# Patient Record
Sex: FEMALE | Race: WHITE | Employment: FULL TIME | URBAN - METROPOLITAN AREA
[De-identification: names, ages, dates, MRNs, and addresses within clinical notes are randomized per-mention and may not be internally consistent; named-entity substitution may affect disease eponyms.]

---

## 2017-02-02 ENCOUNTER — TELEPHONE (OUTPATIENT)
Dept: ENDOCRINOLOGY CLINIC | Facility: CLINIC | Age: 52
End: 2017-02-02

## 2017-02-02 NOTE — TELEPHONE ENCOUNTER
Future Appointments  Date Time Provider Minerva Danika   2/15/2017 3:20 PM Charlie White MD [facility]   2/22/2017 4:15 PM Yani German NP [facility]

## 2017-02-04 ENCOUNTER — APPOINTMENT (OUTPATIENT)
Dept: LAB | Facility: HOSPITAL | Age: 52
End: 2017-02-04
Attending: INTERNAL MEDICINE
Payer: COMMERCIAL

## 2017-02-04 DIAGNOSIS — E78.5 HYPERLIPIDEMIA LDL GOAL <100: ICD-10-CM

## 2017-02-04 DIAGNOSIS — E11.40 TYPE 2 DIABETES, UNCONTROLLED, WITH NEUROPATHY (HCC): ICD-10-CM

## 2017-02-04 DIAGNOSIS — E03.9 HYPOTHYROIDISM (ACQUIRED): ICD-10-CM

## 2017-02-04 DIAGNOSIS — I10 HYPERTENSION, ESSENTIAL, BENIGN: ICD-10-CM

## 2017-02-04 DIAGNOSIS — E66.01 MORBID OBESITY WITH BMI OF 40.0-44.9, ADULT (HCC): ICD-10-CM

## 2017-02-04 DIAGNOSIS — E11.65 TYPE 2 DIABETES, UNCONTROLLED, WITH NEUROPATHY (HCC): ICD-10-CM

## 2017-02-04 LAB
ALBUMIN SERPL-MCNC: 3.4 G/DL (ref 3.5–4.8)
ALP LIVER SERPL-CCNC: 98 U/L (ref 39–100)
ALT SERPL-CCNC: 27 U/L (ref 14–54)
AST SERPL-CCNC: 12 U/L (ref 15–41)
BILIRUB SERPL-MCNC: 0.8 MG/DL (ref 0.1–2)
BUN BLD-MCNC: 16 MG/DL (ref 8–20)
CALCIUM BLD-MCNC: 8.4 MG/DL (ref 8.3–10.3)
CHLORIDE: 103 MMOL/L (ref 101–111)
CHOLEST SMN-MCNC: 159 MG/DL (ref ?–200)
CO2: 29 MMOL/L (ref 22–32)
CREAT BLD-MCNC: 0.75 MG/DL (ref 0.55–1.02)
CREAT UR-SCNC: 279 MG/DL
GLUCOSE BLD-MCNC: 181 MG/DL (ref 70–99)
HDLC SERPL-MCNC: 43 MG/DL (ref 45–?)
HDLC SERPL: 3.7 {RATIO} (ref ?–4.44)
LDLC SERPL CALC-MCNC: 85 MG/DL (ref ?–130)
M PROTEIN MFR SERPL ELPH: 7.2 G/DL (ref 6.1–8.3)
MICROALBUMIN UR-MCNC: 3.33 MG/DL
MICROALBUMIN/CREAT 24H UR-RTO: 11.9 UG/MG (ref ?–30)
NONHDLC SERPL-MCNC: 116 MG/DL (ref ?–130)
POTASSIUM SERPL-SCNC: 3.7 MMOL/L (ref 3.6–5.1)
SODIUM SERPL-SCNC: 138 MMOL/L (ref 136–144)
TRIGLYCERIDES: 154 MG/DL (ref ?–150)
TSI SER-ACNC: 4.96 MIU/ML (ref 0.35–5.5)
VLDL: 31 MG/DL (ref 5–40)

## 2017-02-04 PROCEDURE — 82043 UR ALBUMIN QUANTITATIVE: CPT

## 2017-02-04 PROCEDURE — 80061 LIPID PANEL: CPT

## 2017-02-04 PROCEDURE — 84443 ASSAY THYROID STIM HORMONE: CPT

## 2017-02-04 PROCEDURE — 36415 COLL VENOUS BLD VENIPUNCTURE: CPT

## 2017-02-04 PROCEDURE — 80053 COMPREHEN METABOLIC PANEL: CPT

## 2017-02-04 PROCEDURE — 82570 ASSAY OF URINE CREATININE: CPT

## 2017-02-13 NOTE — PROGRESS NOTES
Quick Note:    Please see the 2/8/2017 MyChart patient email note for details regarding these results.         ______

## 2017-02-22 ENCOUNTER — OFFICE VISIT (OUTPATIENT)
Dept: ENDOCRINOLOGY CLINIC | Facility: CLINIC | Age: 52
End: 2017-02-22

## 2017-02-22 VITALS
WEIGHT: 245 LBS | DIASTOLIC BLOOD PRESSURE: 68 MMHG | RESPIRATION RATE: 18 BRPM | BODY MASS INDEX: 39.37 KG/M2 | SYSTOLIC BLOOD PRESSURE: 106 MMHG | TEMPERATURE: 98 F | HEIGHT: 66 IN | HEART RATE: 68 BPM

## 2017-02-22 DIAGNOSIS — E11.65 TYPE 2 DIABETES, UNCONTROLLED, WITH NEUROPATHY (HCC): Primary | ICD-10-CM

## 2017-02-22 DIAGNOSIS — E11.40 TYPE 2 DIABETES, UNCONTROLLED, WITH NEUROPATHY (HCC): Primary | ICD-10-CM

## 2017-02-22 DIAGNOSIS — Z23 NEED FOR STREPTOCOCCUS PNEUMONIAE VACCINATION: ICD-10-CM

## 2017-02-22 PROCEDURE — 90471 IMMUNIZATION ADMIN: CPT | Performed by: NURSE PRACTITIONER

## 2017-02-22 PROCEDURE — 99213 OFFICE O/P EST LOW 20 MIN: CPT | Performed by: NURSE PRACTITIONER

## 2017-02-22 PROCEDURE — 90732 PPSV23 VACC 2 YRS+ SUBQ/IM: CPT | Performed by: NURSE PRACTITIONER

## 2017-02-22 RX ORDER — LANCETS 33 GAUGE
1 EACH MISCELLANEOUS 4 TIMES DAILY
Qty: 1 BOX | Refills: 3 | Status: SHIPPED | OUTPATIENT
Start: 2017-02-22 | End: 2018-02-22

## 2017-02-22 NOTE — PATIENT INSTRUCTIONS
Continue jardiance 25 mg daily drink a lot of water   Have lab in next week (to 3 weeks)  Continue testing sugar before meals and at bedtime   Try protein after awakening to see if helps 2 hour post readings   Continue januvia and amaryl before breakfast a

## 2017-02-23 NOTE — PROGRESS NOTES
CC: Patient presents with:  Diabetes: new pt referred by PCP. pt does have meter.       HISTORY:  Past Medical History   Diagnosis Date   • Essential hypertension, benign 2/3/2014   • Anxiety state, unspecified    • Type II or unspecified type diabetes mackenzie denies     Hypertension:  Blood Pressure: At goal    Medication: lisinopril/hctz  SE none     Hyperlipidemia:  LDL:  85      Medication: none     ROS:   Constitutional: Negative for fever, chills and fatigue. No distress .   Eyes: Negative for visual distu tablet (0.5 mg total) by mouth nightly as needed for Sleep., Disp: 30 tablet, Rfl: 0  •  SITagliptin Phosphate (JANUVIA) 100 MG Oral Tab, TAKE 1 TABLET BY MOUTH DAILY WITH BREAKFAST, Disp: 90 tablet, Rfl: 1  •  Glucose Blood (ONETOUCH VERIO) In Vitro Strip OT Verio Flex is now streaming. Encouraged diabetes ed refresher. Need for streptococcus pneumoniae vaccination today   Educated on: meter use, meds action, se and timing.  Foot care       Orders Placed This Encounter  Pneumococcal 23, Adult (Pneumovax) (

## 2017-03-18 ENCOUNTER — APPOINTMENT (OUTPATIENT)
Dept: LAB | Facility: HOSPITAL | Age: 52
End: 2017-03-18
Attending: INTERNAL MEDICINE
Payer: COMMERCIAL

## 2017-03-18 DIAGNOSIS — E11.40 TYPE 2 DIABETES, UNCONTROLLED, WITH NEUROPATHY (HCC): ICD-10-CM

## 2017-03-18 DIAGNOSIS — E11.65 TYPE 2 DIABETES, UNCONTROLLED, WITH NEUROPATHY (HCC): ICD-10-CM

## 2017-03-18 DIAGNOSIS — I10 HYPERTENSION, ESSENTIAL, BENIGN: ICD-10-CM

## 2017-03-18 LAB
BUN BLD-MCNC: 19 MG/DL (ref 8–20)
CALCIUM BLD-MCNC: 9.1 MG/DL (ref 8.3–10.3)
CHLORIDE: 107 MMOL/L (ref 101–111)
CO2: 26 MMOL/L (ref 22–32)
CREAT BLD-MCNC: 0.72 MG/DL (ref 0.55–1.02)
GLUCOSE BLD-MCNC: 142 MG/DL (ref 70–99)
POTASSIUM SERPL-SCNC: 3.6 MMOL/L (ref 3.6–5.1)
SODIUM SERPL-SCNC: 141 MMOL/L (ref 136–144)

## 2017-03-18 PROCEDURE — 80048 BASIC METABOLIC PNL TOTAL CA: CPT

## 2017-03-18 PROCEDURE — 36415 COLL VENOUS BLD VENIPUNCTURE: CPT

## 2017-03-20 ENCOUNTER — PATIENT MESSAGE (OUTPATIENT)
Dept: ENDOCRINOLOGY CLINIC | Facility: CLINIC | Age: 52
End: 2017-03-20

## 2017-03-20 NOTE — TELEPHONE ENCOUNTER
Misael Lucero RN 3/20/2017 11:11 AM CDT        ----- Message -----   From: Veronica Allison   Sent: 3/20/2017 10:24 AM   To: [facility] Clinical Staff  Subject: Test Results Question     Irving Samuels, I went and did my labs for starting the Jardiance.  I know

## 2017-03-20 NOTE — PROGRESS NOTES
Quick Note:    Please see the 3/19/2017 MyChart patient email note for details regarding these results.       ______

## 2017-03-21 DIAGNOSIS — E11.65 TYPE 2 DIABETES, UNCONTROLLED, WITH NEUROPATHY (HCC): Primary | ICD-10-CM

## 2017-03-21 DIAGNOSIS — E11.40 TYPE 2 DIABETES, UNCONTROLLED, WITH NEUROPATHY (HCC): Primary | ICD-10-CM

## 2017-03-21 RX ORDER — EMPAGLIFLOZIN 25 MG/1
TABLET, FILM COATED ORAL
Qty: 30 TABLET | Refills: 2 | Status: SHIPPED | OUTPATIENT
Start: 2017-03-21 | End: 2017-04-11

## 2017-04-10 DIAGNOSIS — E11.65 TYPE 2 DIABETES, UNCONTROLLED, WITH NEUROPATHY (HCC): ICD-10-CM

## 2017-04-10 DIAGNOSIS — E11.40 TYPE 2 DIABETES, UNCONTROLLED, WITH NEUROPATHY (HCC): ICD-10-CM

## 2017-04-11 RX ORDER — ALPRAZOLAM 0.5 MG/1
0.5 TABLET ORAL NIGHTLY PRN
Qty: 30 TABLET | Refills: 0 | Status: SHIPPED | OUTPATIENT
Start: 2017-04-11 | End: 2017-04-11 | Stop reason: CLARIF

## 2017-04-11 RX ORDER — ALPRAZOLAM 0.5 MG/1
0.5 TABLET ORAL NIGHTLY PRN
Qty: 90 TABLET | Refills: 0 | Status: SHIPPED | OUTPATIENT
Start: 2017-04-11 | End: 2017-07-07

## 2017-04-11 NOTE — TELEPHONE ENCOUNTER
From: Leti Cowart  To:  Brenda Rahman NP  Sent: 4/11/2017 8:33 AM CDT  Subject: Medication Renewal Request    Original authorizing provider: ADAM Albrecht Ma would like a refill of the following medications:  alprazolam Foster Xiomara) 0

## 2017-04-11 NOTE — TELEPHONE ENCOUNTER
From: Thomas Pugh  To:  Mahendra Sommer NP  Sent: 4/10/2017 5:53 PM CDT  Subject: Medication Renewal Request    Original authorizing provider: ADAM Burrell would like a refill of the following medications:  JARDIANCE 25 MG Or

## 2017-04-11 NOTE — TELEPHONE ENCOUNTER
Faxed Rx request for 90-day supply (Alprazolam 0.5mg) to [facility] (Fax #[phone]).

## 2017-05-02 ENCOUNTER — NURSE ONLY (OUTPATIENT)
Dept: ENDOCRINOLOGY CLINIC | Facility: CLINIC | Age: 52
End: 2017-05-02

## 2017-05-02 DIAGNOSIS — E11.65 TYPE 2 DIABETES, UNCONTROLLED, WITH NEUROPATHY (HCC): Primary | ICD-10-CM

## 2017-05-02 DIAGNOSIS — E11.40 TYPE 2 DIABETES, UNCONTROLLED, WITH NEUROPATHY (HCC): Primary | ICD-10-CM

## 2017-05-02 PROCEDURE — G0109 DIAB MANAGE TRN IND/GROUP: HCPCS | Performed by: DIETITIAN, REGISTERED

## 2017-05-02 NOTE — PROGRESS NOTES
Attended Advanced Diabetes Class Part 1: eating out and exercise. Slides reviewed, written material provided. She missed Step 3 class, states she will call tomorrow to make appointment when she has her calendar available.     Debbie Tavares MS, RD, CDE, L

## 2017-06-02 ENCOUNTER — OFFICE VISIT (OUTPATIENT)
Dept: INTERNAL MEDICINE CLINIC | Facility: CLINIC | Age: 52
End: 2017-06-02

## 2017-06-02 VITALS
DIASTOLIC BLOOD PRESSURE: 60 MMHG | RESPIRATION RATE: 16 BRPM | SYSTOLIC BLOOD PRESSURE: 132 MMHG | OXYGEN SATURATION: 99 % | HEART RATE: 80 BPM | TEMPERATURE: 98 F | WEIGHT: 244.75 LBS | BODY MASS INDEX: 40.29 KG/M2 | HEIGHT: 65.5 IN

## 2017-06-02 DIAGNOSIS — L08.9 PUSTULE: ICD-10-CM

## 2017-06-02 DIAGNOSIS — G44.209 TENSION HEADACHE: Primary | ICD-10-CM

## 2017-06-02 PROCEDURE — 99213 OFFICE O/P EST LOW 20 MIN: CPT | Performed by: FAMILY MEDICINE

## 2017-06-02 RX ORDER — CEFADROXIL 500 MG/1
500 CAPSULE ORAL 2 TIMES DAILY
Qty: 14 CAPSULE | Refills: 0 | Status: SHIPPED | OUTPATIENT
Start: 2017-06-02 | End: 2017-06-15 | Stop reason: ALTCHOICE

## 2017-06-02 NOTE — PROGRESS NOTES
HPI:    Patient ID: Charlie Lehman is a 54year old female.     HPI  Seen in [address] [place] [address] for sinusitis 2 weeks ago     Treated w biaxin    Is better   No fevers  No otalgia but was sore    No fevers     No cough  No PND  Does not have a cold    On tylenol prn normal. Pupils are equal, round, and reactive to light. Lymphadenopathy:     She has no cervical adenopathy. Neurological: She has normal reflexes. No cranial nerve deficit.    Skin:   Pustule in R pubic area w 2x3 area fullness  Underneath   Is tender

## 2017-06-12 ENCOUNTER — PATIENT MESSAGE (OUTPATIENT)
Dept: ENDOCRINOLOGY CLINIC | Facility: CLINIC | Age: 52
End: 2017-06-12

## 2017-06-12 DIAGNOSIS — E11.65 TYPE 2 DIABETES, UNCONTROLLED, WITH NEUROPATHY (HCC): Primary | ICD-10-CM

## 2017-06-12 DIAGNOSIS — E11.40 TYPE 2 DIABETES, UNCONTROLLED, WITH NEUROPATHY (HCC): Primary | ICD-10-CM

## 2017-06-12 NOTE — TELEPHONE ENCOUNTER
From: Xavier High  To: Armaan De Los Santos NP  Sent: 6/12/2017 9:42 AM CDT  Subject: Non-Urgent Medical Question    Good Morning Abril. I can not remember when you said to come and see you again.  Also, I just got out of the hospital on Friday and when I

## 2017-06-12 NOTE — TELEPHONE ENCOUNTER
OT Yajaira at  please ask to do labs and schedule f/u visit while she is here, doesn't have to be fasting   Thanks  Abril BRVAO,CDE

## 2017-06-13 ENCOUNTER — APPOINTMENT (OUTPATIENT)
Dept: LAB | Facility: HOSPITAL | Age: 52
End: 2017-06-13
Attending: NURSE PRACTITIONER
Payer: COMMERCIAL

## 2017-06-13 DIAGNOSIS — E11.65 TYPE 2 DIABETES, UNCONTROLLED, WITH NEUROPATHY (HCC): ICD-10-CM

## 2017-06-13 DIAGNOSIS — E11.40 TYPE 2 DIABETES, UNCONTROLLED, WITH NEUROPATHY (HCC): ICD-10-CM

## 2017-06-13 PROCEDURE — 80048 BASIC METABOLIC PNL TOTAL CA: CPT

## 2017-06-13 PROCEDURE — 36415 COLL VENOUS BLD VENIPUNCTURE: CPT

## 2017-06-13 PROCEDURE — 83036 HEMOGLOBIN GLYCOSYLATED A1C: CPT

## 2017-06-14 ENCOUNTER — PATIENT MESSAGE (OUTPATIENT)
Dept: ENDOCRINOLOGY CLINIC | Facility: CLINIC | Age: 52
End: 2017-06-14

## 2017-06-14 NOTE — TELEPHONE ENCOUNTER
From: Remington Lam  To:  Shannan Dunbar NP  Sent: 6/14/2017 8:54 AM CDT  Subject: Non-Urgent Medical Question    Good Morning, so I see my A1C went from 9.9 to 8.6, in the right direction but not good enough, I know what I have to do, I struggle with

## 2017-06-15 ENCOUNTER — OFFICE VISIT (OUTPATIENT)
Dept: INTERNAL MEDICINE CLINIC | Facility: CLINIC | Age: 52
End: 2017-06-15

## 2017-06-15 VITALS
DIASTOLIC BLOOD PRESSURE: 78 MMHG | TEMPERATURE: 99 F | BODY MASS INDEX: 40.98 KG/M2 | SYSTOLIC BLOOD PRESSURE: 112 MMHG | HEART RATE: 85 BPM | OXYGEN SATURATION: 99 % | HEIGHT: 65 IN | WEIGHT: 246 LBS

## 2017-06-15 DIAGNOSIS — I47.1 SVT (SUPRAVENTRICULAR TACHYCARDIA) (HCC): Primary | ICD-10-CM

## 2017-06-15 PROCEDURE — 99213 OFFICE O/P EST LOW 20 MIN: CPT | Performed by: FAMILY MEDICINE

## 2017-06-15 NOTE — PROGRESS NOTES
HPI:    Patient ID: Remington Lam is a 54year old female.     HPI  8d ago was having CP and bilateral arm pain  While leavingwork      Breathing seemed ok but tightness in the neck area   Ambulance called and sent t [facility]    HR was 222   Got IV pt her s/s and DC papaerwork    Sounds like she had SVT   Will refer to Dr Eustaquio Goldberg for now    Noted for work until the 20th     Get old records  No orders of the defined types were placed in this encounter.        Meds This Visit:  No prescription

## 2017-06-26 ENCOUNTER — MYAURORA ACCOUNT LINK (OUTPATIENT)
Dept: OTHER | Age: 52
End: 2017-06-26

## 2017-06-26 ENCOUNTER — PRIOR ORIGINAL RECORDS (OUTPATIENT)
Dept: OTHER | Age: 52
End: 2017-06-26

## 2017-06-26 ENCOUNTER — OFFICE VISIT (OUTPATIENT)
Dept: ENDOCRINOLOGY CLINIC | Facility: CLINIC | Age: 52
End: 2017-06-26

## 2017-06-26 VITALS
RESPIRATION RATE: 18 BRPM | WEIGHT: 244 LBS | DIASTOLIC BLOOD PRESSURE: 66 MMHG | TEMPERATURE: 99 F | HEART RATE: 80 BPM | HEIGHT: 65 IN | BODY MASS INDEX: 40.65 KG/M2 | SYSTOLIC BLOOD PRESSURE: 98 MMHG

## 2017-06-26 DIAGNOSIS — F41.9 ANXIETY: ICD-10-CM

## 2017-06-26 DIAGNOSIS — E11.40 TYPE 2 DIABETES, UNCONTROLLED, WITH NEUROPATHY (HCC): Primary | ICD-10-CM

## 2017-06-26 DIAGNOSIS — E11.65 TYPE 2 DIABETES, UNCONTROLLED, WITH NEUROPATHY (HCC): Primary | ICD-10-CM

## 2017-06-26 PROCEDURE — 99214 OFFICE O/P EST MOD 30 MIN: CPT | Performed by: NURSE PRACTITIONER

## 2017-06-26 RX ORDER — GLIPIZIDE 5 MG/1
5 TABLET, FILM COATED, EXTENDED RELEASE ORAL DAILY
Qty: 90 TABLET | Refills: 1 | Status: SHIPPED | OUTPATIENT
Start: 2017-06-26 | End: 2017-08-04

## 2017-06-26 NOTE — PROGRESS NOTES
CC: Patient presents with:  Diabetes: follow up. pt did bring meter.       HISTORY:  Past Medical History:   Diagnosis Date   • Anxiety state, unspecified    • Essential hypertension, benign 2/3/2014   • Hypothyroidism     Dx at age 38   • Type II or unspec denies     Hypertension:  Blood Pressure: At goal    Medication: lisinopril/hctz  SE none     Hyperlipidemia:  LDL:  85      Medication: none     ROS:   Constitutional: Negative for fever, chills and fatigue. No distress .   Eyes: Negative for visual distu mouth nightly as needed for Sleep., Disp: 90 tablet, Rfl: 0  •  ONETOUCH DELICA LANCETS 59Z Does not apply Misc, 1 lancet by Finger stick route 4 (four) times daily. , Disp: 1 Box, Rfl: 3  •  Levothyroxine Sodium 137 MCG Oral Tab, Take 137 mcg by mouth ever Prescriptions Disp Refills    GlipiZIDE ER 5 MG Oral Tablet 24 Hr 90 tablet 1      Sig: Take 1 tablet (5 mg total) by mouth daily. Sertraline HCl 50 MG Oral Tab 90 tablet 1      Sig: Take 1 tablet (50 mg total) by mouth daily.            Imaging & Cons

## 2017-06-26 NOTE — PATIENT INSTRUCTIONS
Plan stop glimepiride  Continue jardiance and januvia   Start glipizide 5 mg 1 tab every morning before breakfast   continue testing glucose and return in 4-6 weeks with readings     Anxiety start zoloft 50 mg daily call if symptoms worsen

## 2017-07-07 ENCOUNTER — TELEPHONE (OUTPATIENT)
Dept: INTERNAL MEDICINE CLINIC | Facility: CLINIC | Age: 52
End: 2017-07-07

## 2017-07-07 RX ORDER — ALPRAZOLAM 0.5 MG/1
0.5 TABLET ORAL NIGHTLY PRN
Qty: 90 TABLET | Refills: 0 | COMMUNITY
Start: 2017-07-07 | End: 2018-05-13

## 2017-07-13 ENCOUNTER — PATIENT MESSAGE (OUTPATIENT)
Dept: ENDOCRINOLOGY CLINIC | Facility: CLINIC | Age: 52
End: 2017-07-13

## 2017-07-13 DIAGNOSIS — E11.40 TYPE 2 DIABETES, UNCONTROLLED, WITH NEUROPATHY (HCC): Primary | ICD-10-CM

## 2017-07-13 DIAGNOSIS — I10 HYPERTENSION, ESSENTIAL, BENIGN: ICD-10-CM

## 2017-07-13 DIAGNOSIS — E11.65 TYPE 2 DIABETES, UNCONTROLLED, WITH NEUROPATHY (HCC): Primary | ICD-10-CM

## 2017-07-13 DIAGNOSIS — E03.9 HYPOTHYROIDISM (ACQUIRED): ICD-10-CM

## 2017-07-14 RX ORDER — LISINOPRIL AND HYDROCHLOROTHIAZIDE 20; 12.5 MG/1; MG/1
1 TABLET ORAL DAILY
Qty: 90 TABLET | Refills: 0 | Status: SHIPPED | OUTPATIENT
Start: 2017-07-14 | End: 2017-12-05

## 2017-07-14 RX ORDER — LEVOTHYROXINE SODIUM 137 UG/1
137 TABLET ORAL
Qty: 90 TABLET | Refills: 0 | Status: SHIPPED | OUTPATIENT
Start: 2017-07-14 | End: 2017-10-25

## 2017-07-14 NOTE — TELEPHONE ENCOUNTER
Kale Harris, RN 7/13/2017 11:22 AM CDT        ----- Message -----  From: Benito Marking  Sent: 7/13/2017 6:30 AM  To: [facility] Clinical Staff  Subject: Prescription Question     Good Morning Abril, Could you please renew thee prescriptions.  Dr. Vibha Cage wi

## 2017-07-15 ENCOUNTER — HOSPITAL ENCOUNTER (OUTPATIENT)
Dept: MAMMOGRAPHY | Age: 52
Discharge: HOME | End: 2017-07-15
Attending: FAMILY MEDICINE
Payer: COMMERCIAL

## 2017-07-15 DIAGNOSIS — Z12.31 SCREENING MAMMOGRAM, ENCOUNTER FOR: ICD-10-CM

## 2017-07-15 PROCEDURE — 77067 SCR MAMMO BI INCL CAD: CPT | Performed by: FAMILY MEDICINE

## 2017-08-04 DIAGNOSIS — E11.40 TYPE 2 DIABETES, UNCONTROLLED, WITH NEUROPATHY (HCC): ICD-10-CM

## 2017-08-04 DIAGNOSIS — E11.65 TYPE 2 DIABETES, UNCONTROLLED, WITH NEUROPATHY (HCC): ICD-10-CM

## 2017-08-04 RX ORDER — GLIPIZIDE 5 MG/1
5 TABLET, FILM COATED, EXTENDED RELEASE ORAL
Qty: 180 TABLET | Refills: 1 | Status: SHIPPED | OUTPATIENT
Start: 2017-08-04 | End: 2017-10-22

## 2017-08-04 NOTE — TELEPHONE ENCOUNTER
Called pt left a VM to call back to let us know what  Pharmacy she would like us to send the glipizide.

## 2017-10-22 ENCOUNTER — TELEPHONE (OUTPATIENT)
Dept: INTERNAL MEDICINE CLINIC | Facility: CLINIC | Age: 52
End: 2017-10-22

## 2017-10-22 DIAGNOSIS — E11.40 TYPE 2 DIABETES, UNCONTROLLED, WITH NEUROPATHY (HCC): ICD-10-CM

## 2017-10-22 DIAGNOSIS — E11.65 TYPE 2 DIABETES, UNCONTROLLED, WITH NEUROPATHY (HCC): ICD-10-CM

## 2017-10-23 RX ORDER — GLIPIZIDE 5 MG/1
5 TABLET, FILM COATED, EXTENDED RELEASE ORAL
Qty: 60 TABLET | Refills: 1 | Status: SHIPPED | OUTPATIENT
Start: 2017-10-23 | End: 2017-12-18

## 2017-10-23 NOTE — TELEPHONE ENCOUNTER
From: Kong Lewis  Sent: 10/22/2017 6:35 PM CDT  Subject: Medication Renewal Request    Mela Watt would like a refill of the following medications:     GlipiZIDE ER 5 MG Oral Tablet 24 Hr Cheko Schneider NP]    Preferred pharmacy: [place] [id number]

## 2017-10-25 DIAGNOSIS — E03.9 HYPOTHYROIDISM (ACQUIRED): ICD-10-CM

## 2017-10-25 RX ORDER — LEVOTHYROXINE SODIUM 137 UG/1
TABLET ORAL
Qty: 90 TABLET | Refills: 0 | Status: SHIPPED | OUTPATIENT
Start: 2017-10-25 | End: 2017-11-22

## 2017-11-22 DIAGNOSIS — E03.9 HYPOTHYROIDISM (ACQUIRED): ICD-10-CM

## 2017-11-22 DIAGNOSIS — F41.9 ANXIETY: ICD-10-CM

## 2017-11-22 NOTE — TELEPHONE ENCOUNTER
Medication(s) to Refill:   Pending Prescriptions Disp Refills    SERTRALINE HCL 50 MG Oral Tab [Pharmacy Med Name: SERTRALINE HCL 50 MG TABLET] 90 tablet 1     Sig: TAKE 1 TABLET (50 MG TOTAL) BY MOUTH DAILY.            Last Time Medication was Filled:  06/

## 2017-11-26 ENCOUNTER — PATIENT MESSAGE (OUTPATIENT)
Dept: ENDOCRINOLOGY CLINIC | Facility: CLINIC | Age: 52
End: 2017-11-26

## 2017-11-27 ENCOUNTER — TELEPHONE (OUTPATIENT)
Dept: ENDOCRINOLOGY CLINIC | Facility: CLINIC | Age: 52
End: 2017-11-27

## 2017-11-27 ENCOUNTER — PATIENT MESSAGE (OUTPATIENT)
Dept: ENDOCRINOLOGY CLINIC | Facility: CLINIC | Age: 52
End: 2017-11-27

## 2017-11-27 DIAGNOSIS — E11.40 TYPE 2 DIABETES, UNCONTROLLED, WITH NEUROPATHY (HCC): Primary | ICD-10-CM

## 2017-11-27 DIAGNOSIS — E11.65 TYPE 2 DIABETES, UNCONTROLLED, WITH NEUROPATHY (HCC): Primary | ICD-10-CM

## 2017-11-27 DIAGNOSIS — F41.9 ANXIETY: ICD-10-CM

## 2017-11-27 RX ORDER — LEVOTHYROXINE SODIUM 137 UG/1
TABLET ORAL
Qty: 30 TABLET | Refills: 1 | Status: SHIPPED | OUTPATIENT
Start: 2017-11-27 | End: 2018-03-12

## 2017-11-27 NOTE — TELEPHONE ENCOUNTER
From: Derick Tineo  To:  Roxana Branch NP  Sent: 11/27/2017 1:17 PM CST  Subject: Other    Not sure if my other message went through I need to know how much to see you and would you take payments as I don't have insurance yet

## 2017-11-27 NOTE — TELEPHONE ENCOUNTER
From: Lianet Castillo  Sent: 11/27/2017 10:11 AM CST  Subject: Medication Renewal Request    Mandeep Razo.  Alysa would like a refill of the following medications:     Sertraline HCl 50 MG Oral Tab Corey Mcarthur NP]    Preferred pharmacy: [place] [address] [place]

## 2017-11-27 NOTE — TELEPHONE ENCOUNTER
From: Derick Tineo  To: Roxana Branch NP  Sent: 11/26/2017 2:16 PM CST  Subject: Prescription Question    Hi Abril, hope you had a nice thanksgiving.  I just sent in the refill for my Jaradiance, and oh boy, it's almost $500 for 30 days, just wanted

## 2017-11-27 NOTE — TELEPHONE ENCOUNTER
Pt hasn't been in since June please call to schedule asap thanks and if she can labs prior to visit   Jocelyne Harper

## 2017-11-29 NOTE — TELEPHONE ENCOUNTER
Pt does not have insurance now-lost her job-she said she sent SC a adelat msg that she might have to go on a payment plan if she comes in to be seen-not sure what she can do

## 2017-12-05 DIAGNOSIS — I10 HYPERTENSION, ESSENTIAL, BENIGN: ICD-10-CM

## 2017-12-05 RX ORDER — LISINOPRIL AND HYDROCHLOROTHIAZIDE 20; 12.5 MG/1; MG/1
1 TABLET ORAL DAILY
Qty: 90 TABLET | Refills: 0 | Status: SHIPPED | OUTPATIENT
Start: 2017-12-05 | End: 2018-03-17

## 2017-12-18 DIAGNOSIS — E11.65 TYPE 2 DIABETES, UNCONTROLLED, WITH NEUROPATHY (HCC): ICD-10-CM

## 2017-12-18 DIAGNOSIS — E11.40 TYPE 2 DIABETES, UNCONTROLLED, WITH NEUROPATHY (HCC): ICD-10-CM

## 2017-12-18 DIAGNOSIS — F41.9 ANXIETY: ICD-10-CM

## 2017-12-19 RX ORDER — GLIPIZIDE 5 MG/1
5 TABLET, FILM COATED, EXTENDED RELEASE ORAL
Qty: 60 TABLET | Refills: 1 | Status: SHIPPED | OUTPATIENT
Start: 2017-12-19 | End: 2018-01-13

## 2018-01-02 NOTE — TELEPHONE ENCOUNTER
Pt still doesn't have insurance did agree to an appt as long as SC will work something out with her.  Future Appointments  Date Time Provider Minerva Danika   1/13/2018 11:30 AM Marcelle Hernandez NP [facility]     FYI to SC no call back to akilah

## 2018-01-10 ENCOUNTER — TELEPHONE (OUTPATIENT)
Dept: INTERNAL MEDICINE CLINIC | Facility: CLINIC | Age: 53
End: 2018-01-10

## 2018-01-13 ENCOUNTER — OFFICE VISIT (OUTPATIENT)
Dept: ENDOCRINOLOGY CLINIC | Facility: CLINIC | Age: 53
End: 2018-01-13

## 2018-01-13 VITALS
HEART RATE: 72 BPM | RESPIRATION RATE: 18 BRPM | WEIGHT: 245 LBS | HEIGHT: 66 IN | DIASTOLIC BLOOD PRESSURE: 64 MMHG | BODY MASS INDEX: 39.37 KG/M2 | TEMPERATURE: 98 F | SYSTOLIC BLOOD PRESSURE: 100 MMHG

## 2018-01-13 DIAGNOSIS — E11.40 TYPE 2 DIABETES, UNCONTROLLED, WITH NEUROPATHY (HCC): ICD-10-CM

## 2018-01-13 DIAGNOSIS — E11.65 TYPE 2 DIABETES, UNCONTROLLED, WITH NEUROPATHY (HCC): ICD-10-CM

## 2018-01-13 DIAGNOSIS — J06.9 UPPER RESPIRATORY INFECTION WITH COUGH AND CONGESTION: Primary | ICD-10-CM

## 2018-01-13 LAB
CARTRIDGE LOT#: ABNORMAL NUMERIC
HEMOGLOBIN A1C: 9.2 % (ref 4.3–5.6)

## 2018-01-13 PROCEDURE — 83036 HEMOGLOBIN GLYCOSYLATED A1C: CPT | Performed by: NURSE PRACTITIONER

## 2018-01-13 RX ORDER — GLIPIZIDE 5 MG/1
20 TABLET, FILM COATED, EXTENDED RELEASE ORAL DAILY
Qty: 120 TABLET | Refills: 2 | Status: SHIPPED | OUTPATIENT
Start: 2018-01-13 | End: 2018-12-31

## 2018-01-13 RX ORDER — BENZONATATE 100 MG/1
100 CAPSULE ORAL 3 TIMES DAILY PRN
Qty: 30 CAPSULE | Refills: 0 | Status: SHIPPED | OUTPATIENT
Start: 2018-01-13 | End: 2018-06-13

## 2018-01-13 NOTE — PROGRESS NOTES
CC: Patient presents with:  Diabetes: follow up. pt did bring meter.       HISTORY:  Past Medical History:   Diagnosis Date   • Anxiety state, unspecified    • Essential hypertension, benign 2/3/2014   • Hypothyroidism     Dx at age 38   • Type II or unspec 9.9% Uses OT Verio meter   Average Fasting glucose 220-264  mg/dl   Average post meal glucose 175-276mg/dl    Hypoglycemia frequency denies   Hypertension:  Blood Pressure:   At goal    Medication: lisinopril/hctz  SE none     Hyperlipidemia:  LDL:  85 BREAKFAST, Disp: 30 tablet, Rfl: 1  •  Glucose Blood (ONETOUCH VERIO) In Vitro Strip, Test 2 times daily, Disp: 200 strip, Rfl: 1  •  metoprolol Tartrate 25 MG Oral Tab, Take 25 mg by mouth 2 (two) times daily. , Disp: , Rfl:   •  Empagliflozin (Francis Mcfarland) jardiance 25 mg daily, januvia 100 mg daily. increase glipizide 20 mg XR daily. Info on cheaper meters given. Can  stop [facility] if needed. Send readings in 1 week may need insulin or glp1 but not covered with no insurance. F/U 3 months needs labs.    URI/co

## 2018-01-17 DIAGNOSIS — F41.9 ANXIETY: ICD-10-CM

## 2018-01-19 ENCOUNTER — PATIENT MESSAGE (OUTPATIENT)
Dept: ENDOCRINOLOGY CLINIC | Facility: CLINIC | Age: 53
End: 2018-01-19

## 2018-01-19 DIAGNOSIS — J41.1 MUCOPURULENT CHRONIC BRONCHITIS (HCC): Primary | ICD-10-CM

## 2018-01-22 RX ORDER — AZITHROMYCIN 250 MG/1
TABLET, FILM COATED ORAL
Qty: 6 TABLET | Refills: 0 | Status: SHIPPED | OUTPATIENT
Start: 2018-01-22 | End: 2018-06-13

## 2018-01-22 NOTE — TELEPHONE ENCOUNTER
From: Konrad Cummings  To:  Karin Mullen, ADAM  Sent: 1/19/2018 8:06 AM CST  Subject: Visit Follow-up Question    Good Morning Abril,     I wanted to let you know that the medicine you gave me for my cough is working, but I still have the cough, this morn

## 2018-03-12 DIAGNOSIS — E03.9 HYPOTHYROIDISM (ACQUIRED): ICD-10-CM

## 2018-03-12 RX ORDER — LEVOTHYROXINE SODIUM 137 UG/1
TABLET ORAL
Qty: 20 TABLET | Refills: 0 | Status: SHIPPED | OUTPATIENT
Start: 2018-03-12 | End: 2018-04-08

## 2018-03-17 DIAGNOSIS — I10 HYPERTENSION, ESSENTIAL, BENIGN: ICD-10-CM

## 2018-03-19 RX ORDER — LISINOPRIL AND HYDROCHLOROTHIAZIDE 20; 12.5 MG/1; MG/1
1 TABLET ORAL DAILY
Qty: 90 TABLET | Refills: 0 | Status: SHIPPED | OUTPATIENT
Start: 2018-03-19 | End: 2018-06-18

## 2018-04-04 DIAGNOSIS — E11.65 TYPE 2 DIABETES, UNCONTROLLED, WITH NEUROPATHY (HCC): ICD-10-CM

## 2018-04-04 DIAGNOSIS — E11.40 TYPE 2 DIABETES, UNCONTROLLED, WITH NEUROPATHY (HCC): ICD-10-CM

## 2018-04-04 RX ORDER — GLIPIZIDE 10 MG/1
20 TABLET, FILM COATED, EXTENDED RELEASE ORAL DAILY
Qty: 60 TABLET | Refills: 1 | Status: SHIPPED | OUTPATIENT
Start: 2018-04-04 | End: 2018-04-06

## 2018-04-04 RX ORDER — GLIPIZIDE 5 MG/1
20 TABLET, FILM COATED, EXTENDED RELEASE ORAL DAILY
Qty: 120 TABLET | Refills: 2 | OUTPATIENT
Start: 2018-04-04

## 2018-04-04 NOTE — TELEPHONE ENCOUNTER
Medication(s) to Refill:   Pending Prescriptions Disp Refills    GlipiZIDE ER 5 MG Oral Tablet 24 Hr 120 tablet 2     Sig: Take 4 tablets (20 mg total) by mouth daily.              Reason for Medication Refill being sent to Provider / Reason Protocol Failed

## 2018-04-06 RX ORDER — GLIPIZIDE 10 MG/1
20 TABLET, FILM COATED, EXTENDED RELEASE ORAL DAILY
Qty: 60 TABLET | Refills: 1 | Status: SHIPPED | OUTPATIENT
Start: 2018-04-06 | End: 2018-07-16

## 2018-04-08 ENCOUNTER — TELEPHONE (OUTPATIENT)
Dept: INTERNAL MEDICINE CLINIC | Facility: CLINIC | Age: 53
End: 2018-04-08

## 2018-04-08 DIAGNOSIS — E03.9 HYPOTHYROIDISM (ACQUIRED): ICD-10-CM

## 2018-04-08 DIAGNOSIS — E11.65 TYPE 2 DIABETES, UNCONTROLLED, WITH NEUROPATHY (HCC): ICD-10-CM

## 2018-04-08 DIAGNOSIS — E78.5 HYPERLIPIDEMIA LDL GOAL <100: Primary | ICD-10-CM

## 2018-04-08 DIAGNOSIS — E11.40 TYPE 2 DIABETES, UNCONTROLLED, WITH NEUROPATHY (HCC): ICD-10-CM

## 2018-04-09 RX ORDER — LEVOTHYROXINE SODIUM 137 UG/1
TABLET ORAL
Qty: 30 TABLET | Refills: 0 | Status: SHIPPED | OUTPATIENT
Start: 2018-04-09 | End: 2018-05-29

## 2018-04-10 NOTE — TELEPHONE ENCOUNTER
Scheduled pt for Future Appointments  Date Time Provider Minerva Garnica   5/2/2018 4:15 PM Yani German NP [facility]     Pt states that her new ins starts May 1, 2018.  She would like to [facility] on that day to have her urine a

## 2018-04-11 NOTE — TELEPHONE ENCOUNTER
Future Appointments  Date Time Provider Minerva Danika   5/2/2018 4:15 PM Fredis Schmitz NP [facility]

## 2018-04-18 DIAGNOSIS — E11.65 TYPE 2 DIABETES, UNCONTROLLED, WITH NEUROPATHY (HCC): ICD-10-CM

## 2018-04-18 DIAGNOSIS — E11.40 TYPE 2 DIABETES, UNCONTROLLED, WITH NEUROPATHY (HCC): ICD-10-CM

## 2018-04-18 RX ORDER — GLIPIZIDE 5 MG/1
20 TABLET, FILM COATED, EXTENDED RELEASE ORAL DAILY
Qty: 120 TABLET | Refills: 2 | OUTPATIENT
Start: 2018-04-18

## 2018-04-18 RX ORDER — GLIPIZIDE 10 MG/1
20 TABLET, FILM COATED, EXTENDED RELEASE ORAL DAILY
Qty: 60 TABLET | Refills: 1 | Status: SHIPPED | OUTPATIENT
Start: 2018-04-18 | End: 2018-06-13

## 2018-04-18 NOTE — TELEPHONE ENCOUNTER
Medication(s) to Refill:   Pending Prescriptions Disp Refills    GLIPIZIDE ER 5 MG Oral Tablet 24 Hr [Pharmacy Med Name: GLIPIZIDE ER 5 MG TABLET] 120 tablet 2     Sig: TAKE 4 TABLETS (20 MG TOTAL) BY MOUTH DAILY.              Reason for Medication Refill b

## 2018-05-05 ENCOUNTER — LAB ENCOUNTER (OUTPATIENT)
Dept: LAB | Facility: HOSPITAL | Age: 53
End: 2018-05-05
Attending: NURSE PRACTITIONER
Payer: COMMERCIAL

## 2018-05-05 DIAGNOSIS — E03.9 HYPOTHYROIDISM (ACQUIRED): ICD-10-CM

## 2018-05-05 DIAGNOSIS — E78.5 HYPERLIPIDEMIA LDL GOAL <100: ICD-10-CM

## 2018-05-05 DIAGNOSIS — F41.9 ANXIETY: ICD-10-CM

## 2018-05-05 DIAGNOSIS — E11.65 TYPE 2 DIABETES, UNCONTROLLED, WITH NEUROPATHY (HCC): ICD-10-CM

## 2018-05-05 DIAGNOSIS — E11.40 TYPE 2 DIABETES, UNCONTROLLED, WITH NEUROPATHY (HCC): ICD-10-CM

## 2018-05-05 PROCEDURE — 84443 ASSAY THYROID STIM HORMONE: CPT

## 2018-05-05 PROCEDURE — 80053 COMPREHEN METABOLIC PANEL: CPT

## 2018-05-05 PROCEDURE — 82043 UR ALBUMIN QUANTITATIVE: CPT

## 2018-05-05 PROCEDURE — 84439 ASSAY OF FREE THYROXINE: CPT

## 2018-05-05 PROCEDURE — 82570 ASSAY OF URINE CREATININE: CPT

## 2018-05-05 PROCEDURE — 36415 COLL VENOUS BLD VENIPUNCTURE: CPT

## 2018-05-05 PROCEDURE — 80061 LIPID PANEL: CPT

## 2018-05-05 PROCEDURE — 83036 HEMOGLOBIN GLYCOSYLATED A1C: CPT

## 2018-05-11 ENCOUNTER — PATIENT MESSAGE (OUTPATIENT)
Dept: ENDOCRINOLOGY CLINIC | Facility: CLINIC | Age: 53
End: 2018-05-11

## 2018-05-11 DIAGNOSIS — E11.65 TYPE 2 DIABETES, UNCONTROLLED, WITH NEUROPATHY (HCC): ICD-10-CM

## 2018-05-11 DIAGNOSIS — E11.40 TYPE 2 DIABETES, UNCONTROLLED, WITH NEUROPATHY (HCC): ICD-10-CM

## 2018-05-11 NOTE — TELEPHONE ENCOUNTER
From: Ruddy Frank  To: Patrice Razo NP  Sent: 5/11/2018 1:47 PM CDT  Subject: Prescription Question    Irving Butt, Could you p;ease call in or send in prescriptions for Jardiance and Januvia?  I called today to try and make an appointment with your

## 2018-05-13 DIAGNOSIS — E11.65 TYPE 2 DIABETES, UNCONTROLLED, WITH NEUROPATHY (HCC): ICD-10-CM

## 2018-05-13 DIAGNOSIS — E11.40 TYPE 2 DIABETES, UNCONTROLLED, WITH NEUROPATHY (HCC): ICD-10-CM

## 2018-05-14 RX ORDER — ALPRAZOLAM 0.5 MG/1
0.5 TABLET ORAL NIGHTLY PRN
Qty: 90 TABLET | Refills: 0
Start: 2018-05-14 | End: 2018-09-22

## 2018-05-14 NOTE — TELEPHONE ENCOUNTER
From: Renate Clark  Sent: 5/13/2018 11:15 AM CDT  Subject: Medication Renewal Request    Rose Caicedo.  Alysa would like a refill of the following medications:     Empagliflozin (JARDIANCE) 25 MG Oral Tab [Abril Guy NP]     SITagliptin Phosphate (J

## 2018-05-14 NOTE — TELEPHONE ENCOUNTER
Patient returned call and scheduled.     Future Appointments  Date Time Provider Minerva Garnica   6/13/2018 4:30 PM Patrice Razo NP [facility]

## 2018-05-14 NOTE — TELEPHONE ENCOUNTER
From: Mercy Gallagher  Sent: 5/13/2018 11:15 AM CDT  Subject: Medication Renewal Request    Dominga Stein.  Alysa would like a refill of the following medications:     ALPRAZolam Kj Bryant) 0.5 MG Oral Tab    Preferred pharmacy: [facility] [id number] [place] [facility] [place]

## 2018-05-17 ENCOUNTER — PATIENT MESSAGE (OUTPATIENT)
Dept: ENDOCRINOLOGY CLINIC | Facility: CLINIC | Age: 53
End: 2018-05-17

## 2018-05-17 DIAGNOSIS — F41.9 ANXIETY: ICD-10-CM

## 2018-05-17 NOTE — TELEPHONE ENCOUNTER
From: Divina Woodard  Sent: 5/17/2018 7:46 AM CDT  Subject: Medication Renewal Request    Matthew Arceo.  Maribelrd would like a refill of the following medications:     SERTRALINE HCL 50 MG Oral Tab Gracie Marcelino MD]    [facility] [address]

## 2018-05-17 NOTE — TELEPHONE ENCOUNTER
Contacted [facility] @[phone] for pt. ( OT#[id number]) . Coverage was approved for Jardiance 25 mg daily starting 4/17/18 to 5/17/19. Case # [id number]. Contacted [facility] pharmacy to inform them ;it was covered but cost is $200/90 day supply.  Darvin

## 2018-05-22 NOTE — TELEPHONE ENCOUNTER
Please call pharmacy and ask to run through again per below covered, if so let pt know thanks   Thanks   Jocelyne Harper

## 2018-05-25 DIAGNOSIS — F41.9 ANXIETY: ICD-10-CM

## 2018-05-29 DIAGNOSIS — E03.9 HYPOTHYROIDISM (ACQUIRED): ICD-10-CM

## 2018-05-29 RX ORDER — LEVOTHYROXINE SODIUM 137 UG/1
TABLET ORAL
Qty: 30 TABLET | Refills: 5 | Status: SHIPPED | OUTPATIENT
Start: 2018-05-29 | End: 2018-12-13

## 2018-05-29 NOTE — TELEPHONE ENCOUNTER
From: Lisa Aguilar  Sent: 5/25/2018 3:45 PM CDT  Subject: Medication Renewal Request    Chelsy Watt would like a refill of the following medications:     Sertraline HCl 50 MG Oral Tab Ottoniel Mcdowell MD]    Preferred pharmacy: [address] [facility] [address] [place] [address]. [phone], [phone]

## 2018-06-13 ENCOUNTER — OFFICE VISIT (OUTPATIENT)
Dept: ENDOCRINOLOGY CLINIC | Facility: CLINIC | Age: 53
End: 2018-06-13

## 2018-06-13 VITALS
DIASTOLIC BLOOD PRESSURE: 62 MMHG | BODY MASS INDEX: 38.09 KG/M2 | TEMPERATURE: 98 F | WEIGHT: 237 LBS | HEIGHT: 66 IN | SYSTOLIC BLOOD PRESSURE: 98 MMHG | HEART RATE: 72 BPM | RESPIRATION RATE: 18 BRPM

## 2018-06-13 DIAGNOSIS — E11.65 TYPE 2 DIABETES, UNCONTROLLED, WITH NEUROPATHY (HCC): Primary | ICD-10-CM

## 2018-06-13 DIAGNOSIS — E11.40 TYPE 2 DIABETES, UNCONTROLLED, WITH NEUROPATHY (HCC): Primary | ICD-10-CM

## 2018-06-13 PROCEDURE — 99213 OFFICE O/P EST LOW 20 MIN: CPT | Performed by: NURSE PRACTITIONER

## 2018-06-13 RX ORDER — METOPROLOL SUCCINATE 25 MG/1
25 TABLET, EXTENDED RELEASE ORAL
Refills: 3 | COMMUNITY
Start: 2018-03-16 | End: 2019-06-08

## 2018-06-13 NOTE — PROGRESS NOTES
CC: Patient presents with:  Diabetes: follow up. pt did bring meter.       HISTORY:  Past Medical History:   Diagnosis Date   • Anxiety state, unspecified    • Essential hypertension, benign 2/3/2014   • Hypothyroidism     Dx at age 38   • Type II or unspec No distress . Eyes: Negative for visual disturbance. Last eye exam 12/30/17  with [address] No retinopathy   Respiratory: Negative for cough, chest tightness, shortness of breath and wheezing.    Cardiovascular: Negative for chest pain, palpi Tartrate 25 MG Oral Tab, Take 25 mg by mouth 2 (two) times daily. , Disp: , Rfl:   •  Albuterol Sulfate HFA (PROAIR HFA) 108 (90 BASE) MCG/ACT Inhalation Aero Soln, Inhale 2 puffs into the lungs every 4 (four) hours as needed for Wheezing., Disp: 1 Inhaler, needs recheck  Tobacco: n/a   Flu vaccine: up to date   Pneumonia vaccine: up to date   Depression screen: up to date on SSRI    Check glucoses 2-3  times daily - fasting, premeals and/or bedtime.   Call/fax/email glucose logs in 8 weeks    Return in about

## 2018-06-14 DIAGNOSIS — F41.9 ANXIETY: ICD-10-CM

## 2018-06-14 NOTE — TELEPHONE ENCOUNTER
Medication(s) to Refill:   Pending Prescriptions Disp Refills    SERTRALINE HCL 50 MG Oral Tab [Pharmacy Med Name: SERTRALINE HCL 50 MG TABLET] 30 tablet 0     Sig: TAKE 1 TABLET (50 MG TOTAL) BY MOUTH DAILY. DUE FOR OFFICE VISIT.          Non protocol medication   Last Time Medication was Filled: 5/17/18 30 0R   Last Office Visit with PCP: 6/15/2017    When Patient was Due Back to the Office: n/a  (from when PCP last addressed condition)    Last Blood Pressures:  BP Readings from Last 2 Encounters:  06/13/18 : 98/62  01/13/18 : 100/64    Recent Labs:    Lab Results  Component Value Date    (H) 05/05/2018   BUN 20 05/05/2018   CREATSERUM 0.75 05/05/2018    06/13/2017   GFRNAA 93 05/05/2018   GFRAA 107 05/05/2018   CA 9.1 05/05/2018   ALKPHO 118 (H) 05/05/2018   AST 15 05/05/2018   ALT 29 05/05/2018   BILT 0.7 05/05/2018   TP 7.7 05/05/2018   ALB 3.7 05/05/2018   GLOBULT 3.0 02/04/2012   ALBGLOBRAT 1.3 02/04/2012    05/05/2018   K 3.9 05/05/2018    05/05/2018   CO2 25.0 05/05/2018

## 2018-06-18 DIAGNOSIS — I10 HYPERTENSION, ESSENTIAL, BENIGN: ICD-10-CM

## 2018-06-18 RX ORDER — LISINOPRIL AND HYDROCHLOROTHIAZIDE 20; 12.5 MG/1; MG/1
1 TABLET ORAL DAILY
Qty: 90 TABLET | Refills: 0 | Status: SHIPPED | OUTPATIENT
Start: 2018-06-18 | End: 2018-10-11

## 2018-07-16 RX ORDER — GLIPIZIDE 10 MG/1
20 TABLET, FILM COATED, EXTENDED RELEASE ORAL DAILY
Qty: 60 TABLET | Refills: 1 | Status: SHIPPED | OUTPATIENT
Start: 2018-07-16 | End: 2018-10-07

## 2018-07-18 ENCOUNTER — TELEPHONE (OUTPATIENT)
Dept: ENDOCRINOLOGY CLINIC | Facility: CLINIC | Age: 53
End: 2018-07-18

## 2018-07-18 DIAGNOSIS — E11.40 TYPE 2 DIABETES, UNCONTROLLED, WITH NEUROPATHY (HCC): ICD-10-CM

## 2018-07-18 DIAGNOSIS — E11.65 TYPE 2 DIABETES, UNCONTROLLED, WITH NEUROPATHY (HCC): ICD-10-CM

## 2018-07-18 NOTE — TELEPHONE ENCOUNTER
Last Office Visit: 6-13-18 with [facility] for diabetes  Last Rx Filled: 6-13-18 2 pen samples were given  Last Labs: 5-5-18 urine micro albumin/lipid/tsh/t4/cmp/hga1c  Future Appointment: none    Per protocol to provider

## 2018-07-23 RX ORDER — DULAGLUTIDE 0.75 MG/.5ML
0.5 INJECTION, SOLUTION SUBCUTANEOUS WEEKLY
Qty: 4 PEN | Refills: 0 | Status: SHIPPED | OUTPATIENT
Start: 2018-07-23 | End: 2018-08-30

## 2018-07-27 NOTE — TELEPHONE ENCOUNTER
Future Appointments  Date Time Provider Minerva Danika   9/26/2018 4:00 PM Janie Mace MD [facility]

## 2018-08-13 DIAGNOSIS — IMO0002 TYPE 2 DIABETES, UNCONTROLLED, WITH NEUROPATHY: ICD-10-CM

## 2018-08-13 RX ORDER — EMPAGLIFLOZIN 25 MG/1
TABLET, FILM COATED ORAL
Qty: 90 TABLET | Refills: 0 | OUTPATIENT
Start: 2018-08-13

## 2018-08-13 NOTE — TELEPHONE ENCOUNTER
LOV-6/26/17 SC  FOV-9/26/18 AS  LAST RX-5/14/18 90 tabs 0 refills  LAST LABS-5/5/18  PER PROTOCOL-to provider

## 2018-08-18 DIAGNOSIS — IMO0002 TYPE 2 DIABETES, UNCONTROLLED, WITH NEUROPATHY: ICD-10-CM

## 2018-08-20 NOTE — TELEPHONE ENCOUNTER
Last Office Visit: 6-13-18 with [facility] for diabetes  Last Rx Filled: 5-14-18 90 tabs with no refills  Last Labs: 5-5-18 urine micro albumin/lipid/tsh/t4/cmp/hga1c  Future Appointment: 9-26-18    Per protocol to provider

## 2018-08-21 RX ORDER — EMPAGLIFLOZIN 25 MG/1
TABLET, FILM COATED ORAL
Qty: 90 TABLET | Refills: 0 | OUTPATIENT
Start: 2018-08-21

## 2018-08-26 DIAGNOSIS — E11.65 TYPE 2 DIABETES, UNCONTROLLED, WITH NEUROPATHY (HCC): ICD-10-CM

## 2018-08-26 DIAGNOSIS — E11.40 TYPE 2 DIABETES, UNCONTROLLED, WITH NEUROPATHY (HCC): ICD-10-CM

## 2018-08-27 RX ORDER — EMPAGLIFLOZIN 25 MG/1
TABLET, FILM COATED ORAL
Qty: 90 TABLET | Refills: 0 | OUTPATIENT
Start: 2018-08-27

## 2018-08-27 NOTE — TELEPHONE ENCOUNTER
Protocol failed due to last HgBA1C<7.5   Please advise     LOV:6/13/18 SC  FOV:9/26/18  LAST RX: 5/14/18 25 mg take 1 tablet daily 90 tablets 0 refills   LAST LABS:5/5/18microalb,lipid,tsh,cmp,A1C  PER PROTOCOL: to provider

## 2018-08-29 NOTE — TELEPHONE ENCOUNTER
Future Appointments  Date Time Provider [facility] Danika   9/22/2018 9:00 AM Elizabeth Jasso NP [facility] [place]   9/26/2018 4:00 PM Jannis Boast, MD [facility]

## 2018-08-30 DIAGNOSIS — E11.65 TYPE 2 DIABETES, UNCONTROLLED, WITH NEUROPATHY (HCC): ICD-10-CM

## 2018-08-30 DIAGNOSIS — E11.40 TYPE 2 DIABETES, UNCONTROLLED, WITH NEUROPATHY (HCC): ICD-10-CM

## 2018-08-31 RX ORDER — DULAGLUTIDE 0.75 MG/.5ML
0.5 INJECTION, SOLUTION SUBCUTANEOUS WEEKLY
Qty: 4 PEN | Refills: 0 | Status: SHIPPED | OUTPATIENT
Start: 2018-08-31 | End: 2018-09-26

## 2018-08-31 RX ORDER — EMPAGLIFLOZIN 25 MG/1
TABLET, FILM COATED ORAL
Qty: 90 TABLET | Refills: 0 | Status: SHIPPED | OUTPATIENT
Start: 2018-08-31 | End: 2018-11-20

## 2018-08-31 NOTE — TELEPHONE ENCOUNTER
LOV: 06/13/2018 SC  Future Visit: 09/26/2018 AS  Last Labs: 05/05/2018 Micro, Lipid panel, TSH, COMP, Hemoglobin A1C  Last Rx: 07/23/2018    Per protocol sent to provider

## 2018-09-22 ENCOUNTER — OFFICE VISIT (OUTPATIENT)
Dept: INTERNAL MEDICINE CLINIC | Facility: CLINIC | Age: 53
End: 2018-09-22
Payer: COMMERCIAL

## 2018-09-22 VITALS
WEIGHT: 235.75 LBS | HEIGHT: 65.25 IN | HEART RATE: 65 BPM | OXYGEN SATURATION: 98 % | SYSTOLIC BLOOD PRESSURE: 130 MMHG | DIASTOLIC BLOOD PRESSURE: 70 MMHG | BODY MASS INDEX: 38.8 KG/M2 | TEMPERATURE: 99 F | RESPIRATION RATE: 18 BRPM

## 2018-09-22 DIAGNOSIS — Z00.00 ROUTINE GENERAL MEDICAL EXAMINATION AT A HEALTH CARE FACILITY: Primary | ICD-10-CM

## 2018-09-22 DIAGNOSIS — Z00.00 LABORATORY EXAMINATION ORDERED AS PART OF A ROUTINE GENERAL MEDICAL EXAMINATION: ICD-10-CM

## 2018-09-22 DIAGNOSIS — Z12.11 SCREEN FOR COLON CANCER: ICD-10-CM

## 2018-09-22 DIAGNOSIS — Z12.31 SCREENING MAMMOGRAM, ENCOUNTER FOR: ICD-10-CM

## 2018-09-22 DIAGNOSIS — Z12.11 ENCOUNTER FOR SCREENING FECAL OCCULT BLOOD TESTING: ICD-10-CM

## 2018-09-22 LAB
ALBUMIN SERPL-MCNC: 3.9 G/DL (ref 3.5–4.8)
ALBUMIN/GLOB SERPL: 1 {RATIO} (ref 1–2)
ALP LIVER SERPL-CCNC: 108 U/L (ref 39–100)
ALT SERPL-CCNC: 37 U/L (ref 14–54)
ANION GAP SERPL CALC-SCNC: 8 MMOL/L (ref 0–18)
AST SERPL-CCNC: 18 U/L (ref 15–41)
BASOPHILS # BLD AUTO: 0.06 X10(3) UL (ref 0–0.1)
BASOPHILS NFR BLD AUTO: 0.6 %
BILIRUB SERPL-MCNC: 0.7 MG/DL (ref 0.1–2)
BUN BLD-MCNC: 18 MG/DL (ref 8–20)
BUN/CREAT SERPL: 22.5 (ref 10–20)
CALCIUM BLD-MCNC: 8.9 MG/DL (ref 8.3–10.3)
CHLORIDE SERPL-SCNC: 106 MMOL/L (ref 101–111)
CHOLEST SMN-MCNC: 156 MG/DL (ref ?–200)
CO2 SERPL-SCNC: 26 MMOL/L (ref 22–32)
CREAT BLD-MCNC: 0.8 MG/DL (ref 0.55–1.02)
EOSINOPHIL # BLD AUTO: 0.18 X10(3) UL (ref 0–0.3)
EOSINOPHIL NFR BLD AUTO: 1.8 %
ERYTHROCYTE [DISTWIDTH] IN BLOOD BY AUTOMATED COUNT: 12 % (ref 11.5–16)
EST. AVERAGE GLUCOSE BLD GHB EST-MCNC: 192 MG/DL (ref 68–126)
GLOBULIN PLAS-MCNC: 4.1 G/DL (ref 2.5–4)
GLUCOSE BLD-MCNC: 134 MG/DL (ref 70–99)
HBA1C MFR BLD HPLC: 8.3 % (ref ?–5.7)
HCT VFR BLD AUTO: 49.2 % (ref 34–50)
HDLC SERPL-MCNC: 38 MG/DL (ref 40–59)
HGB BLD-MCNC: 16.1 G/DL (ref 12–16)
IMMATURE GRANULOCYTE COUNT: 0.02 X10(3) UL (ref 0–1)
IMMATURE GRANULOCYTE RATIO %: 0.2 %
LDLC SERPL CALC-MCNC: 89 MG/DL (ref ?–100)
LYMPHOCYTES # BLD AUTO: 2.44 X10(3) UL (ref 0.9–4)
LYMPHOCYTES NFR BLD AUTO: 24.9 %
M PROTEIN MFR SERPL ELPH: 8 G/DL (ref 6.1–8.3)
MCH RBC QN AUTO: 29.4 PG (ref 27–33.2)
MCHC RBC AUTO-ENTMCNC: 32.7 G/DL (ref 31–37)
MCV RBC AUTO: 89.9 FL (ref 81–100)
MONOCYTES # BLD AUTO: 0.34 X10(3) UL (ref 0.1–1)
MONOCYTES NFR BLD AUTO: 3.5 %
NEUTROPHIL ABS PRELIM: 6.76 X10 (3) UL (ref 1.3–6.7)
NEUTROPHILS # BLD AUTO: 6.76 X10(3) UL (ref 1.3–6.7)
NEUTROPHILS NFR BLD AUTO: 69 %
NONHDLC SERPL-MCNC: 118 MG/DL (ref ?–130)
OSMOLALITY SERPL CALC.SUM OF ELEC: 294 MOSM/KG (ref 275–295)
PLATELET # BLD AUTO: 274 10(3)UL (ref 150–450)
POTASSIUM SERPL-SCNC: 3.9 MMOL/L (ref 3.6–5.1)
RBC # BLD AUTO: 5.47 X10(6)UL (ref 3.8–5.1)
RED CELL DISTRIBUTION WIDTH-SD: 39.4 FL (ref 35.1–46.3)
SODIUM SERPL-SCNC: 140 MMOL/L (ref 136–144)
TRIGL SERPL-MCNC: 144 MG/DL (ref 30–149)
VIT D+METAB SERPL-MCNC: 18.4 NG/ML (ref 30–100)
VLDLC SERPL CALC-MCNC: 29 MG/DL (ref 0–30)
WBC # BLD AUTO: 9.8 X10(3) UL (ref 4–13)

## 2018-09-22 PROCEDURE — 85025 COMPLETE CBC W/AUTO DIFF WBC: CPT | Performed by: FAMILY MEDICINE

## 2018-09-22 PROCEDURE — 83036 HEMOGLOBIN GLYCOSYLATED A1C: CPT | Performed by: FAMILY MEDICINE

## 2018-09-22 PROCEDURE — 82306 VITAMIN D 25 HYDROXY: CPT | Performed by: FAMILY MEDICINE

## 2018-09-22 PROCEDURE — 80061 LIPID PANEL: CPT | Performed by: FAMILY MEDICINE

## 2018-09-22 PROCEDURE — 99396 PREV VISIT EST AGE 40-64: CPT | Performed by: FAMILY MEDICINE

## 2018-09-22 PROCEDURE — 80053 COMPREHEN METABOLIC PANEL: CPT | Performed by: FAMILY MEDICINE

## 2018-09-22 RX ORDER — ALPRAZOLAM 0.5 MG/1
0.5 TABLET ORAL NIGHTLY PRN
Qty: 90 TABLET | Refills: 0 | Status: SHIPPED | OUTPATIENT
Start: 2018-09-22 | End: 2019-05-21

## 2018-09-22 NOTE — PROGRESS NOTES
HPI:   Charlie Lehman is a 50year old female who presents for a complete physical exam. Symptoms: denies discharge, itching, burning or dysuria, flow is 7-8 days, menses irregular last one was around June. Patient complains: none.  Pt will be seeing the Date     05/05/2018    LDL 85 02/04/2017     12/12/2015     Lab Results   Component Value Date    AST 15 05/05/2018    AST 12 (L) 02/04/2017    AST 16 12/12/2015     Lab Results   Component Value Date    ALT 29 05/05/2018    ALT 27 02/04/2017 date: OTHER SURGICAL HISTORY      Comment:  [place] teeth extraction   Family History   Problem Relation Age of Onset   • Diabetes Mother         Type 2 DM   • Heart Disorder Mother         CHF and atrial fib   • Diabetes Father    • Diabetes Maternal Driver BS's,no masses, HSM or tenderness  :deferred  MUSCULOSKELETAL: back is not tender,FROM of the back  EXTREMITIES: no cyanosis, clubbing or edema  NEURO: Oriented times three,cranial nerves are intact,motor and sensory are grossly intact    ASSESSMENT AND

## 2018-09-24 ENCOUNTER — TELEPHONE (OUTPATIENT)
Dept: INTERNAL MEDICINE CLINIC | Facility: CLINIC | Age: 53
End: 2018-09-24

## 2018-09-24 DIAGNOSIS — R74.8 ELEVATED ALKALINE PHOSPHATASE LEVEL: Primary | ICD-10-CM

## 2018-09-24 DIAGNOSIS — E78.5 HYPERLIPIDEMIA LDL GOAL <100: ICD-10-CM

## 2018-09-24 DIAGNOSIS — E55.9 VITAMIN D DEFICIENCY: ICD-10-CM

## 2018-09-24 DIAGNOSIS — E11.40 TYPE 2 DIABETES, UNCONTROLLED, WITH NEUROPATHY (HCC): ICD-10-CM

## 2018-09-24 DIAGNOSIS — E11.65 TYPE 2 DIABETES, UNCONTROLLED, WITH NEUROPATHY (HCC): ICD-10-CM

## 2018-09-24 RX ORDER — ERGOCALCIFEROL 1.25 MG/1
50000 CAPSULE ORAL WEEKLY
Qty: 12 CAPSULE | Refills: 1 | Status: SHIPPED | OUTPATIENT
Start: 2018-09-24 | End: 2018-10-24

## 2018-09-24 RX ORDER — DULAGLUTIDE 0.75 MG/.5ML
0.5 INJECTION, SOLUTION SUBCUTANEOUS WEEKLY
Qty: 2 ML | Refills: 1 | OUTPATIENT
Start: 2018-09-24

## 2018-09-24 NOTE — TELEPHONE ENCOUNTER
Pt needs vit D 97748 IU once a week for 6 monthsw , after 2000 IU daily!!!. Recheck vit D in 6 months. Hdl up a little but still low. Pt to work on exercising more. Alk phos improving, will continue to monitor the level. Cbc ok.  Recheck CMP,lipids in 6 mon

## 2018-09-24 NOTE — TELEPHONE ENCOUNTER
Last Office Visit: 6-13-18 with [facility] for diabetes  Last Rx Filled: 8-31-18 4 pen with no refills  Last Labs: 9-22-18 cbc/vitamin D/hga1c/cmp/lipid  Future Appointment: 9-26-18    Per protocol to provider

## 2018-09-25 NOTE — TELEPHONE ENCOUNTER
Future Appointments   Date Time Provider Minerva Danika   9/26/2018  4:00 PM Maria D Palomares MD [facility]

## 2018-09-26 ENCOUNTER — OFFICE VISIT (OUTPATIENT)
Dept: ENDOCRINOLOGY CLINIC | Facility: CLINIC | Age: 53
End: 2018-09-26
Payer: COMMERCIAL

## 2018-09-26 VITALS
BODY MASS INDEX: 39.82 KG/M2 | SYSTOLIC BLOOD PRESSURE: 98 MMHG | DIASTOLIC BLOOD PRESSURE: 56 MMHG | HEIGHT: 65 IN | HEART RATE: 72 BPM | WEIGHT: 239 LBS | TEMPERATURE: 98 F

## 2018-09-26 DIAGNOSIS — E78.5 HYPERLIPIDEMIA LDL GOAL <100: Primary | ICD-10-CM

## 2018-09-26 DIAGNOSIS — E11.40 TYPE 2 DIABETES, UNCONTROLLED, WITH NEUROPATHY (HCC): ICD-10-CM

## 2018-09-26 DIAGNOSIS — E11.65 TYPE 2 DIABETES, UNCONTROLLED, WITH NEUROPATHY (HCC): ICD-10-CM

## 2018-09-26 DIAGNOSIS — E03.9 HYPOTHYROIDISM (ACQUIRED): ICD-10-CM

## 2018-09-26 DIAGNOSIS — I10 HYPERTENSION, ESSENTIAL, BENIGN: ICD-10-CM

## 2018-09-26 PROCEDURE — 99214 OFFICE O/P EST MOD 30 MIN: CPT | Performed by: INTERNAL MEDICINE

## 2018-09-26 NOTE — PROGRESS NOTES
Patient presents with:  Diabetes: LG. Room 8. Has her meter with her for readings      HPI:  Here for f/u of dm2, htn, high chol. Pt has had improvement in  A1, not yet to goal, but much mproved with addition of truliciity, no tolerability issues or se's. mouth once daily.  Disp:  Rfl: 3   LEVOTHYROXINE SODIUM 137 MCG Oral Tab TAKE 1 TABLET BY MOUTH EVERY MORNING BEFORE BREAKFAST Disp: 30 tablet Rfl: 5   Glucose Blood (ONETOUCH VERIO) In Vitro Strip Test 2 times daily Disp: 200 strip Rfl: 1   Albuterol Sulfa were answered and the patient understands the plan.

## 2018-10-08 RX ORDER — GLIPIZIDE 10 MG/1
TABLET, FILM COATED, EXTENDED RELEASE ORAL
Qty: 60 TABLET | Refills: 1 | Status: SHIPPED | OUTPATIENT
Start: 2018-10-08 | End: 2018-11-20

## 2018-10-08 NOTE — TELEPHONE ENCOUNTER
LOV-9/26/18 AS  FOV-1/9/19 AS  LAST RX-7/16/18 60 tabs 1 refill  LAST LABS-9/22/18 a1c- 8.3  PER PROTOCOL-to provider

## 2018-10-11 ENCOUNTER — TELEPHONE (OUTPATIENT)
Dept: ENDOCRINOLOGY CLINIC | Facility: CLINIC | Age: 53
End: 2018-10-11

## 2018-10-11 DIAGNOSIS — I10 HYPERTENSION, ESSENTIAL, BENIGN: ICD-10-CM

## 2018-10-11 NOTE — TELEPHONE ENCOUNTER
FBS's still not to-goal. A1C improved to 8.3% in Sept. Her next appt with you is Ginger Please advise.     10/11 6:01am 155   10/10 6:07am 137   10/9 5:50am 189   10/8 6:01am 182   10/7 8:12 139   10/6 7:16 am 167   10/5 5:52am 150

## 2018-10-12 RX ORDER — LISINOPRIL AND HYDROCHLOROTHIAZIDE 20; 12.5 MG/1; MG/1
1 TABLET ORAL DAILY
Qty: 90 TABLET | Refills: 0 | Status: SHIPPED | OUTPATIENT
Start: 2018-10-12 | End: 2019-01-06

## 2018-11-05 ENCOUNTER — TELEPHONE (OUTPATIENT)
Dept: INTERNAL MEDICINE CLINIC | Facility: CLINIC | Age: 53
End: 2018-11-05

## 2018-11-05 DIAGNOSIS — F41.9 ANXIETY: ICD-10-CM

## 2018-11-17 ENCOUNTER — LAB ENCOUNTER (OUTPATIENT)
Dept: LAB | Facility: HOSPITAL | Age: 53
End: 2018-11-17
Attending: FAMILY MEDICINE
Payer: COMMERCIAL

## 2018-11-17 DIAGNOSIS — E11.40 TYPE 2 DIABETES, UNCONTROLLED, WITH NEUROPATHY (HCC): ICD-10-CM

## 2018-11-17 DIAGNOSIS — E11.65 TYPE 2 DIABETES, UNCONTROLLED, WITH NEUROPATHY (HCC): ICD-10-CM

## 2018-11-17 PROCEDURE — 82570 ASSAY OF URINE CREATININE: CPT

## 2018-11-17 PROCEDURE — 82043 UR ALBUMIN QUANTITATIVE: CPT

## 2018-11-17 PROCEDURE — 80061 LIPID PANEL: CPT

## 2018-11-17 PROCEDURE — 36415 COLL VENOUS BLD VENIPUNCTURE: CPT

## 2018-11-17 PROCEDURE — 83036 HEMOGLOBIN GLYCOSYLATED A1C: CPT

## 2018-11-17 PROCEDURE — 80053 COMPREHEN METABOLIC PANEL: CPT

## 2018-11-20 ENCOUNTER — OFFICE VISIT (OUTPATIENT)
Dept: ENDOCRINOLOGY CLINIC | Facility: CLINIC | Age: 53
End: 2018-11-20
Payer: COMMERCIAL

## 2018-11-20 VITALS
BODY MASS INDEX: 39.67 KG/M2 | WEIGHT: 241 LBS | TEMPERATURE: 98 F | RESPIRATION RATE: 20 BRPM | HEIGHT: 65.5 IN | DIASTOLIC BLOOD PRESSURE: 60 MMHG | HEART RATE: 80 BPM | SYSTOLIC BLOOD PRESSURE: 110 MMHG

## 2018-11-20 DIAGNOSIS — E78.5 HYPERLIPIDEMIA LDL GOAL <100: Primary | ICD-10-CM

## 2018-11-20 DIAGNOSIS — E11.40 TYPE 2 DIABETES, UNCONTROLLED, WITH NEUROPATHY (HCC): ICD-10-CM

## 2018-11-20 DIAGNOSIS — I10 HYPERTENSION, ESSENTIAL, BENIGN: ICD-10-CM

## 2018-11-20 DIAGNOSIS — E11.65 TYPE 2 DIABETES, UNCONTROLLED, WITH NEUROPATHY (HCC): ICD-10-CM

## 2018-11-20 DIAGNOSIS — E66.01 MORBID OBESITY WITH BMI OF 40.0-44.9, ADULT (HCC): ICD-10-CM

## 2018-11-20 PROCEDURE — 99214 OFFICE O/P EST MOD 30 MIN: CPT | Performed by: NURSE PRACTITIONER

## 2018-11-20 RX ORDER — GLIPIZIDE 10 MG/1
20 TABLET, FILM COATED, EXTENDED RELEASE ORAL
Qty: 180 TABLET | Refills: 1 | Status: SHIPPED | OUTPATIENT
Start: 2018-11-20 | End: 2019-02-06

## 2018-11-20 RX ORDER — LANCETS 33 GAUGE
EACH MISCELLANEOUS
Qty: 1 BOX | Refills: 11 | Status: SHIPPED | OUTPATIENT
Start: 2018-11-20 | End: 2021-08-25

## 2018-11-20 RX ORDER — ATORVASTATIN CALCIUM 20 MG/1
20 TABLET, FILM COATED ORAL DAILY
Qty: 30 TABLET | Refills: 3 | Status: SHIPPED | OUTPATIENT
Start: 2018-11-20 | End: 2019-02-26

## 2018-11-20 RX ORDER — BLOOD SUGAR DIAGNOSTIC
STRIP MISCELLANEOUS
Qty: 100 STRIP | Refills: 11 | Status: SHIPPED | OUTPATIENT
Start: 2018-11-20 | End: 2019-03-12

## 2018-11-20 NOTE — PATIENT INSTRUCTIONS
We are her to support you with Diabetes but please remember that you still need your primary care doctor for your routine health maintenance. Your A1C: 7.7%  Good job this is down from 8.3%  Your goal next visit is to be down in lower or below 7 % range. sugars and see how the medication works at different times of day.    Recommended blood sugar targets:  Before breakfast:   (preferably < 110)  2 hours After meals: less than 180 (preferably less than 150)   Call for persistent blood sugars < 75 or >

## 2018-11-20 NOTE — PROGRESS NOTES
Patient presents with:  Diabetes: follow up. pt did bring readings. HPI:   Anna Bonilla is a 48year old female presenting with type 2  DM medication management.  In the past 3m, DM control has improved slightly: 7.7%  (last A1C: 8.3% )   At  visit in DM ce (mg/dL)   Date Value   11/17/2018 100 (H)     AST (U/L)   Date Value   11/17/2018 13 (L)   02/08/2014 20   02/04/2012 12     ALT (U/L)   Date Value   02/08/2014 42   02/04/2012 18     Alt (U/L)   Date Value   11/17/2018 27      Malb/Cre Calc   Date Value R brother with Type 2 DM   • Thyroid Disorder Neg    • Thyroid disease Neg        Current Outpatient Medications:  Empagliflozin (JARDIANCE) 25 MG Oral Tab Take 1 tablet by mouth once daily.  Disp: 90 tablet Rfl: 0   Dulaglutide (TRULICITY) 1.5 YF/5.8ZN Subcu for polydipsia, polyphagia and polyuria. Genitourinary: Negative for dysuria. Musculoskeletal: Negative for joint pain. Skin: Negative for rash. Neurological: Positive for numbness.    Hematological:        No hypoglycemia   Psychiatric/Behavioral: how eliminating this would have + impact on BG levels, weight and DM control   Discussed progressive nature of type 2 DM and how common it is to need more meds as time goes on       Educated patient on pathophysiology of type 2 DM and insulin resistance  P 3      DM Quality Indicators:  A1C as reported above  Retinopathy Screen: Due 12/2018 no hx DR   Nephropathy Screen: 11/2018 NEG   Continue ACEi  Depression Screen:  PHQ-2 1/13/2018   Feet exam: Reviewed feet precautions 1/13/2018   BP: as above  Lipids: L

## 2018-11-20 NOTE — ASSESSMENT & PLAN NOTE
A1C :   Lab Results   Component Value Date     (H) 11/17/2018    A1C 7.7 (H) 11/17/2018   down from 8.3%     Weight: 241 lb   Diabetes control is improved but NOT at goal.  Had lengthy discussion regarding poor glycemic control.  Patient was reminded

## 2018-11-24 ENCOUNTER — HOSPITAL ENCOUNTER (OUTPATIENT)
Dept: MAMMOGRAPHY | Age: 53
Discharge: HOME | End: 2018-11-24
Attending: FAMILY MEDICINE
Payer: COMMERCIAL

## 2018-11-24 DIAGNOSIS — Z12.31 SCREENING MAMMOGRAM, ENCOUNTER FOR: ICD-10-CM

## 2018-11-24 PROCEDURE — 77063 BREAST TOMOSYNTHESIS BI: CPT | Performed by: FAMILY MEDICINE

## 2018-11-24 PROCEDURE — 77067 SCR MAMMO BI INCL CAD: CPT | Performed by: FAMILY MEDICINE

## 2018-12-11 RX ORDER — ERGOCALCIFEROL 1.25 MG/1
50000 CAPSULE ORAL WEEKLY
Qty: 12 CAPSULE | Refills: 1 | OUTPATIENT
Start: 2018-12-11 | End: 2019-01-10

## 2018-12-11 NOTE — TELEPHONE ENCOUNTER
Ergocalciferol  Last OV relevant to medication: 9/24/18 (telephone)  Last refill date: 9/24/18 #/refills: 1   End date: 10/24/18  When pt was asked to return for OV: N/a  Upcoming appt/reason: none  Recent labs: 11/17/18

## 2018-12-13 DIAGNOSIS — E03.9 HYPOTHYROIDISM (ACQUIRED): ICD-10-CM

## 2018-12-13 RX ORDER — LEVOTHYROXINE SODIUM 137 UG/1
TABLET ORAL
Qty: 30 TABLET | Refills: 5 | Status: SHIPPED | OUTPATIENT
Start: 2018-12-13 | End: 2019-06-08

## 2018-12-13 NOTE — TELEPHONE ENCOUNTER
Approved per protocol  Levothyroxine   Last OV relevant to medication: 9/22/18  Last refill date: 5/29/18  #/refills: 5  When pt was asked to return for OV:  n/a  Upcoming appt/reason: none

## 2018-12-29 DIAGNOSIS — E11.40 TYPE 2 DIABETES, UNCONTROLLED, WITH NEUROPATHY (HCC): ICD-10-CM

## 2018-12-29 DIAGNOSIS — E11.65 TYPE 2 DIABETES, UNCONTROLLED, WITH NEUROPATHY (HCC): ICD-10-CM

## 2018-12-31 RX ORDER — DULAGLUTIDE 1.5 MG/.5ML
INJECTION, SOLUTION SUBCUTANEOUS
Qty: 2 ML | Refills: 2 | Status: SHIPPED | OUTPATIENT
Start: 2018-12-31 | End: 2019-03-05

## 2018-12-31 RX ORDER — GLIPIZIDE 5 MG/1
20 TABLET, FILM COATED, EXTENDED RELEASE ORAL DAILY
Qty: 120 TABLET | Refills: 0 | Status: SHIPPED | OUTPATIENT
Start: 2018-12-31 | End: 2019-02-01

## 2018-12-31 NOTE — TELEPHONE ENCOUNTER
LOV-11/20/18 cab  FOV-none  LAST RX-11/20/18 4 pen 2  refill  LAST LABS-11/17/18 a1c- 7.7  PER PROTOCOL-to provider

## 2018-12-31 NOTE — TELEPHONE ENCOUNTER
LOV-11/20/18 CAB  FOV-none  LAST RX-11/2018 180 tabs 1 refill  LAST LABS-11/17/18 a1c- 7.7  PER PROTOCOL-to provider

## 2019-01-04 ENCOUNTER — TELEPHONE (OUTPATIENT)
Dept: INTERNAL MEDICINE CLINIC | Facility: CLINIC | Age: 54
End: 2019-01-04

## 2019-01-06 DIAGNOSIS — I10 HYPERTENSION, ESSENTIAL, BENIGN: ICD-10-CM

## 2019-01-07 RX ORDER — LISINOPRIL AND HYDROCHLOROTHIAZIDE 20; 12.5 MG/1; MG/1
TABLET ORAL
Qty: 90 TABLET | Refills: 0 | Status: SHIPPED | OUTPATIENT
Start: 2019-01-07 | End: 2019-04-13

## 2019-01-29 DIAGNOSIS — E11.65 TYPE 2 DIABETES, UNCONTROLLED, WITH NEUROPATHY (HCC): ICD-10-CM

## 2019-01-29 DIAGNOSIS — E11.40 TYPE 2 DIABETES, UNCONTROLLED, WITH NEUROPATHY (HCC): ICD-10-CM

## 2019-01-29 RX ORDER — GLIPIZIDE 5 MG/1
20 TABLET, FILM COATED, EXTENDED RELEASE ORAL DAILY
Qty: 360 TABLET | Refills: 0 | Status: CANCELLED | OUTPATIENT
Start: 2019-01-29

## 2019-01-29 NOTE — TELEPHONE ENCOUNTER
LOV-11/20/18 CAB  FOV-none  LAST RX-12/31/18 120 tabs 0 refill  LAST LABS-11/17/18 a1c- 7.7  PER PROTOCOL-to provider

## 2019-01-31 DIAGNOSIS — E11.65 TYPE 2 DIABETES, UNCONTROLLED, WITH NEUROPATHY (HCC): ICD-10-CM

## 2019-01-31 DIAGNOSIS — E11.40 TYPE 2 DIABETES, UNCONTROLLED, WITH NEUROPATHY (HCC): ICD-10-CM

## 2019-01-31 NOTE — TELEPHONE ENCOUNTER
LOV-11/20/18 CAB  FOV-none  LAST RX-12/31/18 120 tabs 0 refills  LAST LABS-11/17/18 a1c-7.7  PER PROTOCOL-to provider

## 2019-02-01 RX ORDER — GLIPIZIDE 5 MG/1
20 TABLET, FILM COATED, EXTENDED RELEASE ORAL DAILY
Qty: 120 TABLET | Refills: 0 | OUTPATIENT
Start: 2019-02-01

## 2019-02-01 NOTE — TELEPHONE ENCOUNTER
2nd denial for refill rquest.   At last visit, I chnged the Glipizide XL from 5m tabs twice daily to 10mg : 2 tabs twice daily   Please call pharmacy and advise she ONLY needs Glipizide XL 10mg rx : 2 tabs twice daily as active

## 2019-02-04 ENCOUNTER — TELEPHONE (OUTPATIENT)
Dept: ENDOCRINOLOGY CLINIC | Facility: CLINIC | Age: 54
End: 2019-02-04

## 2019-02-04 ENCOUNTER — PATIENT MESSAGE (OUTPATIENT)
Dept: ENDOCRINOLOGY CLINIC | Facility: CLINIC | Age: 54
End: 2019-02-04

## 2019-02-04 DIAGNOSIS — E11.40 TYPE 2 DIABETES, UNCONTROLLED, WITH NEUROPATHY (HCC): Primary | ICD-10-CM

## 2019-02-04 DIAGNOSIS — E11.65 TYPE 2 DIABETES, UNCONTROLLED, WITH NEUROPATHY (HCC): Primary | ICD-10-CM

## 2019-02-04 NOTE — TELEPHONE ENCOUNTER
Patient requested sample of Trulicity 1.5 via [employer]. Per Schuyler Sample, ABDOULAYEN,, OK to give samples. Contacted pt that she can pick them up at office today before 3:30 or anytime tomorrow (2/5/19).

## 2019-02-05 NOTE — TELEPHONE ENCOUNTER
Called all 3 numbers on file. Home and cell- no answer and was unable to lm.  Work phone was a wrong number     Sending letter via [employer] [facility]

## 2019-02-06 DIAGNOSIS — E11.65 TYPE 2 DIABETES, UNCONTROLLED, WITH NEUROPATHY (HCC): Primary | ICD-10-CM

## 2019-02-06 DIAGNOSIS — E11.40 TYPE 2 DIABETES, UNCONTROLLED, WITH NEUROPATHY (HCC): Primary | ICD-10-CM

## 2019-02-06 RX ORDER — GLIPIZIDE 10 MG/1
TABLET, FILM COATED, EXTENDED RELEASE ORAL
Qty: 3660 TABLET | Refills: 1 | Status: SHIPPED | OUTPATIENT
Start: 2019-02-06 | End: 2019-02-07

## 2019-02-06 RX ORDER — GLIPIZIDE 10 MG/1
TABLET, FILM COATED, EXTENDED RELEASE ORAL
Qty: 360 TABLET | Refills: 1 | Status: CANCELLED | OUTPATIENT
Start: 2019-02-06

## 2019-02-06 NOTE — TELEPHONE ENCOUNTER
Called pharmacy again to let them know. Prescription dosage has changed and glipizide quantity is 360 tabs not 3660.

## 2019-02-07 ENCOUNTER — TELEPHONE (OUTPATIENT)
Dept: INTERNAL MEDICINE CLINIC | Facility: CLINIC | Age: 54
End: 2019-02-07

## 2019-02-07 DIAGNOSIS — E11.40 TYPE 2 DIABETES, UNCONTROLLED, WITH NEUROPATHY (HCC): ICD-10-CM

## 2019-02-07 DIAGNOSIS — E11.65 TYPE 2 DIABETES, UNCONTROLLED, WITH NEUROPATHY (HCC): ICD-10-CM

## 2019-02-07 RX ORDER — GLIPIZIDE 10 MG/1
TABLET, FILM COATED, EXTENDED RELEASE ORAL
Qty: 360 TABLET | Refills: 1 | COMMUNITY
Start: 2019-02-07 | End: 2019-03-04 | Stop reason: SDUPTHER

## 2019-02-07 NOTE — TELEPHONE ENCOUNTER
Received fax from [facility] stating insurance wont cover 2 tab 2x daily for medication glipizide er10 mg that a PA had to be started and it was started thru cover my meds [id number] it was not able to be processed.  Called [place] [phone] told me a PA is not need

## 2019-02-08 DIAGNOSIS — F41.9 ANXIETY: ICD-10-CM

## 2019-02-08 NOTE — TELEPHONE ENCOUNTER
Refill requested:   Requested Prescriptions     Pending Prescriptions Disp Refills   • SERTRALINE HCL 50 MG Oral Tab [Pharmacy Med Name: SERTRALINE HCL 50 MG TABLET] 90 tablet 0     Sig: TAKE 1 TABLET BY MOUTH EVERY DAY     Failed protocol    Last refill:

## 2019-02-26 RX ORDER — ATORVASTATIN CALCIUM 20 MG/1
20 TABLET, FILM COATED ORAL DAILY
Qty: 30 TABLET | Refills: 3 | Status: SHIPPED | OUTPATIENT
Start: 2019-02-26 | End: 2019-04-08

## 2019-02-28 VITALS
SYSTOLIC BLOOD PRESSURE: 112 MMHG | HEART RATE: 79 BPM | DIASTOLIC BLOOD PRESSURE: 70 MMHG | BODY MASS INDEX: 40.32 KG/M2 | HEIGHT: 65 IN | WEIGHT: 242 LBS

## 2019-03-04 RX ORDER — GLIPIZIDE 10 MG/1
TABLET, FILM COATED, EXTENDED RELEASE ORAL
Qty: 180 TABLET | Refills: 1 | Status: SHIPPED | OUTPATIENT
Start: 2019-03-04 | End: 2019-09-13

## 2019-03-05 RX ORDER — DULAGLUTIDE 1.5 MG/.5ML
INJECTION, SOLUTION SUBCUTANEOUS
Qty: 2 ML | Refills: 1 | Status: SHIPPED | OUTPATIENT
Start: 2019-03-05 | End: 2019-03-12 | Stop reason: ALTCHOICE

## 2019-03-05 NOTE — TELEPHONE ENCOUNTER
LOV-11/20/18 CAB  FOV-3/12/19 CAB  LAST RX-2/4/19 4 pen 0 refill  LAST LABS-11/17/18 a1c- 7.7  PER PROTOCOL-to provider

## 2019-03-06 RX ORDER — ERGOCALCIFEROL 1.25 MG/1
CAPSULE ORAL
Qty: 12 CAPSULE | Refills: 1 | OUTPATIENT
Start: 2019-03-06

## 2019-03-06 NOTE — TELEPHONE ENCOUNTER
Called pt. Notified that she needs to come in for lab work. She is aware and asked if she needs to fast, looked and she does have a lipid panel ordered so told pt she will need to fast. She stated she will go to the [facility] location on Saturday morning.

## 2019-03-09 ENCOUNTER — APPOINTMENT (OUTPATIENT)
Dept: LAB | Facility: HOSPITAL | Age: 54
End: 2019-03-09
Attending: FAMILY MEDICINE
Payer: COMMERCIAL

## 2019-03-09 DIAGNOSIS — E78.5 HYPERLIPIDEMIA LDL GOAL <100: ICD-10-CM

## 2019-03-09 DIAGNOSIS — R74.8 ELEVATED ALKALINE PHOSPHATASE LEVEL: ICD-10-CM

## 2019-03-09 DIAGNOSIS — E55.9 VITAMIN D DEFICIENCY: ICD-10-CM

## 2019-03-09 LAB
ALBUMIN SERPL-MCNC: 3.7 G/DL (ref 3.4–5)
ALBUMIN/GLOB SERPL: 1 {RATIO} (ref 1–2)
ALP LIVER SERPL-CCNC: 113 U/L (ref 41–108)
ALT SERPL-CCNC: 29 U/L (ref 13–56)
ANION GAP SERPL CALC-SCNC: 7 MMOL/L (ref 0–18)
AST SERPL-CCNC: 14 U/L (ref 15–37)
BILIRUB SERPL-MCNC: 1 MG/DL (ref 0.1–2)
BUN BLD-MCNC: 18 MG/DL (ref 7–18)
BUN/CREAT SERPL: 23.4 (ref 10–20)
CALCIUM BLD-MCNC: 8.9 MG/DL (ref 8.5–10.1)
CHLORIDE SERPL-SCNC: 104 MMOL/L (ref 98–107)
CHOLEST SMN-MCNC: 136 MG/DL (ref ?–200)
CO2 SERPL-SCNC: 27 MMOL/L (ref 21–32)
CREAT BLD-MCNC: 0.77 MG/DL (ref 0.55–1.02)
GLOBULIN PLAS-MCNC: 3.6 G/DL (ref 2.8–4.4)
GLUCOSE BLD-MCNC: 163 MG/DL (ref 70–99)
HDLC SERPL-MCNC: 38 MG/DL (ref 40–59)
LDLC SERPL CALC-MCNC: 73 MG/DL (ref ?–100)
M PROTEIN MFR SERPL ELPH: 7.3 G/DL (ref 6.4–8.2)
NONHDLC SERPL-MCNC: 98 MG/DL (ref ?–130)
OSMOLALITY SERPL CALC.SUM OF ELEC: 291 MOSM/KG (ref 275–295)
POTASSIUM SERPL-SCNC: 3.8 MMOL/L (ref 3.5–5.1)
SODIUM SERPL-SCNC: 138 MMOL/L (ref 136–145)
TRIGL SERPL-MCNC: 125 MG/DL (ref 30–149)
VIT D+METAB SERPL-MCNC: 50.8 NG/ML (ref 30–100)
VLDLC SERPL CALC-MCNC: 25 MG/DL (ref 0–30)

## 2019-03-09 PROCEDURE — 82306 VITAMIN D 25 HYDROXY: CPT

## 2019-03-09 PROCEDURE — 80053 COMPREHEN METABOLIC PANEL: CPT

## 2019-03-09 PROCEDURE — 80061 LIPID PANEL: CPT

## 2019-03-09 PROCEDURE — 36415 COLL VENOUS BLD VENIPUNCTURE: CPT

## 2019-03-12 ENCOUNTER — OFFICE VISIT (OUTPATIENT)
Dept: ENDOCRINOLOGY CLINIC | Facility: CLINIC | Age: 54
End: 2019-03-12
Payer: COMMERCIAL

## 2019-03-12 VITALS
HEART RATE: 68 BPM | BODY MASS INDEX: 38.85 KG/M2 | DIASTOLIC BLOOD PRESSURE: 58 MMHG | SYSTOLIC BLOOD PRESSURE: 98 MMHG | WEIGHT: 236 LBS | RESPIRATION RATE: 20 BRPM | HEIGHT: 65.5 IN | TEMPERATURE: 98 F

## 2019-03-12 DIAGNOSIS — E11.65 TYPE 2 DIABETES, UNCONTROLLED, WITH NEUROPATHY (HCC): Primary | ICD-10-CM

## 2019-03-12 DIAGNOSIS — E11.40 TYPE 2 DIABETES, UNCONTROLLED, WITH NEUROPATHY (HCC): Primary | ICD-10-CM

## 2019-03-12 DIAGNOSIS — I10 HYPERTENSION, ESSENTIAL, BENIGN: ICD-10-CM

## 2019-03-12 DIAGNOSIS — E78.5 HYPERLIPIDEMIA LDL GOAL <100: ICD-10-CM

## 2019-03-12 LAB
CARTRIDGE LOT#: 973 NUMERIC
HEMOGLOBIN A1C: 7.7 % (ref 4.3–5.6)

## 2019-03-12 PROCEDURE — 83036 HEMOGLOBIN GLYCOSYLATED A1C: CPT | Performed by: NURSE PRACTITIONER

## 2019-03-12 PROCEDURE — 99214 OFFICE O/P EST MOD 30 MIN: CPT | Performed by: NURSE PRACTITIONER

## 2019-03-12 RX ORDER — PEN NEEDLE, DIABETIC 30 GX3/16"
1 NEEDLE, DISPOSABLE MISCELLANEOUS
Qty: 30 EACH | Refills: 0 | Status: SHIPPED | OUTPATIENT
Start: 2019-03-12 | End: 2020-06-17 | Stop reason: ALTCHOICE

## 2019-03-12 NOTE — PROGRESS NOTES
Patient presents with:  Diabetes: room-17 cf. pt did bring meter. HPI:   Bernie Graf is a 48year old female presenting with type 2  DM medication management.  In the past 3m, DM control has remained above target: A1C 7.7 today (last A1C  slightly: 7.7% ) Anxiety state, unspecified    • Essential hypertension, benign 2/3/2014   • Hypothyroidism     Dx at age 38   • Type II or unspecified type diabetes mellitus without mention of complication, not stated as uncontrolled    • Unspecified essential hypertensio nightly as needed for Sleep. Disp: 90 tablet Rfl: 0   Metoprolol Succinate ER 25 MG Oral Tablet 24 Hr Take 25 mg by mouth once daily.  Disp:  Rfl: 3   Glucose Blood (ONETOUCH VERIO) In Vitro Strip Test 2 times daily Disp: 200 strip Rfl: 1   Albuterol Sulfat Skin is warm and dry. Psychiatric: She has a normal mood and affect. Assessment/Plan:  Hyperlipidemia LDL goal <100  3-2019 labs done   LDL, HDL improved w statin rx   Tolerating well.    CPM     Hypertension, essential, benign  Well controlled  CPM BP: as above  Lipids: LDL 73;Taking Atorvastatin rx 3-2019    Dentist: recommended every 6 months      The patient indicates understanding of these issues and agrees to the plan.     The risks and benefits of my recommendations, as well as other treatment

## 2019-03-12 NOTE — PATIENT INSTRUCTIONS
Your A1C 7.7%   Unchanged from last time ~  The main goal of diabetes treatment is to keep your sugar from going too high.  We measure your overall blood sugar trends with a Hemoglobin A1C test. (also called an A1C)  For most people the target is less than

## 2019-04-08 RX ORDER — ATORVASTATIN CALCIUM 20 MG/1
20 TABLET, FILM COATED ORAL DAILY
Qty: 30 TABLET | Refills: 3 | Status: SHIPPED | OUTPATIENT
Start: 2019-04-08 | End: 2019-08-18

## 2019-04-13 DIAGNOSIS — I10 HYPERTENSION, ESSENTIAL, BENIGN: ICD-10-CM

## 2019-04-15 RX ORDER — LISINOPRIL AND HYDROCHLOROTHIAZIDE 20; 12.5 MG/1; MG/1
TABLET ORAL
Qty: 90 TABLET | Refills: 0 | Status: SHIPPED | OUTPATIENT
Start: 2019-04-15 | End: 2019-08-05

## 2019-05-20 RX ORDER — ALPRAZOLAM 0.5 MG/1
TABLET ORAL
Qty: 90 TABLET | OUTPATIENT
Start: 2019-05-20

## 2019-05-20 NOTE — TELEPHONE ENCOUNTER
Alprazolam  Last OV relevant to medication: 9-22-18  Last refill date: 9-22-18  #/refills: 0  When pt was asked to return for OV: CPX 1 yr  Upcoming appt/reason: none  Recent labs: none

## 2019-05-21 RX ORDER — ALPRAZOLAM 0.5 MG/1
0.5 TABLET ORAL NIGHTLY PRN
Qty: 30 TABLET | Refills: 0 | Status: SHIPPED | OUTPATIENT
Start: 2019-05-21 | End: 2019-09-26

## 2019-05-21 NOTE — TELEPHONE ENCOUNTER
Pt is scheduled for an appt with [facility] June 8 for a med check. Pt wanted to schedule her physical but she isn't due til 9-2019.

## 2019-05-31 RX ORDER — METOPROLOL SUCCINATE 25 MG/1
TABLET, EXTENDED RELEASE ORAL
Qty: 30 TABLET | Refills: 0 | Status: SHIPPED | OUTPATIENT
Start: 2019-05-31 | End: 2019-06-17 | Stop reason: SDUPTHER

## 2019-06-08 ENCOUNTER — OFFICE VISIT (OUTPATIENT)
Dept: INTERNAL MEDICINE CLINIC | Facility: CLINIC | Age: 54
End: 2019-06-08
Payer: COMMERCIAL

## 2019-06-08 ENCOUNTER — HOSPITAL ENCOUNTER (OUTPATIENT)
Dept: GENERAL RADIOLOGY | Age: 54
Discharge: HOME | End: 2019-06-08
Attending: FAMILY MEDICINE
Payer: COMMERCIAL

## 2019-06-08 VITALS
RESPIRATION RATE: 14 BRPM | BODY MASS INDEX: 39.3 KG/M2 | SYSTOLIC BLOOD PRESSURE: 112 MMHG | HEIGHT: 65.5 IN | WEIGHT: 238.75 LBS | OXYGEN SATURATION: 97 % | DIASTOLIC BLOOD PRESSURE: 72 MMHG | TEMPERATURE: 99 F | HEART RATE: 72 BPM

## 2019-06-08 DIAGNOSIS — IMO0002 TYPE 2 DIABETES, UNCONTROLLED, WITH NEUROPATHY: ICD-10-CM

## 2019-06-08 DIAGNOSIS — E11.40 TYPE 2 DIABETES, UNCONTROLLED, WITH NEUROPATHY (HCC): Primary | ICD-10-CM

## 2019-06-08 DIAGNOSIS — F41.9 ANXIETY: ICD-10-CM

## 2019-06-08 DIAGNOSIS — E11.65 TYPE 2 DIABETES, UNCONTROLLED, WITH NEUROPATHY (HCC): Primary | ICD-10-CM

## 2019-06-08 DIAGNOSIS — M25.522 LEFT ELBOW PAIN: ICD-10-CM

## 2019-06-08 DIAGNOSIS — E03.9 HYPOTHYROIDISM (ACQUIRED): ICD-10-CM

## 2019-06-08 DIAGNOSIS — I10 HYPERTENSION, ESSENTIAL, BENIGN: ICD-10-CM

## 2019-06-08 DIAGNOSIS — Z00.00 LABORATORY EXAMINATION ORDERED AS PART OF A ROUTINE GENERAL MEDICAL EXAMINATION: ICD-10-CM

## 2019-06-08 PROCEDURE — 99214 OFFICE O/P EST MOD 30 MIN: CPT | Performed by: FAMILY MEDICINE

## 2019-06-08 PROCEDURE — 73080 X-RAY EXAM OF ELBOW: CPT | Performed by: FAMILY MEDICINE

## 2019-06-08 PROCEDURE — 84443 ASSAY THYROID STIM HORMONE: CPT | Performed by: FAMILY MEDICINE

## 2019-06-08 RX ORDER — METOPROLOL SUCCINATE 25 MG/1
1 TABLET, EXTENDED RELEASE ORAL DAILY
COMMUNITY
Start: 2019-05-31

## 2019-06-08 RX ORDER — LEVOTHYROXINE SODIUM 137 UG/1
TABLET ORAL
Qty: 30 TABLET | Refills: 0 | Status: SHIPPED | OUTPATIENT
Start: 2019-06-08 | End: 2019-09-02

## 2019-06-08 NOTE — PROGRESS NOTES
HPI:   Esme Sweeney is a 48year old female who presents for recheck of her diabetes. Pt has been seeing the diabetic clinic regularly. Patient’s FBS have been 120-220. Pt not perfect with her diet especially with soda.    Last visit with ophthalmolog lb    Body mass index is 39.13 kg/m².      HEMOGLOBIN A1C (%)   Date Value   03/12/2019 7.7 (A)   01/13/2018 9.2 (A)   02/15/2017 9.9 (A)     HgbA1C (%)   Date Value   11/17/2018 7.7 (H)   09/22/2018 8.3 (H)   05/05/2018 9.4 (H)     Cholesterol, Total (mg/d tablet Rfl: 3   Insulin Pen Needle (PEN NEEDLES) 32G X 4 MM Does not apply Misc 1 each by Does not apply route every 7 days. Disp: 30 each Rfl: 0   Empagliflozin (JARDIANCE) 25 MG Oral Tab Take 1 tablet by mouth once daily.  Disp: 90 tablet Rfl: 0   Semaglu denies chest pain on exertion. GI: denies abdominal pain or heartburn. No N/V/D/C.   NEURO: denies headaches or dizziness.     EXAM:   /72 (BP Location: Left arm, Patient Position: Sitting, Cuff Size: adult)   Pulse 72   Temp 98.5 °F (36.9 °C) (Ora Hemoglobin A1C [E]      Microalb/Creat Ratio, Random Urine      Meds & Refills for this Visit:  Requested Prescriptions     Signed Prescriptions Disp Refills   • Sertraline HCl 50 MG Oral Tab 90 tablet 0     Sig: Take 1 tablet (50 mg total) by mouth once d

## 2019-06-10 ENCOUNTER — TELEPHONE (OUTPATIENT)
Dept: INTERNAL MEDICINE CLINIC | Facility: CLINIC | Age: 54
End: 2019-06-10

## 2019-06-10 RX ORDER — EMPAGLIFLOZIN 25 MG/1
TABLET, FILM COATED ORAL
Qty: 90 TABLET | Refills: 0 | Status: SHIPPED | OUTPATIENT
Start: 2019-06-10 | End: 2019-08-08

## 2019-06-10 NOTE — TELEPHONE ENCOUNTER
Pt called back and scheduled appt for   Future Appointments   Date Time Provider Minerva Garnica   7/23/2019  4:15 PM FELY Chairez [facility] [address] [address]

## 2019-06-17 RX ORDER — METOPROLOL SUCCINATE 25 MG/1
TABLET, EXTENDED RELEASE ORAL
Qty: 30 TABLET | Refills: 0 | Status: SHIPPED | OUTPATIENT
Start: 2019-06-17 | End: 2019-07-24 | Stop reason: SDUPTHER

## 2019-06-22 RX ORDER — ERGOCALCIFEROL 1.25 MG/1
CAPSULE ORAL
Qty: 12 CAPSULE | Refills: 1 | OUTPATIENT
Start: 2019-06-22

## 2019-07-08 ENCOUNTER — TELEPHONE (OUTPATIENT)
Dept: CARDIOLOGY | Age: 54
End: 2019-07-08

## 2019-07-15 RX ORDER — SEMAGLUTIDE 1.34 MG/ML
INJECTION, SOLUTION SUBCUTANEOUS
Qty: 1.5 ML | Refills: 2 | Status: SHIPPED | OUTPATIENT
Start: 2019-07-15 | End: 2019-07-23

## 2019-07-23 ENCOUNTER — DIABETIC EDUCATION (OUTPATIENT)
Dept: ENDOCRINOLOGY CLINIC | Facility: CLINIC | Age: 54
End: 2019-07-23
Payer: COMMERCIAL

## 2019-07-23 ENCOUNTER — OFFICE VISIT (OUTPATIENT)
Dept: ENDOCRINOLOGY CLINIC | Facility: CLINIC | Age: 54
End: 2019-07-23
Payer: COMMERCIAL

## 2019-07-23 VITALS
WEIGHT: 235 LBS | DIASTOLIC BLOOD PRESSURE: 60 MMHG | SYSTOLIC BLOOD PRESSURE: 90 MMHG | HEART RATE: 70 BPM | BODY MASS INDEX: 38.68 KG/M2 | HEIGHT: 65.5 IN | RESPIRATION RATE: 20 BRPM

## 2019-07-23 DIAGNOSIS — E11.40 TYPE 2 DIABETES, UNCONTROLLED, WITH NEUROPATHY (HCC): Primary | ICD-10-CM

## 2019-07-23 DIAGNOSIS — E11.65 TYPE 2 DIABETES, UNCONTROLLED, WITH NEUROPATHY (HCC): Primary | ICD-10-CM

## 2019-07-23 LAB
CARTRIDGE LOT#: 219 NUMERIC
HEMOGLOBIN A1C: 9.1 % (ref 4.3–5.6)

## 2019-07-23 PROCEDURE — 83036 HEMOGLOBIN GLYCOSYLATED A1C: CPT | Performed by: NURSE PRACTITIONER

## 2019-07-23 PROCEDURE — 97802 MEDICAL NUTRITION INDIV IN: CPT | Performed by: DIETITIAN, REGISTERED

## 2019-07-23 PROCEDURE — 99214 OFFICE O/P EST MOD 30 MIN: CPT | Performed by: NURSE PRACTITIONER

## 2019-07-23 RX ORDER — FLASH GLUCOSE SENSOR
KIT MISCELLANEOUS
Qty: 2 EACH | Refills: 1 | Status: SHIPPED | OUTPATIENT
Start: 2019-07-23 | End: 2019-09-26

## 2019-07-23 NOTE — PROGRESS NOTES
Patient presents with:  Diabetes: ROOM-16 cf. pt did bring meter. HPI:   Christin Cohen is a 48year old female presenting with type 2  DM medication management.  In the past 3m, DM control has unfortunately increased to 9.1% (last A1C: 7.7%)   She is surpris polydipsia: no  Paresthesias: yes, LE   Blurred vision: no  Hypoglycemia: no    DM Complications:  Microvascular:   Neuropathy: yes  Retinopathy: no  Nephropathy: no    Macrovascular:  PVD: no  CAD: no  Stroke/CVA: no    Modifying factors:  Medication adhe Oral Tab Take 1 tablet (50 mg total) by mouth once daily.  Disp: 90 tablet Rfl: 0   Levothyroxine Sodium 137 MCG Oral Tab TAKE 1 TABLET BY MOUTH EVERY DAY BEFORE BREAKFAST Disp: 30 tablet Rfl: 0   LISINOPRIL-HYDROCHLOROTHIAZIDE 20-12.5 MG Oral Tab TAKE 1 TA Body mass index is 38.51 kg/m². Physical Exam   Vitals reviewed. Constitutional: She is oriented to person, place, and time and obese. She appears well-developed. Cardiovascular: Regular rhythm.     Pulmonary/Chest: Effort normal.   Neurological: She each          Refill:  1      Semaglutide (OZEMPIC) 1 MG/DOSE Subcutaneous Solution Pen-injector          Sig: Inject 1 mg into the skin every 7 days.           Dispense:  3 mL          Refill:  2      DM Quality Indicators:  A1C as reported above  Retinopa

## 2019-07-23 NOTE — PATIENT INSTRUCTIONS
Your A1C is 9.1%   This is up from 7.7%  Lets get started on Yehuda personal continuous glucose sensor to allow you to closely monitor your trends     Diabetes medicines  Increase Ozempic to 1mg once weekly   Ok to give 0.5mg in 2 injections w the sample I

## 2019-07-23 NOTE — PROGRESS NOTES
Esme Sweeney  9/27/1965 was seen for Initial Personal [place] Continuous Glucose Sensor Instruction    7/23/2019  Start time: 4:30 End time: 5:00   Referring Provider: Lin De La Paz pt on personal Blackman sample Serial [id number], [id number] [facility]

## 2019-07-24 ENCOUNTER — E-ADVICE (OUTPATIENT)
Dept: CARDIOLOGY | Age: 54
End: 2019-07-24

## 2019-07-24 RX ORDER — METOPROLOL SUCCINATE 25 MG/1
25 TABLET, EXTENDED RELEASE ORAL DAILY
Qty: 90 TABLET | Refills: 0 | Status: SHIPPED | OUTPATIENT
Start: 2019-07-24 | End: 2019-08-08 | Stop reason: SDUPTHER

## 2019-07-24 RX ORDER — METOPROLOL SUCCINATE 25 MG/1
25 TABLET, EXTENDED RELEASE ORAL DAILY
Qty: 30 TABLET | Refills: 0 | Status: CANCELLED | OUTPATIENT
Start: 2019-07-24

## 2019-08-05 DIAGNOSIS — I10 HYPERTENSION, ESSENTIAL, BENIGN: ICD-10-CM

## 2019-08-05 RX ORDER — LISINOPRIL AND HYDROCHLOROTHIAZIDE 20; 12.5 MG/1; MG/1
TABLET ORAL
Qty: 90 TABLET | Refills: 0 | Status: SHIPPED | OUTPATIENT
Start: 2019-08-05 | End: 2019-10-28

## 2019-08-08 ENCOUNTER — OFFICE VISIT (OUTPATIENT)
Dept: ENDOCRINOLOGY CLINIC | Facility: CLINIC | Age: 54
End: 2019-08-08
Payer: COMMERCIAL

## 2019-08-08 VITALS
WEIGHT: 233 LBS | HEIGHT: 65.5 IN | SYSTOLIC BLOOD PRESSURE: 96 MMHG | RESPIRATION RATE: 16 BRPM | HEART RATE: 66 BPM | BODY MASS INDEX: 38.35 KG/M2 | DIASTOLIC BLOOD PRESSURE: 62 MMHG

## 2019-08-08 DIAGNOSIS — E11.40 TYPE 2 DIABETES, UNCONTROLLED, WITH NEUROPATHY (HCC): ICD-10-CM

## 2019-08-08 DIAGNOSIS — E11.65 TYPE 2 DIABETES, UNCONTROLLED, WITH NEUROPATHY (HCC): ICD-10-CM

## 2019-08-08 LAB
CARTRIDGE EXPIRATION DATE: 2021 DATE
CARTRIDGE LOT#: 540 NUMERIC
CREAT UR-SCNC: 93.9 MG/DL
HEMOGLOBIN A1C: 8.3 % (ref 4.3–5.6)
MICROALBUMIN UR-MCNC: 0.7 MG/DL
MICROALBUMIN/CREAT 24H UR-RTO: 7.5 UG/MG (ref ?–30)

## 2019-08-08 PROCEDURE — 95251 CONT GLUC MNTR ANALYSIS I&R: CPT | Performed by: NURSE PRACTITIONER

## 2019-08-08 PROCEDURE — 82570 ASSAY OF URINE CREATININE: CPT | Performed by: NURSE PRACTITIONER

## 2019-08-08 PROCEDURE — 83036 HEMOGLOBIN GLYCOSYLATED A1C: CPT | Performed by: NURSE PRACTITIONER

## 2019-08-08 PROCEDURE — 99214 OFFICE O/P EST MOD 30 MIN: CPT | Performed by: NURSE PRACTITIONER

## 2019-08-08 PROCEDURE — 82043 UR ALBUMIN QUANTITATIVE: CPT | Performed by: NURSE PRACTITIONER

## 2019-08-08 RX ORDER — METOPROLOL SUCCINATE 25 MG/1
TABLET, EXTENDED RELEASE ORAL
Qty: 85 TABLET | Refills: 1 | Status: SHIPPED | OUTPATIENT
Start: 2019-08-08 | End: 2019-09-25 | Stop reason: SDUPTHER

## 2019-08-08 NOTE — PATIENT INSTRUCTIONS
Your trends are better   Use the sensor to your benefit     Continue weight watchers to help you with portions  Be careful with fruit     Calorie : use this as a resource     I will mail the voucher for bridger     Current DM Regimen:  Glipizide XL 10mg:

## 2019-08-08 NOTE — PROGRESS NOTES
Patient presents with:  Diabetes: Room 17-, pt has personal bridger      HPI:   Bonnie Taylor is a 48year old female presenting with type 2  DM medication management.  In the past 2m, DM control has improved to 8.3% (from 9.1%)         Feels the [place] sensor is symptoms:   Polyuria, polyphagia, polydipsia: no  Paresthesias: yes, LE   Blurred vision: no  Hypoglycemia: no    DM Complications:  Microvascular:   Neuropathy: yes  Retinopathy: no  Nephropathy: no    Macrovascular:  PVD: no  CAD: no  Stroke/CVA: no    M the skin every 7 days. Disp: 3 mL Rfl: 2   Metoprolol Succinate ER 25 MG Oral Tablet 24 Hr Take 1 tablet by mouth daily. Disp:  Rfl:    Sertraline HCl 50 MG Oral Tab Take 1 tablet (50 mg total) by mouth once daily.  Disp: 90 tablet Rfl: 0   Levothyroxine So 38.18 kg/m²   Body mass index is 38.18 kg/m². Physical Exam   Vitals reviewed. Constitutional: She is oriented to person, place, and time and obese. She appears well-developed. Cardiovascular: Regular rhythm.     Pulmonary/Chest: Effort normal.   Neuro [place] for further refills      DM Quality Indicators:  A1C as reported above  Retinopathy Screen: Due 12/22/2018 no hx DR   Nephropathy Screen: 11/2018 NEG  - collected specimen today  Continue ACEi  Depression Screen:  PHQ-2 3- NEG    Feet exam: R

## 2019-08-19 ENCOUNTER — TELEPHONE (OUTPATIENT)
Dept: INTERNAL MEDICINE CLINIC | Facility: CLINIC | Age: 54
End: 2019-08-19

## 2019-08-19 RX ORDER — ATORVASTATIN CALCIUM 20 MG/1
20 TABLET, FILM COATED ORAL DAILY
Qty: 30 TABLET | Refills: 3 | Status: SHIPPED | OUTPATIENT
Start: 2019-08-19 | End: 2020-03-30

## 2019-08-22 ENCOUNTER — PATIENT MESSAGE (OUTPATIENT)
Dept: INTERNAL MEDICINE CLINIC | Facility: CLINIC | Age: 54
End: 2019-08-22

## 2019-08-22 NOTE — TELEPHONE ENCOUNTER
From: Kong Lewis  To: FELY Santo  Sent: 8/22/2019 11:54 AM CDT  Subject: Other    Hello, I have an appointment on September 24th at 5:30.  My  Tina Durant also had an appointment the same day at [address] [place] [address],[address], but his was changed due to [facility]

## 2019-09-02 DIAGNOSIS — E03.9 HYPOTHYROIDISM (ACQUIRED): ICD-10-CM

## 2019-09-03 RX ORDER — LEVOTHYROXINE SODIUM 137 UG/1
TABLET ORAL
Qty: 30 TABLET | Refills: 3 | Status: SHIPPED | OUTPATIENT
Start: 2019-09-03 | End: 2019-12-11

## 2019-09-11 DIAGNOSIS — F41.9 ANXIETY: ICD-10-CM

## 2019-09-12 NOTE — TELEPHONE ENCOUNTER
Sertraline HCl 50 Mg  Last OV relevant to medication: 6-8-19  Last refill date: 6-8-19 #/refills: 0  When pt was asked to return for OV: 3-6 mo.    Upcoming appt/reason: 9-28-19  Recent labs: none

## 2019-09-16 RX ORDER — GLIPIZIDE 10 MG/1
TABLET, FILM COATED, EXTENDED RELEASE ORAL
Qty: 180 TABLET | Refills: 1 | Status: SHIPPED | OUTPATIENT
Start: 2019-09-16 | End: 2020-03-05

## 2019-09-25 ENCOUNTER — OFFICE VISIT (OUTPATIENT)
Dept: CARDIOLOGY | Age: 54
End: 2019-09-25

## 2019-09-25 ENCOUNTER — PATIENT MESSAGE (OUTPATIENT)
Dept: ENDOCRINOLOGY CLINIC | Facility: CLINIC | Age: 54
End: 2019-09-25

## 2019-09-25 VITALS
BODY MASS INDEX: 38.32 KG/M2 | WEIGHT: 230 LBS | HEIGHT: 65 IN | HEART RATE: 84 BPM | SYSTOLIC BLOOD PRESSURE: 100 MMHG | DIASTOLIC BLOOD PRESSURE: 50 MMHG

## 2019-09-25 DIAGNOSIS — E03.9 HYPOTHYROIDISM (ACQUIRED): ICD-10-CM

## 2019-09-25 DIAGNOSIS — I47.10 PSVT (PAROXYSMAL SUPRAVENTRICULAR TACHYCARDIA): Primary | ICD-10-CM

## 2019-09-25 PROCEDURE — 99213 OFFICE O/P EST LOW 20 MIN: CPT | Performed by: INTERNAL MEDICINE

## 2019-09-25 RX ORDER — LEVOTHYROXINE SODIUM 137 UG/1
TABLET ORAL
COMMUNITY
Start: 2017-06-26 | End: 2020-11-16

## 2019-09-25 RX ORDER — ALBUTEROL SULFATE 90 UG/1
2 AEROSOL, METERED RESPIRATORY (INHALATION)
COMMUNITY
Start: 2016-03-22

## 2019-09-25 RX ORDER — LEVOTHYROXINE SODIUM 137 UG/1
TABLET ORAL
Qty: 30 TABLET | Refills: 3 | OUTPATIENT
Start: 2019-09-25

## 2019-09-25 RX ORDER — LISINOPRIL AND HYDROCHLOROTHIAZIDE 20; 12.5 MG/1; MG/1
TABLET ORAL
COMMUNITY
Start: 2017-06-26

## 2019-09-25 RX ORDER — PEN NEEDLE, DIABETIC 30 GX3/16"
1 NEEDLE, DISPOSABLE MISCELLANEOUS
COMMUNITY
Start: 2019-03-12

## 2019-09-25 RX ORDER — ATORVASTATIN CALCIUM 20 MG/1
20 TABLET, FILM COATED ORAL
COMMUNITY
Start: 2019-08-19

## 2019-09-25 RX ORDER — LANCETS 33 GAUGE
EACH MISCELLANEOUS
COMMUNITY
Start: 2018-11-20

## 2019-09-25 RX ORDER — METOPROLOL SUCCINATE 25 MG/1
25 TABLET, EXTENDED RELEASE ORAL DAILY
Qty: 90 TABLET | Refills: 3 | Status: SHIPPED | OUTPATIENT
Start: 2019-09-25 | End: 2020-10-30

## 2019-09-25 RX ORDER — GLIPIZIDE 10 MG/1
TABLET, FILM COATED, EXTENDED RELEASE ORAL
COMMUNITY
Start: 2019-09-16

## 2019-09-25 RX ORDER — ALPRAZOLAM 0.5 MG/1
0.5 TABLET ORAL
COMMUNITY
Start: 2019-05-21

## 2019-09-25 ASSESSMENT — PATIENT HEALTH QUESTIONNAIRE - PHQ9
1. LITTLE INTEREST OR PLEASURE IN DOING THINGS: NOT AT ALL
SUM OF ALL RESPONSES TO PHQ9 QUESTIONS 1 AND 2: 0
SUM OF ALL RESPONSES TO PHQ9 QUESTIONS 1 AND 2: 0
2. FEELING DOWN, DEPRESSED OR HOPELESS: NOT AT ALL

## 2019-09-25 NOTE — TELEPHONE ENCOUNTER
LOV: 8/8/19  No future OV    Assessment/Plan:    Continue  Ozempic to 1mg once weekly   Jardiance 25mg once daily  Glipizide XL 10mg BID   Praised pt for giving up regular soda ~ 8 d ago.    Continue carb controlling at meals/snacks/drinks   Reminded pt on

## 2019-09-25 NOTE — TELEPHONE ENCOUNTER
From: Jose Leslie  To: FELY Durán  Sent: 9/25/2019 7:34 AM CDT  Subject: Prescription Question    Good Morning,     Do you have any samples of Ozempic I could . I would appreciate it. Thank you.

## 2019-09-26 RX ORDER — FLASH GLUCOSE SENSOR
KIT MISCELLANEOUS
Qty: 2 EACH | Refills: 1 | Status: SHIPPED | OUTPATIENT
Start: 2019-09-26 | End: 2019-12-01

## 2019-09-28 ENCOUNTER — OFFICE VISIT (OUTPATIENT)
Dept: INTERNAL MEDICINE CLINIC | Facility: CLINIC | Age: 54
End: 2019-09-28
Payer: COMMERCIAL

## 2019-09-28 VITALS
HEIGHT: 65.5 IN | BODY MASS INDEX: 37.69 KG/M2 | TEMPERATURE: 98 F | RESPIRATION RATE: 16 BRPM | SYSTOLIC BLOOD PRESSURE: 132 MMHG | WEIGHT: 229 LBS | HEART RATE: 68 BPM | DIASTOLIC BLOOD PRESSURE: 74 MMHG | OXYGEN SATURATION: 98 %

## 2019-09-28 DIAGNOSIS — Z00.00 LABORATORY EXAMINATION ORDERED AS PART OF A ROUTINE GENERAL MEDICAL EXAMINATION: ICD-10-CM

## 2019-09-28 DIAGNOSIS — Z12.31 ENCOUNTER FOR SCREENING MAMMOGRAM FOR MALIGNANT NEOPLASM OF BREAST: ICD-10-CM

## 2019-09-28 DIAGNOSIS — Z00.00 ROUTINE GENERAL MEDICAL EXAMINATION AT A HEALTH CARE FACILITY: ICD-10-CM

## 2019-09-28 DIAGNOSIS — Z01.419 ENCOUNTER FOR GYNECOLOGICAL EXAMINATION WITH PAPANICOLAOU SMEAR OF CERVIX: Primary | ICD-10-CM

## 2019-09-28 LAB
ALBUMIN SERPL-MCNC: 4 G/DL (ref 3.4–5)
ALBUMIN/GLOB SERPL: 1 {RATIO} (ref 1–2)
ALP LIVER SERPL-CCNC: 116 U/L (ref 41–108)
ALT SERPL-CCNC: 34 U/L (ref 13–56)
ANION GAP SERPL CALC-SCNC: 7 MMOL/L (ref 0–18)
AST SERPL-CCNC: 19 U/L (ref 15–37)
BASOPHILS # BLD AUTO: 0.06 X10(3) UL (ref 0–0.2)
BASOPHILS NFR BLD AUTO: 0.6 %
BILIRUB SERPL-MCNC: 1 MG/DL (ref 0.1–2)
BUN BLD-MCNC: 19 MG/DL (ref 7–18)
BUN/CREAT SERPL: 19.8 (ref 10–20)
CALCIUM BLD-MCNC: 8.6 MG/DL (ref 8.5–10.1)
CHLORIDE SERPL-SCNC: 104 MMOL/L (ref 98–112)
CHOLEST SMN-MCNC: 131 MG/DL (ref ?–200)
CO2 SERPL-SCNC: 27 MMOL/L (ref 21–32)
CREAT BLD-MCNC: 0.96 MG/DL (ref 0.55–1.02)
DEPRECATED RDW RBC AUTO: 38.8 FL (ref 35.1–46.3)
EOSINOPHIL # BLD AUTO: 0.17 X10(3) UL (ref 0–0.7)
EOSINOPHIL NFR BLD AUTO: 1.7 %
ERYTHROCYTE [DISTWIDTH] IN BLOOD BY AUTOMATED COUNT: 11.9 % (ref 11–15)
GLOBULIN PLAS-MCNC: 3.9 G/DL (ref 2.8–4.4)
GLUCOSE BLD-MCNC: 129 MG/DL (ref 70–99)
HCT VFR BLD AUTO: 48.3 % (ref 35–48)
HDLC SERPL-MCNC: 44 MG/DL (ref 40–59)
HGB BLD-MCNC: 15.9 G/DL (ref 12–16)
IMM GRANULOCYTES # BLD AUTO: 0.02 X10(3) UL (ref 0–1)
IMM GRANULOCYTES NFR BLD: 0.2 %
LDLC SERPL CALC-MCNC: 66 MG/DL (ref ?–100)
LYMPHOCYTES # BLD AUTO: 2.67 X10(3) UL (ref 1–4)
LYMPHOCYTES NFR BLD AUTO: 27.4 %
M PROTEIN MFR SERPL ELPH: 7.9 G/DL (ref 6.4–8.2)
MCH RBC QN AUTO: 29.6 PG (ref 26–34)
MCHC RBC AUTO-ENTMCNC: 32.9 G/DL (ref 31–37)
MCV RBC AUTO: 89.8 FL (ref 80–100)
MONOCYTES # BLD AUTO: 0.46 X10(3) UL (ref 0.1–1)
MONOCYTES NFR BLD AUTO: 4.7 %
NEUTROPHILS # BLD AUTO: 6.37 X10 (3) UL (ref 1.5–7.7)
NEUTROPHILS # BLD AUTO: 6.37 X10(3) UL (ref 1.5–7.7)
NEUTROPHILS NFR BLD AUTO: 65.4 %
NONHDLC SERPL-MCNC: 87 MG/DL (ref ?–130)
OSMOLALITY SERPL CALC.SUM OF ELEC: 290 MOSM/KG (ref 275–295)
PLATELET # BLD AUTO: 289 10(3)UL (ref 150–450)
POTASSIUM SERPL-SCNC: 3.8 MMOL/L (ref 3.5–5.1)
RBC # BLD AUTO: 5.38 X10(6)UL (ref 3.8–5.3)
SODIUM SERPL-SCNC: 138 MMOL/L (ref 136–145)
TRIGL SERPL-MCNC: 105 MG/DL (ref 30–149)
TSI SER-ACNC: 1.77 MIU/ML (ref 0.36–3.74)
VIT D+METAB SERPL-MCNC: 28 NG/ML (ref 30–100)
VLDLC SERPL CALC-MCNC: 21 MG/DL (ref 0–30)
WBC # BLD AUTO: 9.8 X10(3) UL (ref 4–11)

## 2019-09-28 PROCEDURE — 85025 COMPLETE CBC W/AUTO DIFF WBC: CPT | Performed by: FAMILY MEDICINE

## 2019-09-28 PROCEDURE — 82306 VITAMIN D 25 HYDROXY: CPT | Performed by: FAMILY MEDICINE

## 2019-09-28 PROCEDURE — 87624 HPV HI-RISK TYP POOLED RSLT: CPT | Performed by: FAMILY MEDICINE

## 2019-09-28 PROCEDURE — 88175 CYTOPATH C/V AUTO FLUID REDO: CPT | Performed by: FAMILY MEDICINE

## 2019-09-28 PROCEDURE — 84443 ASSAY THYROID STIM HORMONE: CPT | Performed by: FAMILY MEDICINE

## 2019-09-28 PROCEDURE — 99396 PREV VISIT EST AGE 40-64: CPT | Performed by: FAMILY MEDICINE

## 2019-09-28 PROCEDURE — 80053 COMPREHEN METABOLIC PANEL: CPT | Performed by: FAMILY MEDICINE

## 2019-09-28 PROCEDURE — 80061 LIPID PANEL: CPT | Performed by: FAMILY MEDICINE

## 2019-09-28 RX ORDER — ALPRAZOLAM 0.5 MG/1
TABLET ORAL
Qty: 30 TABLET | Refills: 0 | Status: SHIPPED | OUTPATIENT
Start: 2019-09-28 | End: 2019-12-30

## 2019-09-28 NOTE — PROGRESS NOTES
HPI:   Esme Sweeney is a 48year old female who presents for a complete physical exam. Symptoms: denies discharge, itching, burning or dysuria, last menses 6 months ago. Patient complains of occ elbow pain on and off on the left.  Discussed seeing María Bah Results   Component Value Date    CHOLEST 136 03/09/2019    CHOLEST 176 11/17/2018    CHOLEST 156 09/22/2018     Lab Results   Component Value Date    HDL 38 (L) 03/09/2019    HDL 31 (L) 11/17/2018    HDL 38 (L) 09/22/2018     Lab Results   Component Value Misc Test 2 x daily Disp: 1 Box Rfl: 11   Glucose Blood (ONETOUCH VERIO) In Vitro Strip Test 2 times daily Disp: 200 strip Rfl: 1   Albuterol Sulfate HFA (PROAIR HFA) 108 (90 BASE) MCG/ACT Inhalation Aero Soln Inhale 2 puffs into the lungs every 4 (four) h MUSCULOSKELETAL: + back pain, left elbow pain on and off.   NEURO: denies headaches  PSYCHE:+ anxiety  HEMATOLOGIC: denies hx of anemia  ENDOCRINE:  thyroid history,+DM  ALL/ASTHMA: denies hx of allergy or asthma    EXAM:   /74 (BP Location: Left ar for CPX in one year.   Encounter for gynecological examination with papanicolaou smear of cervix  (primary encounter diagnosis)  Routine general medical examination at a health care facility  Laboratory examination ordered as part of a routine general medic

## 2019-09-30 ENCOUNTER — TELEPHONE (OUTPATIENT)
Dept: INTERNAL MEDICINE CLINIC | Facility: CLINIC | Age: 54
End: 2019-09-30

## 2019-10-01 ENCOUNTER — OFFICE VISIT (OUTPATIENT)
Dept: ENDOCRINOLOGY CLINIC | Facility: CLINIC | Age: 54
End: 2019-10-01
Payer: COMMERCIAL

## 2019-10-01 VITALS
HEIGHT: 65.5 IN | WEIGHT: 235 LBS | DIASTOLIC BLOOD PRESSURE: 60 MMHG | BODY MASS INDEX: 38.68 KG/M2 | HEART RATE: 68 BPM | SYSTOLIC BLOOD PRESSURE: 96 MMHG

## 2019-10-01 DIAGNOSIS — E11.65 TYPE 2 DIABETES, UNCONTROLLED, WITH NEUROPATHY (HCC): Primary | ICD-10-CM

## 2019-10-01 DIAGNOSIS — E11.40 TYPE 2 DIABETES, UNCONTROLLED, WITH NEUROPATHY (HCC): Primary | ICD-10-CM

## 2019-10-01 PROCEDURE — 95251 CONT GLUC MNTR ANALYSIS I&R: CPT | Performed by: NURSE PRACTITIONER

## 2019-10-01 PROCEDURE — 83036 HEMOGLOBIN GLYCOSYLATED A1C: CPT | Performed by: NURSE PRACTITIONER

## 2019-10-01 PROCEDURE — 99214 OFFICE O/P EST MOD 30 MIN: CPT | Performed by: NURSE PRACTITIONER

## 2019-10-01 NOTE — PROGRESS NOTES
Patient presents with:  Diabetes: room-16 cf. pt did bring personal bridger. HPI:   Tan Baez is a 48year old female presenting with type 2  DM medication management.  In the past 2m, DM control has improved to 7.2%  ( last A1C 8.3%)       Continues to we bridger CGM    DM associated symptoms:   Polyuria, polyphagia, polydipsia: no  Paresthesias: yes, LE   Blurred vision: no  Hypoglycemia: no    DM Complications:  Microvascular:   Neuropathy: yes  Retinopathy: no  Nephropathy: no    Macrovascular:  PVD: no  C tablet Rfl: 0   FREESTYLE NANETTE 14 DAY SENSOR Does not apply Misc APPLY 1 SENSOR TO SKIN EVERY 14 DAYS Disp: 2 each Rfl: 1   Semaglutide, 1 MG/DOSE, (OZEMPIC, 1 MG/DOSE,) 2 MG/1.5ML Subcutaneous Solution Pen-injector Inject 1 mg as directed once a week.  Shell Yoon Musculoskeletal: Negative for joint pain. Skin: Negative for rash. Neurological: Positive for numbness. LE foot    Hematological:        No hypoglycemia   Psychiatric/Behavioral: Negative for sleep disturbance.        Physical exam:  BP 96/60 exp-12/21      DM Quality Indicators:  A1C as reported above  Retinopathy Screen: Due 12/22/2018 no hx DR   Nephropathy Screen: 8-2019  NEG  Continue ACEi  Depression Screen:  PHQ-2 3- NEG    Feet exam: Reviewed feet precautions 3- foot exam

## 2019-10-01 NOTE — PATIENT INSTRUCTIONS
Good job on a1C reduction to 7.2%     Continue working on lifestyle changes ~    Continue  Ozempic  1mg once weekly   Jardiance 25mg once daily  Glipizide XL 10mg twice daily     American Diabetes Blood sugar targets:     Fasting blood sugar (before breakf

## 2019-10-21 RX ORDER — SEMAGLUTIDE 1.34 MG/ML
INJECTION, SOLUTION SUBCUTANEOUS
Qty: 3 ML | Refills: 2 | Status: SHIPPED | OUTPATIENT
Start: 2019-10-21 | End: 2020-02-05

## 2019-10-28 DIAGNOSIS — I10 HYPERTENSION, ESSENTIAL, BENIGN: ICD-10-CM

## 2019-10-28 RX ORDER — LISINOPRIL AND HYDROCHLOROTHIAZIDE 20; 12.5 MG/1; MG/1
TABLET ORAL
Qty: 90 TABLET | Refills: 0 | Status: SHIPPED | OUTPATIENT
Start: 2019-10-28 | End: 2020-01-30

## 2019-11-11 ENCOUNTER — OFFICE VISIT (OUTPATIENT)
Dept: INTERNAL MEDICINE CLINIC | Facility: CLINIC | Age: 54
End: 2019-11-11
Payer: COMMERCIAL

## 2019-11-11 VITALS
WEIGHT: 233 LBS | HEIGHT: 65.5 IN | RESPIRATION RATE: 16 BRPM | OXYGEN SATURATION: 98 % | HEART RATE: 70 BPM | TEMPERATURE: 99 F | SYSTOLIC BLOOD PRESSURE: 120 MMHG | DIASTOLIC BLOOD PRESSURE: 68 MMHG | BODY MASS INDEX: 38.35 KG/M2

## 2019-11-11 DIAGNOSIS — J01.00 ACUTE MAXILLARY SINUSITIS, RECURRENCE NOT SPECIFIED: Primary | ICD-10-CM

## 2019-11-11 PROCEDURE — 99213 OFFICE O/P EST LOW 20 MIN: CPT | Performed by: FAMILY MEDICINE

## 2019-11-11 RX ORDER — CEFDINIR 300 MG/1
300 CAPSULE ORAL 2 TIMES DAILY
Qty: 20 CAPSULE | Refills: 0 | Status: SHIPPED | OUTPATIENT
Start: 2019-11-11 | End: 2020-05-08 | Stop reason: ALTCHOICE

## 2019-11-11 RX ORDER — FLUTICASONE PROPIONATE 50 MCG
2 SPRAY, SUSPENSION (ML) NASAL DAILY
Qty: 1 BOTTLE | Refills: 1 | Status: SHIPPED | OUTPATIENT
Start: 2019-11-11 | End: 2019-12-03

## 2019-11-11 RX ORDER — HYDROCODONE BITARTRATE AND ACETAMINOPHEN 5; 325 MG/1; MG/1
1 TABLET ORAL EVERY 6 HOURS PRN
Qty: 10 TABLET | Refills: 0 | Status: SHIPPED | OUTPATIENT
Start: 2019-11-11 | End: 2020-06-17

## 2019-11-11 RX ORDER — PREDNISONE 20 MG/1
TABLET ORAL
Qty: 14 TABLET | Refills: 0 | Status: SHIPPED | OUTPATIENT
Start: 2019-11-11 | End: 2020-05-08 | Stop reason: ALTCHOICE

## 2019-11-11 NOTE — PROGRESS NOTES
CHIEF COMPLAINT:   Patient presents with:  Sinusitis: started over a week ago. Pt states she is having a lot of sinus pressure. Pt states she has a lot of pressure behind of her eyes, teeth hurt/ mouth hurts. OTC tylenol and advil.       HPI:   Isabelle Muir 1 tablet (50 mg total) by mouth once daily. 90 tablet 0   • Insulin Pen Needle (PEN NEEDLES) 32G X 4 MM Does not apply Misc 1 each by Does not apply route every 7 days.  30 each 0   • ONETOUCH DELICA LANCETS 11Z Does not apply Misc Test 2 x daily 1 Box 11 normocephalic.  + tenderness on palpation of maxillary or frontal sinuses  EYES: conjunctiva clear, EOM intact  EARS: TM's left dull, right clear, no bulging, no retraction,+ fluid on the left  NOSE: Nostrils patent, mucoid nasal discharge, nasal mucosa re

## 2019-12-01 RX ORDER — FLASH GLUCOSE SENSOR
KIT MISCELLANEOUS
Qty: 2 EACH | Refills: 2 | Status: SHIPPED | OUTPATIENT
Start: 2019-12-01 | End: 2020-06-17

## 2019-12-03 RX ORDER — FLUTICASONE PROPIONATE 50 MCG
SPRAY, SUSPENSION (ML) NASAL
Qty: 1 BOTTLE | Refills: 1 | Status: SHIPPED | OUTPATIENT
Start: 2019-12-03 | End: 2019-12-09

## 2019-12-03 NOTE — TELEPHONE ENCOUNTER
Fluticasone propionate 50 MCG  Last OV relevant to medication: 11-11-19  Last refill date: 11-11-19  #/refills: 1  When pt was asked to return for OV: none  Upcoming appt/reason: none  Recent labs: none

## 2019-12-05 DIAGNOSIS — F41.9 ANXIETY: ICD-10-CM

## 2019-12-07 ENCOUNTER — HOSPITAL ENCOUNTER (OUTPATIENT)
Dept: MAMMOGRAPHY | Age: 54
Discharge: HOME | End: 2019-12-07
Attending: FAMILY MEDICINE
Payer: COMMERCIAL

## 2019-12-07 DIAGNOSIS — Z12.31 ENCOUNTER FOR SCREENING MAMMOGRAM FOR MALIGNANT NEOPLASM OF BREAST: ICD-10-CM

## 2019-12-07 PROCEDURE — 77063 BREAST TOMOSYNTHESIS BI: CPT | Performed by: FAMILY MEDICINE

## 2019-12-07 PROCEDURE — 77067 SCR MAMMO BI INCL CAD: CPT | Performed by: FAMILY MEDICINE

## 2019-12-09 ENCOUNTER — TELEPHONE (OUTPATIENT)
Dept: INTERNAL MEDICINE CLINIC | Facility: CLINIC | Age: 54
End: 2019-12-09

## 2019-12-09 RX ORDER — FLUTICASONE PROPIONATE 50 MCG
SPRAY, SUSPENSION (ML) NASAL
Qty: 1 BOTTLE | Refills: 1 | Status: SHIPPED | OUTPATIENT
Start: 2019-12-09 | End: 2019-12-11

## 2019-12-09 NOTE — TELEPHONE ENCOUNTER
Fluticasone propionate 50 MCG  Last OV relevant to medication: 11-11-19  Last refill date: 12-3-19 #/refills: 1  When pt was asked to return for OV: none  Upcoming appt/reason: none  Recent labs: none

## 2019-12-11 ENCOUNTER — TELEPHONE (OUTPATIENT)
Dept: INTERNAL MEDICINE CLINIC | Facility: CLINIC | Age: 54
End: 2019-12-11

## 2019-12-11 DIAGNOSIS — E03.9 HYPOTHYROIDISM (ACQUIRED): ICD-10-CM

## 2019-12-11 RX ORDER — LEVOTHYROXINE SODIUM 137 UG/1
TABLET ORAL
Qty: 90 TABLET | Refills: 1 | Status: SHIPPED | OUTPATIENT
Start: 2019-12-11 | End: 2020-10-28

## 2019-12-11 RX ORDER — FLUTICASONE PROPIONATE 50 MCG
SPRAY, SUSPENSION (ML) NASAL
Qty: 3 BOTTLE | Refills: 0 | Status: SHIPPED | OUTPATIENT
Start: 2019-12-11 | End: 2020-06-17

## 2019-12-11 NOTE — TELEPHONE ENCOUNTER
Levothyroxine 137 mcg 1 tab daily filed 9-3-19 30 with 3 refills      LOV 9-28-19   return for CPX in one year  No upcoming apt on file   Labs 9-28-19

## 2019-12-30 RX ORDER — ALPRAZOLAM 0.5 MG/1
TABLET ORAL
Qty: 30 TABLET | Refills: 0 | Status: SHIPPED | OUTPATIENT
Start: 2019-12-30 | End: 2020-04-06

## 2019-12-30 NOTE — TELEPHONE ENCOUNTER
Alprazolam 0.5 MG  Last OV relevant to medication: 9-28-19  Last refill date: 9-28-19 #/refills: 0  When pt was asked to return for OV: CPX 1yr  Upcoming appt/reason: none  Recent labs: none

## 2020-01-03 ENCOUNTER — TELEPHONE (OUTPATIENT)
Dept: INTERNAL MEDICINE CLINIC | Facility: CLINIC | Age: 55
End: 2020-01-03

## 2020-01-30 DIAGNOSIS — I10 HYPERTENSION, ESSENTIAL, BENIGN: ICD-10-CM

## 2020-01-30 RX ORDER — LISINOPRIL AND HYDROCHLOROTHIAZIDE 20; 12.5 MG/1; MG/1
TABLET ORAL
Qty: 90 TABLET | Refills: 0 | Status: SHIPPED | OUTPATIENT
Start: 2020-01-30 | End: 2020-04-30

## 2020-02-05 ENCOUNTER — PATIENT MESSAGE (OUTPATIENT)
Dept: ENDOCRINOLOGY CLINIC | Facility: CLINIC | Age: 55
End: 2020-02-05

## 2020-02-05 NOTE — TELEPHONE ENCOUNTER
I do not see a rejection for Ozempic - last refill rx was 10-   [id number] Ani Hood to refill and recommend appt

## 2020-02-05 NOTE — TELEPHONE ENCOUNTER
Please see pt's MyChart message from today: we declined refill of her Ozempic because? She last saw you 10/1/19 and was due to see you in 3 months. Okay to respond to pt she needs to schedule a f/u appt? Please advise.

## 2020-02-27 DIAGNOSIS — F41.9 ANXIETY: ICD-10-CM

## 2020-02-27 DIAGNOSIS — E11.40 TYPE 2 DIABETES, UNCONTROLLED, WITH NEUROPATHY (HCC): ICD-10-CM

## 2020-02-27 DIAGNOSIS — E11.65 TYPE 2 DIABETES, UNCONTROLLED, WITH NEUROPATHY (HCC): ICD-10-CM

## 2020-02-27 RX ORDER — EMPAGLIFLOZIN 25 MG/1
TABLET, FILM COATED ORAL
Qty: 90 TABLET | Refills: 1 | Status: SHIPPED | OUTPATIENT
Start: 2020-02-27 | End: 2020-09-03

## 2020-02-27 NOTE — TELEPHONE ENCOUNTER
Sertraline HCL 50 mg  Last OV relevant to medication: 9-28-19  Last refill date: 12-5-19  #/refills: 0  When pt was asked to return for OV: CPX 1yr  Upcoming appt/reason: none  Recent labs: none

## 2020-03-05 DIAGNOSIS — E55.9 VITAMIN D DEFICIENCY: ICD-10-CM

## 2020-03-05 RX ORDER — ERGOCALCIFEROL 1.25 MG/1
CAPSULE ORAL
Qty: 12 CAPSULE | Refills: 1 | OUTPATIENT
Start: 2020-03-05

## 2020-03-05 RX ORDER — GLIPIZIDE 10 MG/1
TABLET, FILM COATED, EXTENDED RELEASE ORAL
Qty: 180 TABLET | Refills: 1 | Status: SHIPPED | OUTPATIENT
Start: 2020-03-05 | End: 2020-04-15

## 2020-03-05 NOTE — TELEPHONE ENCOUNTER
Pt needs to recheck Vit. D levels after 4-1-2020 Pt to use OTC 2000 units     Ergocalciferol 42162 units  Last OV relevant to medication: 9-28-19  Last refill date: 10-1-19 #/refills: 1  When pt was asked to return for OV: CPX 1yr  Upcoming appt/reason: non

## 2020-03-06 ENCOUNTER — PATIENT MESSAGE (OUTPATIENT)
Dept: ENDOCRINOLOGY CLINIC | Facility: CLINIC | Age: 55
End: 2020-03-06

## 2020-03-06 NOTE — TELEPHONE ENCOUNTER
From: Sagar Gomez  To: FELY Bowen  Sent: 3/6/2020 1:07 PM CST  Subject: Prescription Question    The pharmacy said that my Ozempic was denied?

## 2020-03-16 RX ORDER — FLASH GLUCOSE SENSOR
KIT MISCELLANEOUS
Qty: 6 EACH | Refills: 2 | Status: CANCELLED | OUTPATIENT
Start: 2020-03-16

## 2020-03-16 NOTE — TELEPHONE ENCOUNTER
Called pt. Spoke to her she stated she is not home at the moment that she will send us a [place] message to let us know what mail order pharmacy she will prefer. She also states she needs a PA. awaiting for mail order info.

## 2020-03-24 ENCOUNTER — PATIENT MESSAGE (OUTPATIENT)
Dept: ENDOCRINOLOGY CLINIC | Facility: CLINIC | Age: 55
End: 2020-03-24

## 2020-03-25 NOTE — TELEPHONE ENCOUNTER
From: Neetu Anne  To: FELY Gurrola  Sent: 3/24/2020 6:23 PM CDT  Subject: Prescription Question    Hi hope you are well and safe. My sensor fell off, here is a picture of what is happening. What are your thoughts?

## 2020-03-25 NOTE — TELEPHONE ENCOUNTER
Patient's skin looks dry, make sure she is moisturizing skin well. It could be one reason that her skin is so irritated with adhesive from sensor. She can also purchase a skin barrier product to help prevent irritation.

## 2020-03-30 RX ORDER — ATORVASTATIN CALCIUM 20 MG/1
TABLET, FILM COATED ORAL
Qty: 30 TABLET | Refills: 0 | Status: SHIPPED | OUTPATIENT
Start: 2020-03-30 | End: 2020-04-29

## 2020-04-06 ENCOUNTER — TELEPHONE (OUTPATIENT)
Dept: ENDOCRINOLOGY CLINIC | Facility: CLINIC | Age: 55
End: 2020-04-06

## 2020-04-06 RX ORDER — ALPRAZOLAM 0.5 MG/1
0.5 TABLET ORAL NIGHTLY PRN
Qty: 30 TABLET | Refills: 0 | Status: SHIPPED | OUTPATIENT
Start: 2020-04-06 | End: 2020-05-24

## 2020-04-06 NOTE — TELEPHONE ENCOUNTER
Called pt to ler her know that her appt for Saturday April 18 would need to be reschedule as we are not seeing pt in office did let her know we are doing phone visits that if she is interested she can give us a call to schedule it.  Sent her a curt morales

## 2020-04-09 ENCOUNTER — VIRTUAL PHONE E/M (OUTPATIENT)
Dept: ENDOCRINOLOGY CLINIC | Facility: CLINIC | Age: 55
End: 2020-04-09
Payer: COMMERCIAL

## 2020-04-09 ENCOUNTER — TELEPHONE (OUTPATIENT)
Dept: ENDOCRINOLOGY CLINIC | Facility: CLINIC | Age: 55
End: 2020-04-09

## 2020-04-09 DIAGNOSIS — E11.65 TYPE 2 DIABETES, UNCONTROLLED, WITH NEUROPATHY (HCC): Primary | ICD-10-CM

## 2020-04-09 DIAGNOSIS — E11.40 TYPE 2 DIABETES, UNCONTROLLED, WITH NEUROPATHY (HCC): Primary | ICD-10-CM

## 2020-04-09 PROCEDURE — 99213 OFFICE O/P EST LOW 20 MIN: CPT | Performed by: NURSE PRACTITIONER

## 2020-04-09 PROCEDURE — 95251 CONT GLUC MNTR ANALYSIS I&R: CPT | Performed by: NURSE PRACTITIONER

## 2020-04-09 RX ORDER — BLOOD-GLUCOSE SENSOR
1 EACH MISCELLANEOUS
Qty: 3 EACH | Refills: 12 | Status: SHIPPED | OUTPATIENT
Start: 2020-04-09 | End: 2020-06-17

## 2020-04-09 RX ORDER — BLOOD-GLUCOSE TRANSMITTER
EACH MISCELLANEOUS
Qty: 1 EACH | Refills: 3 | Status: SHIPPED | OUTPATIENT
Start: 2020-04-09 | End: 2020-06-17

## 2020-04-09 NOTE — PROGRESS NOTES
Virtual Telephone Check-In    Charlie Lehman verbally consents to a Virtual/Telephone Check-In visit on 04/09/20. Patient understands and accepts financial responsibility for any deductible, co-insurance and/or co-pays associated with this service. Component Value Date    CHOLEST 131 09/28/2019    TRIG 105 09/28/2019    HDL 44 09/28/2019    LDL 66 09/28/2019    MICROALBCREA 7.5 08/08/2019    CREATSERUM 0.96 09/28/2019    GFRNAA 68 09/28/2019    GFRAA 79 09/28/2019       Modifying factors:  Fab Henao face-to-face examination or emergency care. Patient advised to go to ER or call 911 for worsening symptoms or acute distress. Please note that the following visit was completed using two-way, real-time interactive audio and/or video communication.   Radha Ball

## 2020-04-09 NOTE — TELEPHONE ENCOUNTER
dexcom is preferred on insurance (paying perry for Eusebio Solano) desires to pursue dexcom w insurance  rx sent to [employer] [facility]

## 2020-04-13 NOTE — TELEPHONE ENCOUNTER
Radha Gaston to check on the status of pt.'s Dexcom order. Pharmacist states that it isn't covered under pharmacy benefits but might be covered under medical benefits. Krishna states he forwarded it to [employer] check w/A.  Racquel via e

## 2020-04-14 ENCOUNTER — PATIENT MESSAGE (OUTPATIENT)
Dept: ENDOCRINOLOGY CLINIC | Facility: CLINIC | Age: 55
End: 2020-04-14

## 2020-04-14 NOTE — TELEPHONE ENCOUNTER
Attempted to reach pt. but it went to voicemail. Message left but will try home phone. Home phone went to voicemail. Sent [employer] message & she returned the call from work . Pt. states she is using the [employer], doesn't have her meter w/her at work.  States s

## 2020-04-14 NOTE — TELEPHONE ENCOUNTER
Called pt. back after 15 min @ work ph. [phone] ext [phone]. States she is starting to feel better but still reading LO. She said she may have someone drive her home which is 6 min away to get her meter.  State she will call me back to let me know what her

## 2020-04-14 NOTE — TELEPHONE ENCOUNTER
From: Remington Lam  To: FELY Cazares  Sent: 4/14/2020 11:23 AM CDT  Subject: Visit Follow-up Question    SO I just checked my sugar it was 43, I checked again and it came bacl LO out of range.

## 2020-04-14 NOTE — TELEPHONE ENCOUNTER
Pt. returned call to report her BG was 158 mg/dL w/ her blood glucose meter. Pt. Will try to remember to bring her meter to work.

## 2020-04-15 RX ORDER — GLIPIZIDE 10 MG/1
TABLET, FILM COATED, EXTENDED RELEASE ORAL
Qty: 90 TABLET | Refills: 1 | OUTPATIENT
Start: 2020-04-15 | End: 2020-09-03

## 2020-04-15 NOTE — TELEPHONE ENCOUNTER
Contacted pt. & she states that when she got home from work , her Blackman sensor showed an error had occurred. They will be replacing the sensor but is waiting for her Dexcom to get covered by insurance. Will follow-up on status of her order.  Pt. V/u & was a

## 2020-04-22 NOTE — TELEPHONE ENCOUNTER
Filled out paperwork from [employer]. Per paperwork states that pt needs to be testing 4 times a day and 3 or more insulin shots a day. CAB is aware that pt is not so we can not assure that it charisse be approve for dexcom.  Faxed over confirmation sheet was receive

## 2020-04-22 NOTE — TELEPHONE ENCOUNTER
CMN and paperwor request received today from [facility] via [employer] - Emailed to Crow Rodriguez for completion.

## 2020-04-27 NOTE — TELEPHONE ENCOUNTER
Sent another e-mail to AAMIR Clement to check on status of G6.  Received a reply stating her supplies will come from [facility].  Sent e-mail to provider [email or web address]

## 2020-04-29 RX ORDER — ATORVASTATIN CALCIUM 20 MG/1
TABLET, FILM COATED ORAL
Qty: 30 TABLET | Refills: 0 | Status: SHIPPED | OUTPATIENT
Start: 2020-04-29 | End: 2020-07-20

## 2020-04-30 DIAGNOSIS — I10 HYPERTENSION, ESSENTIAL, BENIGN: ICD-10-CM

## 2020-04-30 RX ORDER — LISINOPRIL AND HYDROCHLOROTHIAZIDE 20; 12.5 MG/1; MG/1
TABLET ORAL
Qty: 90 TABLET | Refills: 0 | Status: SHIPPED | OUTPATIENT
Start: 2020-04-30 | End: 2020-07-27

## 2020-05-04 ENCOUNTER — PATIENT MESSAGE (OUTPATIENT)
Dept: INTERNAL MEDICINE CLINIC | Facility: CLINIC | Age: 55
End: 2020-05-04

## 2020-05-04 NOTE — TELEPHONE ENCOUNTER
Appointment scheduled    Future Appointments   Date Time Provider Minerva Garnica   5/5/2020  9:00 AM Hulen Lundborg R, APRN [facility] [place]

## 2020-05-04 NOTE — TELEPHONE ENCOUNTER
From: Remington Lam  To: FELY Jaime  Sent: 5/4/2020 9:44 AM CDT  Subject: Prescription Question    Good Morning Estella,     I have been having lower back pain for a little over a week now.  Tylenol, and Advil are not helping, right now I have a h

## 2020-05-05 ENCOUNTER — VIRTUAL PHONE E/M (OUTPATIENT)
Dept: INTERNAL MEDICINE CLINIC | Facility: CLINIC | Age: 55
End: 2020-05-05
Payer: COMMERCIAL

## 2020-05-05 DIAGNOSIS — M54.50 ACUTE MIDLINE LOW BACK PAIN WITHOUT SCIATICA: Primary | ICD-10-CM

## 2020-05-05 PROCEDURE — 99213 OFFICE O/P EST LOW 20 MIN: CPT | Performed by: NURSE PRACTITIONER

## 2020-05-05 RX ORDER — CYCLOBENZAPRINE HCL 10 MG
10 TABLET ORAL 3 TIMES DAILY
Qty: 30 TABLET | Refills: 0 | Status: SHIPPED | OUTPATIENT
Start: 2020-05-05 | End: 2020-06-17

## 2020-05-05 NOTE — PROGRESS NOTES
Virtual Telephone Check-In    Esme Sweeney verbally consents to a Virtual/Telephone Check-In visit on 05/05/20. Patient verified identification with name and date of birth.      Patient understands and accepts financial responsibility for any deductibl JARDIANCE 25 MG Oral Tab TAKE 1 TABLET BY MOUTH EVERY DAY 90 tablet 1   • SERTRALINE HCL 50 MG Oral Tab TAKE 1 TABLET BY MOUTH EVERY DAY 90 tablet 0   • LEVOTHYROXINE SODIUM 137 MCG Oral Tab TAKE 1 TABLET BY MOUTH EVERY DAY BEFORE BREAKFAST 90 tablet 1   • Frequency: Monthly or less      Drinks per session: 1 or 2      Binge frequency: Never      Comment: rarely    Drug use: No       REVIEW OF SYSTEMS:   GENERAL: feels well otherwise  SKIN: denies any unusual skin lesions  LUNGS: denies shortness of breath

## 2020-05-05 NOTE — PATIENT INSTRUCTIONS
Back Pain Exercises   Exercises that stretch and strengthen the muscles of your abdomen and spine can help prevent back problems. If your back and abdominal muscles are strong, you can maintain good posture and keep your spine in its correct position.    If Pelvic tilt: Lie on your back with your knees bent and your feet flat on the floor. Tighten your abdominal muscles and push your lower back into the floor. Hold this position for 5 seconds, then relax. Do 3 sets of 10.    Partial curl: Lie on your back with It is best to avoid the following exercises because they strain the lower back:   legs raised straight and together   sit-ups with legs straight   hip twists   toe touches   any backward arching.    Sports and other activities   In addition to conditioning · During the first 24 to 72 hours after an acute injury or flare-up of chronic back pain, put an ice pack on the painful area for 20 minutes and then remove it for 20 minutes. Do thisover a period of 60 to 90 minutes, or several times a day.  As a safety pr · Straighten your legs to lift the object.   · Lower the object to the floor in the reverse fashion. · If you must slide something across the floor, push it. Posture tips  Sitting  Sit in chairs with straight backs or low-back support.  Keep your knees © 3573-0527 [employer] [address] [facility] [place] [facility] [place]. All rights reserved. This information is not intended as a substitute for professional medical care. Always follow your healthcare professional's instructions.

## 2020-05-08 ENCOUNTER — HOSPITAL ENCOUNTER (OUTPATIENT)
Age: 55
Discharge: HOME | End: 2020-05-08
Attending: FAMILY MEDICINE
Payer: COMMERCIAL

## 2020-05-08 VITALS
DIASTOLIC BLOOD PRESSURE: 76 MMHG | TEMPERATURE: 99 F | HEART RATE: 76 BPM | SYSTOLIC BLOOD PRESSURE: 131 MMHG | OXYGEN SATURATION: 96 % | WEIGHT: 240 LBS | BODY MASS INDEX: 39 KG/M2 | RESPIRATION RATE: 16 BRPM

## 2020-05-08 DIAGNOSIS — K04.7 DENTAL ABSCESS: Primary | ICD-10-CM

## 2020-05-08 PROCEDURE — 99213 OFFICE O/P EST LOW 20 MIN: CPT

## 2020-05-08 PROCEDURE — 99204 OFFICE O/P NEW MOD 45 MIN: CPT

## 2020-05-08 RX ORDER — AMOXICILLIN AND CLAVULANATE POTASSIUM 875; 125 MG/1; MG/1
875 TABLET, FILM COATED ORAL 2 TIMES DAILY
Qty: 20 TABLET | Refills: 0 | Status: SHIPPED | OUTPATIENT
Start: 2020-05-08 | End: 2020-06-17

## 2020-05-08 NOTE — ED PROVIDER NOTES
Patient Seen in: [facility] [place]      History   Patient presents with:  Dental Problem  Jaw Pain    Stated Complaint: jaw infection, x2days     HPI    This 68-year-old female with history for hypertension and diabetes presents the of 131/76   Pulse 76   Resp 16   Temp 98.9 °F (37.2 °C)   Temp src Oral   SpO2 96 %   O2 Device None (Room air)       Current:/76   Pulse 76   Temp 98.9 °F (37.2 °C) (Oral)   Resp 16   Wt 108.9 kg   LMP 05/04/2020 (Exact Date)   SpO2 96%   BMI 39.33 kg/ the good bacteria in your intestines you do not develop diarrhea. You may take ibuprofen 600 mg every 6 hours with food as needed for pain. Apply ice to help decrease the swelling to your face.   Gargle with mouthwash or salt water to help decrease the ge

## 2020-05-11 NOTE — TELEPHONE ENCOUNTER
Received fax from [employer] stating that referral is being process as a prior Katie Oiler will send another fax with updates.

## 2020-05-12 NOTE — TELEPHONE ENCOUNTER
Semaglutide, 1 MG/DOSE, (OZEMPIC, 1 MG/DOSE,) 2 MG/1.5ML Subcutaneous Solution Pen-injector 4.5 mL 0 4/6/2020     Sig - Route: Inject 1 mg into the skin once a week.  - Subcutaneous    Sent to pharmacy as: Semaglutide (1 MG/DOSE) 2 MG/1.5ML Subcutaneous Sol

## 2020-05-13 NOTE — TELEPHONE ENCOUNTER
Received e-mail from Shiloh Cope stating that the authorization is still pending with her insurnace.

## 2020-05-20 ENCOUNTER — PATIENT MESSAGE (OUTPATIENT)
Dept: ENDOCRINOLOGY CLINIC | Facility: CLINIC | Age: 55
End: 2020-05-20

## 2020-05-20 DIAGNOSIS — E11.40 TYPE 2 DIABETES, UNCONTROLLED, WITH NEUROPATHY (HCC): Primary | ICD-10-CM

## 2020-05-20 DIAGNOSIS — E11.65 TYPE 2 DIABETES, UNCONTROLLED, WITH NEUROPATHY (HCC): Primary | ICD-10-CM

## 2020-05-20 NOTE — TELEPHONE ENCOUNTER
From: Mercy Gallagher  To: FELY Escoto  Sent: 5/20/2020 6:34 AM CDT  Subject: Non-Urgent Medical Question    Good morning, I know [place] has been helping my try to get the deacon, but I got a letter saying I’m not covered.  But the freestyle is no

## 2020-05-20 NOTE — TELEPHONE ENCOUNTER
The [employer] company on file in chart but rep states it was terminated as of 1/31/20. Pt. Contacted & LMOVM that we didn't have her current ins. Info.

## 2020-05-20 NOTE — TELEPHONE ENCOUNTER
My chart message from pt; having allergic reaction on skin from [facility] letter saying dexcom was not covered.  No update from [facility]

## 2020-05-22 NOTE — TELEPHONE ENCOUNTER
Received current insurance information: Camelia :felix [address] #[phone]. [facility] states it isn't covered under pharmacy benefits. Called [email or web address] number.  Rep thinks we will need

## 2020-05-24 RX ORDER — ALPRAZOLAM 0.5 MG/1
TABLET ORAL
Qty: 30 TABLET | Refills: 0 | Status: SHIPPED | OUTPATIENT
Start: 2020-05-24 | End: 2020-06-29

## 2020-05-26 ENCOUNTER — TELEPHONE (OUTPATIENT)
Dept: ENDOCRINOLOGY CLINIC | Facility: CLINIC | Age: 55
End: 2020-05-26

## 2020-05-26 ENCOUNTER — PATIENT MESSAGE (OUTPATIENT)
Dept: ENDOCRINOLOGY CLINIC | Facility: CLINIC | Age: 55
End: 2020-05-26

## 2020-06-05 DIAGNOSIS — F41.9 ANXIETY: ICD-10-CM

## 2020-06-05 NOTE — TELEPHONE ENCOUNTER
Received  fax from [facility] that  pt. was denied by her insurance for her Dexcom G6 CGM.  States she didn't meet the criteria (not on insulin & is aType 2)  I contacted her  [employer] to check if the device was covered under her pharmacy benefit & they said

## 2020-06-08 DIAGNOSIS — E03.9 HYPOTHYROIDISM (ACQUIRED): ICD-10-CM

## 2020-06-08 RX ORDER — LEVOTHYROXINE SODIUM 137 UG/1
TABLET ORAL
Qty: 90 TABLET | Refills: 1 | OUTPATIENT
Start: 2020-06-08

## 2020-06-08 NOTE — TELEPHONE ENCOUNTER
Sertraline 50 mg 1 tab daily filled 2-27-20 90 with 0 refills     LOV 5-5-20   No upcoming apt on file   Labs 9-28-19

## 2020-06-10 DIAGNOSIS — E11.65 TYPE 2 DIABETES MELLITUS WITH HYPERGLYCEMIA, WITHOUT LONG-TERM CURRENT USE OF INSULIN (HCC): Primary | ICD-10-CM

## 2020-06-10 NOTE — TELEPHONE ENCOUNTER
She can choose to go back to [facility] and pay cash price for sensor. The only justification is that her A1C went from 9.1% to 7.2% with having access to CGM data to help modify lifestyle.

## 2020-06-12 NOTE — TELEPHONE ENCOUNTER
Appointment scheduled.  Patient stated that she has enough medication until then    Future Appointments   Date Time Provider Minerva Garnica   6/22/2020  2:00 PM FELY Gage [facility] [place]

## 2020-06-13 ENCOUNTER — APPOINTMENT (OUTPATIENT)
Dept: LAB | Facility: HOSPITAL | Age: 55
End: 2020-06-13
Attending: NURSE PRACTITIONER
Payer: COMMERCIAL

## 2020-06-13 DIAGNOSIS — E55.9 VITAMIN D DEFICIENCY: ICD-10-CM

## 2020-06-13 DIAGNOSIS — R74.8 ALKALINE PHOSPHATASE ELEVATION: ICD-10-CM

## 2020-06-13 DIAGNOSIS — E11.65 TYPE 2 DIABETES MELLITUS WITH HYPERGLYCEMIA, WITHOUT LONG-TERM CURRENT USE OF INSULIN (HCC): ICD-10-CM

## 2020-06-13 PROCEDURE — 82607 VITAMIN B-12: CPT

## 2020-06-13 PROCEDURE — 80053 COMPREHEN METABOLIC PANEL: CPT

## 2020-06-13 PROCEDURE — 84075 ASSAY ALKALINE PHOSPHATASE: CPT

## 2020-06-13 PROCEDURE — 84443 ASSAY THYROID STIM HORMONE: CPT

## 2020-06-13 PROCEDURE — 82570 ASSAY OF URINE CREATININE: CPT

## 2020-06-13 PROCEDURE — 82306 VITAMIN D 25 HYDROXY: CPT

## 2020-06-13 PROCEDURE — 36415 COLL VENOUS BLD VENIPUNCTURE: CPT

## 2020-06-13 PROCEDURE — 82043 UR ALBUMIN QUANTITATIVE: CPT

## 2020-06-13 PROCEDURE — 84080 ASSAY ALKALINE PHOSPHATASES: CPT

## 2020-06-13 PROCEDURE — 83036 HEMOGLOBIN GLYCOSYLATED A1C: CPT

## 2020-06-13 PROCEDURE — 80061 LIPID PANEL: CPT

## 2020-06-17 ENCOUNTER — TELEPHONE (OUTPATIENT)
Dept: INTERNAL MEDICINE CLINIC | Facility: CLINIC | Age: 55
End: 2020-06-17

## 2020-06-17 ENCOUNTER — VIRTUAL PHONE E/M (OUTPATIENT)
Dept: ENDOCRINOLOGY CLINIC | Facility: CLINIC | Age: 55
End: 2020-06-17
Payer: COMMERCIAL

## 2020-06-17 DIAGNOSIS — E11.40 TYPE 2 DIABETES, UNCONTROLLED, WITH NEUROPATHY (HCC): Primary | ICD-10-CM

## 2020-06-17 DIAGNOSIS — E55.9 VITAMIN D DEFICIENCY: ICD-10-CM

## 2020-06-17 DIAGNOSIS — R74.8 ALKALINE PHOSPHATASE ELEVATION: Primary | ICD-10-CM

## 2020-06-17 DIAGNOSIS — E11.65 TYPE 2 DIABETES, UNCONTROLLED, WITH NEUROPATHY (HCC): Primary | ICD-10-CM

## 2020-06-17 PROCEDURE — 95251 CONT GLUC MNTR ANALYSIS I&R: CPT | Performed by: NURSE PRACTITIONER

## 2020-06-17 PROCEDURE — 99442 PHONE E/M BY PHYS 11-20 MIN: CPT | Performed by: NURSE PRACTITIONER

## 2020-06-17 RX ORDER — FLASH GLUCOSE SENSOR
1 KIT MISCELLANEOUS
Qty: 1 EACH | Refills: 5 | Status: SHIPPED | OUTPATIENT
Start: 2020-06-17 | End: 2020-12-09

## 2020-06-17 RX ORDER — FEXOFENADINE HCL 180 MG/1
180 TABLET ORAL DAILY
Qty: 90 TABLET | Refills: 0 | COMMUNITY
Start: 2020-06-17 | End: 2021-07-24

## 2020-06-17 NOTE — TELEPHONE ENCOUNTER
Notes recorded by FELY Beatty on 6/16/2020 at 1:12 PM CDT  Stable, will continue to monitor.  Recheck 6 months    Notes recorded by FELY Beatty on 6/15/2020 at 5:32 PM CDT  Vit D level normal now, Pt to take at least 2000 IU daily of Vit D t

## 2020-06-17 NOTE — PATIENT INSTRUCTIONS
A1C: 9.0% (up from 7.2%)       Keep me posted on appeal for dexcom   Try daily alllegra and/or skin barrier bandage where adhesive is on sensor.    I sent voucher info in the rx for Ayo Christine (it should provide 1 14 day sensor for you at no cost)     Continue

## 2020-06-17 NOTE — PROGRESS NOTES
No chief complaint on file.   Virtual Telephone Check-In     Veronica Allison verbally consents to a Virtual/Telephone Check-In visit on 06/17/20      Patient understands and accepts financial responsibility for any deductible, co-insurance and/or co-pays with bridger ~ using allegra prn. Sent mychart photo [phone] that was indicative of contact dermatitis.  Another reason why she wants to pursue dexcom     CGM Analysis of data: 6-  thru 6-  Sensor download: full report in media  Average glucose : Verio meter if not using bridger CGM      DM Complications:  Microvascular:   Neuropathy: yes  Retinopathy: no  Nephropathy: no    Macrovascular:  PVD: no  CAD: no  Stroke/CVA: no    Modifying factors:  Medication adherence: yes   Lack of CGM usage- not awar Tab TAKE 1 TABLET BY MOUTH EVERY DAY 90 tablet 0   • ALPRAZOLAM 0.5 MG Oral Tab TAKE 1 TABLET BY MOUTH NIGHTLY AS NEEDED 30 tablet 0   • LISINOPRIL-HYDROCHLOROTHIAZIDE 20-12.5 MG Oral Tab TAKE 1 TABLET BY MOUTH EVERY DAY 90 tablet 0   • ATORVASTATIN 20 MG Exam    Constitutional: She is oriented to person, place, and time. Pulmonary/Chest: Effort normal.   Neurological: She is alert and oriented to person, place, and time.    Psychiatric: Her behavior is normal. Judgment normal.       Assessment/Plan:    Ty Expires 6-2020      Fexofenadine HCl 180 MG Oral Tab          Sig: Take 1 tablet (180 mg total) by mouth daily.           Dispense:  90 tablet          Refill:  0      DM Quality Indicators:  A1C as reported above  Retinopathy Screen:no hx DR   Nephropathy

## 2020-06-17 NOTE — ASSESSMENT & PLAN NOTE
Increase in TSH level  Reports adherence w Levothyroxine and dosing precautions   Has appt next week w PCP office-

## 2020-06-18 ENCOUNTER — PATIENT MESSAGE (OUTPATIENT)
Dept: ENDOCRINOLOGY CLINIC | Facility: CLINIC | Age: 55
End: 2020-06-18

## 2020-06-19 NOTE — TELEPHONE ENCOUNTER
From: Sagar Gomez  To: FELY Bowen  Sent: 6/18/2020 10:46 AM CDT  Subject: Prescription Question    Irving Wang, could you make the Ozempic a 3 month supply. I can get a 1 month or a 3 month for 25.00. Would like to use it for the 3 month.  A

## 2020-06-22 ENCOUNTER — OFFICE VISIT (OUTPATIENT)
Dept: INTERNAL MEDICINE CLINIC | Facility: CLINIC | Age: 55
End: 2020-06-22
Payer: COMMERCIAL

## 2020-06-22 VITALS
WEIGHT: 241 LBS | TEMPERATURE: 98 F | BODY MASS INDEX: 39.67 KG/M2 | DIASTOLIC BLOOD PRESSURE: 68 MMHG | SYSTOLIC BLOOD PRESSURE: 122 MMHG | HEIGHT: 65.5 IN | HEART RATE: 70 BPM | RESPIRATION RATE: 16 BRPM

## 2020-06-22 DIAGNOSIS — E03.9 HYPOTHYROIDISM (ACQUIRED): ICD-10-CM

## 2020-06-22 DIAGNOSIS — E78.5 HYPERLIPIDEMIA LDL GOAL <100: ICD-10-CM

## 2020-06-22 DIAGNOSIS — F41.9 ANXIETY: ICD-10-CM

## 2020-06-22 DIAGNOSIS — I10 HYPERTENSION, ESSENTIAL, BENIGN: Primary | ICD-10-CM

## 2020-06-22 PROCEDURE — 99214 OFFICE O/P EST MOD 30 MIN: CPT | Performed by: FAMILY MEDICINE

## 2020-06-22 RX ORDER — LEVOTHYROXINE SODIUM 0.15 MG/1
150 TABLET ORAL
Qty: 30 TABLET | Refills: 1 | Status: SHIPPED | OUTPATIENT
Start: 2020-06-22 | End: 2020-07-15

## 2020-06-22 NOTE — PROGRESS NOTES
CHIEF COMPLAINT:     Patient presents with:  Medication Follow-Up: Refills needed. Pt would like to talk about thyroid med. HPI:   Renate Clark is a 48year old female   Patient presents for recheck of  Hypertension and hyperlipdemia.  Pt has been tablet 0   • ATORVASTATIN 20 MG Oral Tab TAKE 1 TABLET BY MOUTH ONE TIME A DAY  30 tablet 0   • glipiZIDE ER 10 MG Oral Tablet 24 Hr TAKE 1 TABLETS BY MOUTH EVERY DAY 90 tablet 1   • JARDIANCE 25 MG Oral Tab TAKE 1 TABLET BY MOUTH EVERY DAY 90 tablet 1   • sore throat or ear pain. Denies diminished hearing, aural fullness, or tinnitus.   CHEST: Denies chest pain, or palpitations  LUNGS: Denies shortness of breath, cough, or wheezing  GI: Denies abdominal pain, N/V/C/D.   MUSCULOSKELETAL: Denies any arthralgi Consults:  None  1. Hypertension, essential, benign  Conservative measures dicussed. Continue medication. Diet and exercise explained and encouraged. Home blood pressure monitoring. Pt should measure BP’s two to three times per week.  Goal blood pressure

## 2020-06-29 RX ORDER — FLASH GLUCOSE SENSOR
KIT MISCELLANEOUS
Qty: 6 EACH | Refills: 0 | Status: SHIPPED | OUTPATIENT
Start: 2020-06-29 | End: 2020-09-08

## 2020-06-29 RX ORDER — ALPRAZOLAM 0.5 MG/1
TABLET ORAL
Qty: 30 TABLET | Refills: 0 | Status: SHIPPED | OUTPATIENT
Start: 2020-06-29 | End: 2020-08-13

## 2020-07-15 DIAGNOSIS — E03.9 HYPOTHYROIDISM (ACQUIRED): ICD-10-CM

## 2020-07-15 RX ORDER — LEVOTHYROXINE SODIUM 0.15 MG/1
150 TABLET ORAL
Qty: 30 TABLET | Refills: 0 | Status: SHIPPED | OUTPATIENT
Start: 2020-07-15 | End: 2020-08-06

## 2020-07-15 NOTE — TELEPHONE ENCOUNTER
Patient:   ARIADNA SOTOMAYOR            MRN: [id number]            FIN: [id number]              Age:   74 years     Sex:  FEMALE     :  [birth year]   Associated Diagnoses:   None   Author:   EDWIN GABRIEL     Physical Examination   VS/Measurements     Vitals between:   10-DEC-2019 06:13:13   TO   11-DEC-2019 06:13:13                   LAST RESULT MINIMUM MAXIMUM  Temperature 36.4 35.8 36.7  Heart Rate 70 56 85  Respiratory Rate 16 7 19  NISBP           126 87 163  NIDBP           60 53 78  NIMBP           82 69 100  SpO2                    95 93 100      Review / Management   Laboratory results:     Labs between:  10-DEC-2019 06:13 to 11-DEC-2019 06:13  CBC:                 WBC  HgB  Hct  Plt  MCV  RDW   11-DEC-2019 9.0  (L) 11.9  36.1  234  91.4  13.1   COAG:                 INR  PT  PTT  Ddimer  Fibrinogen    11-DEC-2019 1.0  10.8                        .    Subjective:  pt stable, no acute events, pt denies sob/cp/numbness/tingling, pain well controlled  Objective:  VS: WNL, Afebrile   GEN: patient alert and oriented, no acute distress   CV: pedal pulses 2+   PULM: respirations are non-labored   MS: SCD's on bilat LE, DF/PF/EHL intact BLE, SILT BLE, compartments soft, non-tender BLE, dressings c/d/i, no erythema, swelling or drainage, negative carlos, no signs of infection  PSYCH: appropriate, cooperative with exam   Assessment/Plan:   75 y/o F s/p R ABELINO and BLADIMIR POD 1  -WBAT   -DVT PPX: coumadin  -no posterior hip precatuions  -ok to dc home today  -patient exceeded expectations and is able to be discharged home earlier than anticipated  -remove tegaderm POD5  -Pain medication as needed  -PT/OT  -Continue med mangagement  -Continue med precautions; IS, SCD's  -Answered all questions  -Will discuss with Dr. Cecil Montilla   Certified Physician Assistant  Day Pager: [phone]   Levothyroxine 150 mcg  Last OV relevant to medication: 6-22-20  Last refill date: 6-22-20 #/refills: 1  When pt was asked to return for OV: 6 mo.   Upcoming appt/reason: none  Recent labs:

## 2020-07-20 RX ORDER — ATORVASTATIN CALCIUM 20 MG/1
20 TABLET, FILM COATED ORAL DAILY
Qty: 30 TABLET | Refills: 0 | Status: SHIPPED | OUTPATIENT
Start: 2020-07-20 | End: 2020-08-24

## 2020-07-25 DIAGNOSIS — I10 HYPERTENSION, ESSENTIAL, BENIGN: ICD-10-CM

## 2020-07-27 RX ORDER — LISINOPRIL AND HYDROCHLOROTHIAZIDE 20; 12.5 MG/1; MG/1
TABLET ORAL
Qty: 90 TABLET | Refills: 0 | Status: SHIPPED | OUTPATIENT
Start: 2020-07-27 | End: 2020-10-23

## 2020-08-06 DIAGNOSIS — E03.9 HYPOTHYROIDISM (ACQUIRED): ICD-10-CM

## 2020-08-06 RX ORDER — LEVOTHYROXINE SODIUM 0.15 MG/1
150 TABLET ORAL
Qty: 90 TABLET | Refills: 0 | Status: SHIPPED | OUTPATIENT
Start: 2020-08-06 | End: 2020-10-28

## 2020-08-13 RX ORDER — ALPRAZOLAM 0.5 MG/1
TABLET ORAL
Qty: 30 TABLET | Refills: 0 | Status: SHIPPED | OUTPATIENT
Start: 2020-08-13 | End: 2020-10-11

## 2020-08-13 NOTE — TELEPHONE ENCOUNTER
Alprazolam 0.5 mg  Last OV relevant to medication: 6-22-20  Last refill date: 6-29-20 #/refills: 0  When pt was asked to return for OV: 6 mo.   Upcoming appt/reason: none  Recent labs: none

## 2020-08-24 RX ORDER — ATORVASTATIN CALCIUM 20 MG/1
TABLET, FILM COATED ORAL
Qty: 30 TABLET | Refills: 0 | Status: SHIPPED | OUTPATIENT
Start: 2020-08-24 | End: 2020-09-23

## 2020-08-31 DIAGNOSIS — F41.9 ANXIETY: ICD-10-CM

## 2020-08-31 NOTE — TELEPHONE ENCOUNTER
Sertraline HCL 50 mg  Last OV relevant to medication: 6-22-20  Last refill date: 6-8-20 #/refills: 0  When pt was asked to return for OV: 6 mo.   Upcoming appt/reason: none  Recent labs: none

## 2020-09-02 DIAGNOSIS — IMO0002 TYPE 2 DIABETES, UNCONTROLLED, WITH NEUROPATHY: ICD-10-CM

## 2020-09-03 RX ORDER — EMPAGLIFLOZIN 25 MG/1
TABLET, FILM COATED ORAL
Qty: 90 TABLET | Refills: 0 | Status: SHIPPED | OUTPATIENT
Start: 2020-09-03 | End: 2020-12-02

## 2020-09-03 RX ORDER — GLIPIZIDE 10 MG/1
TABLET, FILM COATED, EXTENDED RELEASE ORAL
Qty: 180 TABLET | Refills: 0 | Status: SHIPPED | OUTPATIENT
Start: 2020-09-03 | End: 2020-12-02

## 2020-09-08 DIAGNOSIS — E11.40 TYPE 2 DIABETES, UNCONTROLLED, WITH NEUROPATHY (HCC): ICD-10-CM

## 2020-09-08 DIAGNOSIS — E11.65 TYPE 2 DIABETES, UNCONTROLLED, WITH NEUROPATHY (HCC): ICD-10-CM

## 2020-09-08 RX ORDER — FLASH GLUCOSE SENSOR
KIT MISCELLANEOUS
Qty: 6 EACH | Refills: 0 | Status: SHIPPED | OUTPATIENT
Start: 2020-09-08 | End: 2020-12-09

## 2020-09-23 ENCOUNTER — TELEMEDICINE (OUTPATIENT)
Dept: INTERNAL MEDICINE CLINIC | Facility: CLINIC | Age: 55
End: 2020-09-23
Payer: COMMERCIAL

## 2020-09-23 ENCOUNTER — APPOINTMENT (OUTPATIENT)
Dept: LAB | Facility: HOSPITAL | Age: 55
End: 2020-09-23
Attending: NURSE PRACTITIONER
Payer: COMMERCIAL

## 2020-09-23 DIAGNOSIS — Z20.822 ENCOUNTER BY TELEHEALTH FOR SUSPECTED COVID-19: ICD-10-CM

## 2020-09-23 DIAGNOSIS — J01.40 ACUTE NON-RECURRENT PANSINUSITIS: ICD-10-CM

## 2020-09-23 DIAGNOSIS — Z20.822 ENCOUNTER BY TELEHEALTH FOR SUSPECTED COVID-19: Primary | ICD-10-CM

## 2020-09-23 PROCEDURE — 99213 OFFICE O/P EST LOW 20 MIN: CPT | Performed by: NURSE PRACTITIONER

## 2020-09-23 RX ORDER — ATORVASTATIN CALCIUM 20 MG/1
TABLET, FILM COATED ORAL
Qty: 30 TABLET | Refills: 0 | Status: SHIPPED | OUTPATIENT
Start: 2020-09-23 | End: 2020-10-26

## 2020-09-23 RX ORDER — AMOXICILLIN AND CLAVULANATE POTASSIUM 875; 125 MG/1; MG/1
1 TABLET, FILM COATED ORAL 2 TIMES DAILY
Qty: 20 TABLET | Refills: 0 | Status: SHIPPED | OUTPATIENT
Start: 2020-09-23 | End: 2020-10-03

## 2020-09-23 NOTE — PROGRESS NOTES
Virtual Telephone Check-In    Liat Jones verbally consents to a Virtual/Telephone Check-In visit on 09/23/20. Patient verified identification with name and date of birth.      Patient understands and accepts financial responsibility for any deductibl week. 9 mL 1   • Continuous Blood Gluc Sensor (FREESTYLE NANETTE 14 DAY SENSOR) Does not apply Misc Apply 1 Device topically every 14 (fourteen) days. 1 each 5   • Fexofenadine HCl 180 MG Oral Tab Take 1 tablet (180 mg total) by mouth daily.  90 tablet 0   • ASSESSMENT AND PLAN:     1. Encounter by telehealth for suspected COVID-19  - SARS-COV-2 BY PCR (); Future    2. Acute non-recurrent pansinusitis  - Amoxicillin-Pot Clavulanate 875-125 MG Oral Tab;  Take 1 tablet by mouth 2 (two) times daily for

## 2020-09-26 ENCOUNTER — PATIENT MESSAGE (OUTPATIENT)
Dept: INTERNAL MEDICINE CLINIC | Facility: CLINIC | Age: 55
End: 2020-09-26

## 2020-09-26 LAB — SARS-COV-2 RNA RESP QL NAA+PROBE: DETECTED

## 2020-09-26 NOTE — TELEPHONE ENCOUNTER
Lm that we would try contacting her again before the end of day. Also sending [employer] with instructions.

## 2020-09-26 NOTE — TELEPHONE ENCOUNTER
From: Hali Hoyt  To: FELY Quezada  Sent: 9/26/2020 7:28 AM CDT  Subject: Test Results Question    I have a question about SARS-CoV-2 BY PCR (COVID-19) resulted on 9/26/20, 7:05 AM.    Does this mean I have it? I am freaking out.

## 2020-09-28 ENCOUNTER — TELEPHONE (OUTPATIENT)
Dept: CARDIOLOGY | Age: 55
End: 2020-09-28

## 2020-09-30 ENCOUNTER — APPOINTMENT (OUTPATIENT)
Dept: CARDIOLOGY | Age: 55
End: 2020-09-30

## 2020-10-11 RX ORDER — ALPRAZOLAM 0.5 MG/1
TABLET ORAL
Qty: 30 TABLET | Refills: 0 | Status: SHIPPED | OUTPATIENT
Start: 2020-10-11 | End: 2020-11-27

## 2020-10-23 DIAGNOSIS — I10 HYPERTENSION, ESSENTIAL, BENIGN: ICD-10-CM

## 2020-10-23 RX ORDER — LISINOPRIL AND HYDROCHLOROTHIAZIDE 20; 12.5 MG/1; MG/1
TABLET ORAL
Qty: 90 TABLET | Refills: 0 | Status: SHIPPED | OUTPATIENT
Start: 2020-10-23 | End: 2021-01-14

## 2020-10-26 RX ORDER — ATORVASTATIN CALCIUM 20 MG/1
20 TABLET, FILM COATED ORAL DAILY
Qty: 90 TABLET | Refills: 1 | Status: SHIPPED | OUTPATIENT
Start: 2020-10-26 | End: 2021-04-28

## 2020-10-28 ENCOUNTER — TELEPHONE (OUTPATIENT)
Dept: ENDOCRINOLOGY CLINIC | Facility: CLINIC | Age: 55
End: 2020-10-28

## 2020-10-28 ENCOUNTER — TELEPHONE (OUTPATIENT)
Dept: CARDIOLOGY | Age: 55
End: 2020-10-28

## 2020-10-28 ENCOUNTER — OFFICE VISIT (OUTPATIENT)
Dept: ENDOCRINOLOGY CLINIC | Facility: CLINIC | Age: 55
End: 2020-10-28
Payer: COMMERCIAL

## 2020-10-28 VITALS
WEIGHT: 235 LBS | HEART RATE: 68 BPM | BODY MASS INDEX: 38.68 KG/M2 | HEIGHT: 65.5 IN | DIASTOLIC BLOOD PRESSURE: 70 MMHG | OXYGEN SATURATION: 99 % | SYSTOLIC BLOOD PRESSURE: 110 MMHG

## 2020-10-28 DIAGNOSIS — E11.65 TYPE 2 DIABETES MELLITUS WITH HYPERGLYCEMIA, WITHOUT LONG-TERM CURRENT USE OF INSULIN (HCC): Primary | ICD-10-CM

## 2020-10-28 DIAGNOSIS — E03.9 HYPOTHYROIDISM (ACQUIRED): ICD-10-CM

## 2020-10-28 PROCEDURE — 83036 HEMOGLOBIN GLYCOSYLATED A1C: CPT | Performed by: NURSE PRACTITIONER

## 2020-10-28 PROCEDURE — 3074F SYST BP LT 130 MM HG: CPT | Performed by: NURSE PRACTITIONER

## 2020-10-28 PROCEDURE — 95251 CONT GLUC MNTR ANALYSIS I&R: CPT | Performed by: NURSE PRACTITIONER

## 2020-10-28 PROCEDURE — 3078F DIAST BP <80 MM HG: CPT | Performed by: NURSE PRACTITIONER

## 2020-10-28 PROCEDURE — 99214 OFFICE O/P EST MOD 30 MIN: CPT | Performed by: NURSE PRACTITIONER

## 2020-10-28 PROCEDURE — 3008F BODY MASS INDEX DOCD: CPT | Performed by: NURSE PRACTITIONER

## 2020-10-28 RX ORDER — BLOOD-GLUCOSE TRANSMITTER
EACH MISCELLANEOUS
Qty: 1 EACH | Refills: 3 | Status: SHIPPED | OUTPATIENT
Start: 2020-10-28 | End: 2021-01-13

## 2020-10-28 RX ORDER — INSULIN DEGLUDEC INJECTION 100 U/ML
INJECTION, SOLUTION SUBCUTANEOUS
Qty: 15 ML | Refills: 1 | Status: SHIPPED | OUTPATIENT
Start: 2020-10-28 | End: 2020-10-28

## 2020-10-28 RX ORDER — LEVOTHYROXINE SODIUM 0.15 MG/1
150 TABLET ORAL
Qty: 90 TABLET | Refills: 0 | Status: SHIPPED | OUTPATIENT
Start: 2020-10-28 | End: 2021-01-28

## 2020-10-28 RX ORDER — PEN NEEDLE, DIABETIC 32GX 5/32"
NEEDLE, DISPOSABLE MISCELLANEOUS
Qty: 100 EACH | Refills: 3 | Status: SHIPPED | OUTPATIENT
Start: 2020-10-28 | End: 2021-01-13

## 2020-10-28 RX ORDER — INSULIN DEGLUDEC INJECTION 100 U/ML
INJECTION, SOLUTION SUBCUTANEOUS
Qty: 15 ML | Refills: 1 | COMMUNITY
Start: 2020-10-28 | End: 2021-01-13

## 2020-10-28 RX ORDER — BLOOD-GLUCOSE SENSOR
1 EACH MISCELLANEOUS
Qty: 3 EACH | Refills: 12 | Status: SHIPPED | OUTPATIENT
Start: 2020-10-28 | End: 2021-02-15

## 2020-10-28 RX ORDER — INSULIN LISPRO 100 [IU]/ML
INJECTION, SOLUTION INTRAVENOUS; SUBCUTANEOUS
Qty: 3 ML | Refills: 0 | COMMUNITY
Start: 2020-10-28 | End: 2021-04-15

## 2020-10-28 NOTE — PROGRESS NOTES
Patient presents with:  Diabetes: cf. pt has Eusebio Solano is streaming    HPI:   Kyra Brunner is a 50year old female presenting with type 2  DM medication management.  In the past 3m, DM control has remained uncontrolled as evidenced by A1C today  8.8% (last A1C  9.0% 0.358 - 3.740 mIU/mL 4.960 (H)          Diabetes History:  Type 2 DM ~ 2009    Patient has not had hospitalizations for blood sugar issues and denies any history of pancreatitis    Previous DM therapies:  Metformin- hives/rash  Glimiperide (transitioned to Comment: rarely    Drug use: No    Family History   Problem Relation Age of Onset   • Diabetes Mother         Type 2 DM   • Heart Disorder Mother         CHF and atrial fib   • Diabetes Father    • Diabetes Maternal Grandmother    • Diabetes Brother Continuous Blood Gluc Sensor (BrandictedSTYLE NANETTE 14 DAY SENSOR) Does not apply Misc Apply 1 Device topically every 14 (fourteen) days. 1 each 5   • Fexofenadine HCl 180 MG Oral Tab Take 1 tablet (180 mg total) by mouth daily.  90 tablet 0   • Metoprolol Succin perceived. Bilateral 1+ pedal pulse pedal pulse exam      Neurological: She is alert and oriented to person, place, and time.    Psychiatric: Her behavior is normal. Judgment normal.       Assessment/Plan:    Type 2 diabetes, uncontrolled, with neuropathy to 30 units daily as directed          Dispense:  15 mL          Refill:  1      BD PEN NEEDLE GRACIELA 2ND GEN 32G X 4 MM Does not apply Misc          Sig: 3 injections daily          Dispense:  100 each          Refill:  3      Continuous Blood Gluc Sensor ( content. The risks and benefits of my recommendations, as well as other treatment options were discussed with the patient today. questions were also answered to the best of my knowledge.

## 2020-10-28 NOTE — TELEPHONE ENCOUNTER
Medication(s) to Refill:   Requested Prescriptions     Pending Prescriptions Disp Refills   • LEVOTHYROXINE SODIUM 150 MCG Oral Tab [Pharmacy Med Name: LEVOTHYROXINE 150 MCG TABLET] 90 tablet 0     Sig: TAKE 1 TABLET (150 MCG TOTAL) BY MOUTH BEFORE BREAKFA

## 2020-10-28 NOTE — PATIENT INSTRUCTIONS
Your A1C: 8.8%  This is too high for you and we will work together on lowering your blood sugars to help improve your health  The main goal of diabetes treatment is to keep your sugar from going too high.  We measure your overall blood sugar trends with a H With most insurances,  the process may take up to 3- 4 weeks since they require chart note to be reviewed. It it is not covered under pharmacy: the order is then forwarded to [facility] in [place], [address].  They will check with your medical insurance about choices:  4 oz. regular fruit juice  3-4 glucose tablets  6 oz. regular soda   7-8 jelly beans  3. Recheck blood glucose after 10-15 minutes. If blood glucose is still low (less than 70 mg/dl) repeat the treatment (step 2).   4. If your next meal is more th

## 2020-10-30 RX ORDER — METOPROLOL SUCCINATE 25 MG/1
TABLET, EXTENDED RELEASE ORAL
Qty: 90 TABLET | Refills: 1 | Status: SHIPPED | OUTPATIENT
Start: 2020-10-30 | End: 2020-11-16 | Stop reason: SDUPTHER

## 2020-11-04 NOTE — TELEPHONE ENCOUNTER
Patient called back stating she will p/u Friday and call the office for training appt. Closing encounter.

## 2020-11-04 NOTE — TELEPHONE ENCOUNTER
Called [facility] -Dexcom start program to check status of the Dexcom order. Order was approved through pharmacy and sent to local pharmacy to be picked up by patient.  Called patient and LVM to see if she has the Dexcom and to schedule training

## 2020-11-08 ENCOUNTER — PATIENT MESSAGE (OUTPATIENT)
Dept: INTERNAL MEDICINE CLINIC | Facility: CLINIC | Age: 55
End: 2020-11-08

## 2020-11-08 DIAGNOSIS — Z12.31 ENCOUNTER FOR SCREENING MAMMOGRAM FOR MALIGNANT NEOPLASM OF BREAST: Primary | ICD-10-CM

## 2020-11-09 NOTE — TELEPHONE ENCOUNTER
Last mammogram completed on 12/7/2019, mammogram order pended for your review and approval if appropriate. RECOMMENDATIONS:    ROUTINE MAMMOGRAM AND CLINICAL EVALUATION IN 12 MONTHS.

## 2020-11-09 NOTE — TELEPHONE ENCOUNTER
----- Message from Rogers Nieves, Med Ass't sent at 5/30/2018  8:05 AM PDT -----  Regarding: FW: Non-Urgent Medical Question  Contact: [phone]      ----- Message -----  From: Ct Dasiaalex Montalvo  Sent: 5/30/2018   6:16 AM  To: Neurology Mas  Subject: Non-Urgent Medical Question                      Hi,  Yesterday out of the blue during yoga, my legs (quads, specifically) started feeling like I had run up and down 6000 stairs.  - weak and shaky.     -felt unbalanced/ legs could give out  - bottoms of both feet semi-numb.  - feeling mostly the same today- my quads feel like large heavy bricks and feel weak.    I did nothing in yoga that is unusual from every other yoga day and I didn't do anything out of the ordinary over the weekend. I rested!  -took today off   If I remember right, Dr. Shelley said a relapse is the same symptoms, just aggravated but a flare consists of new symptoms (which is what I am currently experiencing in my legs) and could require a change in meds. Please advise as soon as is possible.  I can come in anytime.     From: Tam Gilmore  To: FELY March  Sent: 11/8/2020 9:26 PM CST  Subject: Referral Request    I need something so I can go for my mammogram. Also mom does too. Titi [employer] Azul.

## 2020-11-12 ENCOUNTER — NURSE ONLY (OUTPATIENT)
Dept: ENDOCRINOLOGY CLINIC | Facility: CLINIC | Age: 55
End: 2020-11-12
Payer: COMMERCIAL

## 2020-11-12 VITALS — BODY MASS INDEX: 38 KG/M2 | WEIGHT: 230.81 LBS

## 2020-11-12 DIAGNOSIS — E11.65 TYPE 2 DIABETES, UNCONTROLLED, WITH NEUROPATHY (HCC): ICD-10-CM

## 2020-11-12 DIAGNOSIS — E11.40 TYPE 2 DIABETES, UNCONTROLLED, WITH NEUROPATHY (HCC): ICD-10-CM

## 2020-11-12 PROCEDURE — 95250 CONT GLUC MNTR PHYS/QHP EQP: CPT | Performed by: DIETITIAN, REGISTERED

## 2020-11-12 NOTE — PROGRESS NOTES
Remington Lam  [id number] was seen for Personal Continuous Glucose Monitoring Training/Start:    Date: 11/12/2020  Referring Provider:  SHELLY Alexis  Start time: 8:30am End time: 9:00am    Sensor Type: Dexcom G6    Patient verbalized understanding of

## 2020-11-13 ENCOUNTER — LAB ENCOUNTER (OUTPATIENT)
Dept: LAB | Age: 55
End: 2020-11-13
Attending: FAMILY MEDICINE
Payer: COMMERCIAL

## 2020-11-13 DIAGNOSIS — R74.8 ALKALINE PHOSPHATASE ELEVATION: ICD-10-CM

## 2020-11-13 DIAGNOSIS — E55.9 VITAMIN D DEFICIENCY: ICD-10-CM

## 2020-11-13 DIAGNOSIS — E03.9 HYPOTHYROIDISM (ACQUIRED): ICD-10-CM

## 2020-11-13 PROCEDURE — 84075 ASSAY ALKALINE PHOSPHATASE: CPT

## 2020-11-13 PROCEDURE — 36415 COLL VENOUS BLD VENIPUNCTURE: CPT

## 2020-11-13 PROCEDURE — 84443 ASSAY THYROID STIM HORMONE: CPT

## 2020-11-13 PROCEDURE — 82306 VITAMIN D 25 HYDROXY: CPT

## 2020-11-13 PROCEDURE — 84080 ASSAY ALKALINE PHOSPHATASES: CPT

## 2020-11-16 ENCOUNTER — OFFICE VISIT (OUTPATIENT)
Dept: CARDIOLOGY | Age: 55
End: 2020-11-16

## 2020-11-16 VITALS
HEART RATE: 64 BPM | SYSTOLIC BLOOD PRESSURE: 110 MMHG | HEIGHT: 65 IN | BODY MASS INDEX: 38.65 KG/M2 | DIASTOLIC BLOOD PRESSURE: 60 MMHG | WEIGHT: 232 LBS

## 2020-11-16 DIAGNOSIS — I47.10 PSVT (PAROXYSMAL SUPRAVENTRICULAR TACHYCARDIA): Primary | ICD-10-CM

## 2020-11-16 PROBLEM — I47.1 PSVT (PAROXYSMAL SUPRAVENTRICULAR TACHYCARDIA): Status: ACTIVE | Noted: 2020-11-16

## 2020-11-16 PROBLEM — I47.1 PSVT (PAROXYSMAL SUPRAVENTRICULAR TACHYCARDIA) (HCC): Status: ACTIVE | Noted: 2020-11-16

## 2020-11-16 PROCEDURE — 99213 OFFICE O/P EST LOW 20 MIN: CPT | Performed by: INTERNAL MEDICINE

## 2020-11-16 RX ORDER — METOPROLOL SUCCINATE 25 MG/1
25 TABLET, EXTENDED RELEASE ORAL DAILY
Qty: 90 TABLET | Refills: 3 | Status: SHIPPED | OUTPATIENT
Start: 2020-11-16

## 2020-11-16 RX ORDER — INSULIN LISPRO 100 [IU]/ML
INJECTION, SOLUTION INTRAVENOUS; SUBCUTANEOUS
COMMUNITY
Start: 2020-10-28

## 2020-11-16 RX ORDER — LEVOTHYROXINE SODIUM 0.15 MG/1
TABLET ORAL
COMMUNITY
Start: 2020-10-28

## 2020-11-16 RX ORDER — INSULIN DEGLUDEC INJECTION 100 U/ML
INJECTION, SOLUTION SUBCUTANEOUS
COMMUNITY
Start: 2020-10-28

## 2020-11-27 RX ORDER — SEMAGLUTIDE 1.34 MG/ML
1 INJECTION, SOLUTION SUBCUTANEOUS WEEKLY
Qty: 4.5 ML | Refills: 1 | Status: SHIPPED | OUTPATIENT
Start: 2020-11-27 | End: 2021-02-01

## 2020-11-27 RX ORDER — ALPRAZOLAM 0.5 MG/1
TABLET ORAL
Qty: 30 TABLET | Refills: 0 | Status: SHIPPED | OUTPATIENT
Start: 2020-11-27 | End: 2021-01-07

## 2020-11-27 NOTE — TELEPHONE ENCOUNTER
Medication(s) to Refill:   Requested Prescriptions     Pending Prescriptions Disp Refills   • OZEMPIC, 1 MG/DOSE, 2 MG/1.5ML Subcutaneous Solution Pen-injector [Pharmacy Med Name: OZEMPIC 1 MG DOSE PEN] 9 mL 1     Sig: INJECT 1 MG INTO THE SKIN ONCE A WEEK

## 2020-12-02 DIAGNOSIS — E11.65 TYPE 2 DIABETES, UNCONTROLLED, WITH NEUROPATHY (HCC): ICD-10-CM

## 2020-12-02 DIAGNOSIS — E11.40 TYPE 2 DIABETES, UNCONTROLLED, WITH NEUROPATHY (HCC): ICD-10-CM

## 2020-12-02 DIAGNOSIS — F41.9 ANXIETY: ICD-10-CM

## 2020-12-02 RX ORDER — EMPAGLIFLOZIN 25 MG/1
TABLET, FILM COATED ORAL
Qty: 90 TABLET | Refills: 0 | Status: SHIPPED | OUTPATIENT
Start: 2020-12-02 | End: 2021-03-08

## 2020-12-02 RX ORDER — GLIPIZIDE 10 MG/1
TABLET, FILM COATED, EXTENDED RELEASE ORAL
Qty: 180 TABLET | Refills: 0 | Status: SHIPPED | OUTPATIENT
Start: 2020-12-02 | End: 2021-04-15

## 2020-12-09 ENCOUNTER — OFFICE VISIT (OUTPATIENT)
Dept: ENDOCRINOLOGY CLINIC | Facility: CLINIC | Age: 55
End: 2020-12-09
Payer: COMMERCIAL

## 2020-12-09 VITALS
DIASTOLIC BLOOD PRESSURE: 60 MMHG | BODY MASS INDEX: 38.99 KG/M2 | SYSTOLIC BLOOD PRESSURE: 104 MMHG | HEART RATE: 74 BPM | WEIGHT: 234 LBS | HEIGHT: 65 IN | OXYGEN SATURATION: 98 %

## 2020-12-09 DIAGNOSIS — Z79.4 TYPE 2 DIABETES MELLITUS WITH HYPERGLYCEMIA, WITH LONG-TERM CURRENT USE OF INSULIN (HCC): Primary | ICD-10-CM

## 2020-12-09 DIAGNOSIS — E11.65 TYPE 2 DIABETES MELLITUS WITH HYPERGLYCEMIA, WITH LONG-TERM CURRENT USE OF INSULIN (HCC): Primary | ICD-10-CM

## 2020-12-09 PROCEDURE — 3078F DIAST BP <80 MM HG: CPT | Performed by: NURSE PRACTITIONER

## 2020-12-09 PROCEDURE — 99214 OFFICE O/P EST MOD 30 MIN: CPT | Performed by: NURSE PRACTITIONER

## 2020-12-09 PROCEDURE — 95251 CONT GLUC MNTR ANALYSIS I&R: CPT | Performed by: NURSE PRACTITIONER

## 2020-12-09 PROCEDURE — 3074F SYST BP LT 130 MM HG: CPT | Performed by: NURSE PRACTITIONER

## 2020-12-09 PROCEDURE — 3008F BODY MASS INDEX DOCD: CPT | Performed by: NURSE PRACTITIONER

## 2020-12-09 NOTE — PATIENT INSTRUCTIONS
Your A1C: update due at next visit   Your trends are better on the dexcom   Time in target should be > 70%   The A1C test provides us with your average blood sugar for the past 3 months.  Keeping an A1C less than 7% helps reduce or delay health problems reyes sugar:   · Shakiness or dizziness  · Cold, clammy skin or sweating  · Feeling hungry  · Headache  · Nervousness  · A hard, fast heartbeat  · Weakness  · Confusion or irritability  · Blurred eyesight  · Having nightmares or waking up confused or sweating  · preferred pharmacy does not have the requested medication in stock (e.g. Backordered), it is your responsibility to find another pharmacy that has the requested medication available.   We will gladly send a new prescription to that pharmacy at your request.

## 2020-12-09 NOTE — PROGRESS NOTES
Patient presents with:  Diabetes: cf. follow up. has dexcom    HPI:   Dory Grey is a 50year old female presenting with type 2  DM medication management.  In the past 6 weeks her BG trends have improved   At last visit, basal insulin rx was started- taking 10 3 x daily   If -300, take 3 units  If BG over 300, take 4 units       HGBA1C:    Lab Results   Component Value Date    A1C 8.8 (A) 10/28/2020    A1C 9.0 (H) 06/13/2020    A1C 7.2 (A) 10/01/2019     (H) 06/13/2020      Lab Results   Component V and atrial fib   • Diabetes Father    • Diabetes Maternal Grandmother    • Diabetes Brother         One brother with Type 2 DM   • Thyroid Disorder Neg    • Thyroid disease Neg      Current Outpatient Medications   Medication Sig Dispense Refill   • PAULO VERIO) In Vitro Strip Test 2 times daily 200 strip 1   • Albuterol Sulfate HFA (PROAIR HFA) 108 (90 BASE) MCG/ACT Inhalation Aero Soln Inhale 2 puffs into the lungs every 4 (four) hours as needed for Wheezing.  1 Inhaler 6     DM associated review of  sympt units   If BG over 301, take 5 units      Continue:   Ozempic 1mg  SQ once weekly   Jardiance 25mg once daily  Glipizide XL 10mg : 1 tab twice daily   Continue site rotation  site selection/rotation for MDI therapy to avoid lipodystrophy     Reminded pt on

## 2020-12-12 ENCOUNTER — HOSPITAL ENCOUNTER (OUTPATIENT)
Dept: MAMMOGRAPHY | Age: 55
Discharge: HOME | End: 2020-12-12
Attending: FAMILY MEDICINE
Payer: COMMERCIAL

## 2020-12-12 DIAGNOSIS — Z12.31 ENCOUNTER FOR SCREENING MAMMOGRAM FOR MALIGNANT NEOPLASM OF BREAST: ICD-10-CM

## 2020-12-12 PROCEDURE — 77063 BREAST TOMOSYNTHESIS BI: CPT | Performed by: FAMILY MEDICINE

## 2020-12-12 PROCEDURE — 77067 SCR MAMMO BI INCL CAD: CPT | Performed by: FAMILY MEDICINE

## 2020-12-14 ENCOUNTER — TELEPHONE (OUTPATIENT)
Dept: INTERNAL MEDICINE CLINIC | Facility: CLINIC | Age: 55
End: 2020-12-14

## 2020-12-28 RX ORDER — ALPRAZOLAM 0.5 MG/1
TABLET ORAL
Qty: 30 TABLET | Refills: 0 | OUTPATIENT
Start: 2020-12-28

## 2021-01-07 ENCOUNTER — OFFICE VISIT (OUTPATIENT)
Dept: INTERNAL MEDICINE CLINIC | Facility: CLINIC | Age: 56
End: 2021-01-07
Payer: COMMERCIAL

## 2021-01-07 VITALS
TEMPERATURE: 99 F | WEIGHT: 233 LBS | RESPIRATION RATE: 16 BRPM | HEART RATE: 72 BPM | HEIGHT: 65 IN | DIASTOLIC BLOOD PRESSURE: 70 MMHG | SYSTOLIC BLOOD PRESSURE: 122 MMHG | BODY MASS INDEX: 38.82 KG/M2

## 2021-01-07 DIAGNOSIS — G47.00 INSOMNIA, UNSPECIFIED TYPE: ICD-10-CM

## 2021-01-07 DIAGNOSIS — E03.9 HYPOTHYROIDISM (ACQUIRED): ICD-10-CM

## 2021-01-07 DIAGNOSIS — F41.9 ANXIETY: ICD-10-CM

## 2021-01-07 DIAGNOSIS — I10 HYPERTENSION, ESSENTIAL, BENIGN: Primary | ICD-10-CM

## 2021-01-07 DIAGNOSIS — E11.65 TYPE 2 DIABETES, UNCONTROLLED, WITH NEUROPATHY (HCC): ICD-10-CM

## 2021-01-07 DIAGNOSIS — E11.40 TYPE 2 DIABETES, UNCONTROLLED, WITH NEUROPATHY (HCC): ICD-10-CM

## 2021-01-07 DIAGNOSIS — Z23 NEED FOR SHINGLES VACCINE: ICD-10-CM

## 2021-01-07 DIAGNOSIS — E78.5 HYPERLIPIDEMIA LDL GOAL <100: ICD-10-CM

## 2021-01-07 PROCEDURE — 99214 OFFICE O/P EST MOD 30 MIN: CPT | Performed by: FAMILY MEDICINE

## 2021-01-07 PROCEDURE — 90471 IMMUNIZATION ADMIN: CPT | Performed by: FAMILY MEDICINE

## 2021-01-07 PROCEDURE — 90750 HZV VACC RECOMBINANT IM: CPT | Performed by: FAMILY MEDICINE

## 2021-01-07 PROCEDURE — 3008F BODY MASS INDEX DOCD: CPT | Performed by: FAMILY MEDICINE

## 2021-01-07 PROCEDURE — 3078F DIAST BP <80 MM HG: CPT | Performed by: FAMILY MEDICINE

## 2021-01-07 PROCEDURE — 3074F SYST BP LT 130 MM HG: CPT | Performed by: FAMILY MEDICINE

## 2021-01-07 RX ORDER — ALPRAZOLAM 0.5 MG/1
0.5 TABLET ORAL NIGHTLY PRN
Qty: 30 TABLET | Refills: 0 | Status: SHIPPED | OUTPATIENT
Start: 2021-01-07 | End: 2021-02-14

## 2021-01-07 NOTE — PROGRESS NOTES
CHIEF COMPLAINT:     Patient presents with:  Medication Follow-Up      HPI:   Desirae Webber is a 50year old female   Patient presents for recheck of  Hypertension and hyperlipdemia.  Pt has been taking medications as instructed, no medication side effe 06/13/2020 125   09/28/2019 131   03/09/2019 136     CHOLESTEROL, TOTAL (mg/dL)   Date Value   08/04/2012 146     CHOLESTEROL (mg/dL)   Date Value   02/08/2014 159     HDL Cholesterol (mg/dL)   Date Value   06/13/2020 37 (L)   09/28/2019 44   03/09/2019 Continuous Blood Gluc Sensor (DEXCOM G6 SENSOR) Does not apply Misc 1 Device by Does not apply route Every 10 days.  3 each 12   • Continuous Blood Gluc Transmit (DEXCOM G6 TRANSMITTER) Does not apply Misc Use as directed with Dexcom G 6 sensor 1 each 3   • 2      Binge frequency: Never      Comment: rarely    Drug use: No       REVIEW OF SYSTEMS:   GENERAL:  Denies fever, chills,weight change, decreased appetite and weakness. SKIN: Denies rashes, skin wounds or ulcers.   EYES: Denies blurred vision or double (0.5 mg total) by mouth nightly as needed. AT BEDTIME       Imaging & Consults:  ZOSTER VACC RECOMBINANT IM NJX    1. Hypertension, essential, benign  Conservative measures dicussed. Continue medication. Diet and exercise explained and encouraged.   Fastin while under the influence of the medication. Medication should only be used if patient has at least eight hours that will be able to be dedicated to sleep. Patient understands and agrees with the above plan.      The patient indicates understanding of the

## 2021-01-12 NOTE — PROGRESS NOTES
Patient presents with:  Diabetes: cf. pt has dexcom is streaming    HPI:   Zeferino Mccoy is a 50year old female presenting with type 2 DM medication management.  In the past 3m her DM control has improved to 7.7%  (last A1C 8.8%)   She was surprised at how well (H) 06/13/2020     (H) 06/13/2020      Lab Results   Component Value Date    CHOLEST 125 06/13/2020    TRIG 126 06/13/2020    HDL 37 (L) 06/13/2020    LDL 63 06/13/2020    MICROALBCREA 7.0 06/13/2020    CREATSERUM 0.66 06/13/2020    GFRNAA 102 06/13 directed with Dexcom G 6 sensor 1 each 3   • Insulin Degludec (TRESIBA FLEXTOUCH) 100 UNIT/ML Subcutaneous Solution Pen-injector Uses up to 30 units daily as directed 15 mL 1   • BD PEN NEEDLE GRACIELA 2ND GEN 32G X 4 MM Does not apply Misc 3 injections daily no  Neurological: Paresthesias: no  HEENT: Blurred vision: no  Skin: no wounds or skin rash   Hematological: Hypoglycemia: no    Review of Systems   Constitutional: Positive for fatigue. Respiratory: Negative for cough and shortness of breath.     Cardiov once daily  Glipizide XL 10mg : 1 tab twice daily     Humalog with meals (when BG over 200mg/dl : either before meal or 2 hour after meals)     If -250, take  take 3 units  If -300, take 4 units   If BG over 301, take 5 units       Continue sit 6-2020  . atorvastatin rx. Last dilated eye exam: Last Dilated Eye Exam: 12/07/20 Exam shows retinopathy?  Eye Exam shows Diabetic Retinopathy?: No  Last diabetic foot exam: Last Foot Exam: 01/13/21  Date of last PHQ-2 depression screen: PHQ-2 - Date of l

## 2021-01-13 ENCOUNTER — OFFICE VISIT (OUTPATIENT)
Dept: ENDOCRINOLOGY CLINIC | Facility: CLINIC | Age: 56
End: 2021-01-13
Payer: COMMERCIAL

## 2021-01-13 VITALS
WEIGHT: 232 LBS | HEART RATE: 68 BPM | SYSTOLIC BLOOD PRESSURE: 110 MMHG | BODY MASS INDEX: 38.65 KG/M2 | HEIGHT: 65 IN | OXYGEN SATURATION: 99 % | DIASTOLIC BLOOD PRESSURE: 70 MMHG

## 2021-01-13 DIAGNOSIS — E66.01 CLASS 2 SEVERE OBESITY DUE TO EXCESS CALORIES WITH SERIOUS COMORBIDITY AND BODY MASS INDEX (BMI) OF 38.0 TO 38.9 IN ADULT (HCC): ICD-10-CM

## 2021-01-13 DIAGNOSIS — E03.9 HYPOTHYROIDISM (ACQUIRED): ICD-10-CM

## 2021-01-13 DIAGNOSIS — Z79.4 TYPE 2 DIABETES MELLITUS WITH HYPERGLYCEMIA, WITH LONG-TERM CURRENT USE OF INSULIN (HCC): Primary | ICD-10-CM

## 2021-01-13 DIAGNOSIS — E78.5 HYPERLIPIDEMIA LDL GOAL <100: ICD-10-CM

## 2021-01-13 DIAGNOSIS — E11.65 TYPE 2 DIABETES MELLITUS WITH HYPERGLYCEMIA, WITH LONG-TERM CURRENT USE OF INSULIN (HCC): Primary | ICD-10-CM

## 2021-01-13 LAB
CARTRIDGE LOT#: 756 NUMERIC
HEMOGLOBIN A1C: 7.7 % (ref 4.3–5.6)

## 2021-01-13 PROCEDURE — 83036 HEMOGLOBIN GLYCOSYLATED A1C: CPT | Performed by: NURSE PRACTITIONER

## 2021-01-13 PROCEDURE — 99214 OFFICE O/P EST MOD 30 MIN: CPT | Performed by: NURSE PRACTITIONER

## 2021-01-13 PROCEDURE — 95251 CONT GLUC MNTR ANALYSIS I&R: CPT | Performed by: NURSE PRACTITIONER

## 2021-01-13 PROCEDURE — 3074F SYST BP LT 130 MM HG: CPT | Performed by: NURSE PRACTITIONER

## 2021-01-13 PROCEDURE — 3078F DIAST BP <80 MM HG: CPT | Performed by: NURSE PRACTITIONER

## 2021-01-13 PROCEDURE — 3008F BODY MASS INDEX DOCD: CPT | Performed by: NURSE PRACTITIONER

## 2021-01-13 PROCEDURE — 3051F HG A1C>EQUAL 7.0%<8.0%: CPT | Performed by: FAMILY MEDICINE

## 2021-01-13 RX ORDER — INSULIN DEGLUDEC INJECTION 100 U/ML
INJECTION, SOLUTION SUBCUTANEOUS
Qty: 15 ML | Refills: 1 | OUTPATIENT
Start: 2021-01-13 | End: 2021-02-22

## 2021-01-13 RX ORDER — PEN NEEDLE, DIABETIC 32GX 5/32"
NEEDLE, DISPOSABLE MISCELLANEOUS
Qty: 100 EACH | Refills: 3 | Status: SHIPPED | OUTPATIENT
Start: 2021-01-13 | End: 2021-08-25

## 2021-01-13 RX ORDER — BLOOD-GLUCOSE TRANSMITTER
EACH MISCELLANEOUS
Qty: 1 EACH | Refills: 3 | Status: SHIPPED | OUTPATIENT
Start: 2021-01-13

## 2021-01-13 NOTE — PATIENT INSTRUCTIONS
Your A1C: 7.7% (last A1C 8.8%)   This is much better for you and we will work together on lowering your blood sugars to help improve your health    The A1C test provides us with your average blood sugar for the past 3 months.  Keeping an A1C less than 7% he eat 3 meals/day   Aim for 30-45 grams of carbohydrate at each meal.   If you skip a meal, you cannot make up for this at the next meal.       There are some great smart phone apps that can help you identify carbohydrates in the foods you are eating as well after meals)     If -250, take  take 3 units  If -300, take 4 units   If BG over 301, take 5 units     It is important to take all of your medications as prescribed.  Please call me if you cannot get the prescriptions filled or are having issues medication and have them send an electronic request or submit request through the “request refill” option in your [employer] account.   · Refills are not addressed after hours or on weekends; covering providers  do not authorize routine medications on weekends

## 2021-01-14 ENCOUNTER — LAB ENCOUNTER (OUTPATIENT)
Dept: LAB | Age: 56
End: 2021-01-14
Attending: FAMILY MEDICINE
Payer: COMMERCIAL

## 2021-01-14 DIAGNOSIS — E78.5 HYPERLIPIDEMIA LDL GOAL <100: ICD-10-CM

## 2021-01-14 DIAGNOSIS — E11.65 TYPE 2 DIABETES, UNCONTROLLED, WITH NEUROPATHY (HCC): ICD-10-CM

## 2021-01-14 DIAGNOSIS — I10 HYPERTENSION, ESSENTIAL, BENIGN: ICD-10-CM

## 2021-01-14 DIAGNOSIS — E03.9 HYPOTHYROIDISM (ACQUIRED): ICD-10-CM

## 2021-01-14 DIAGNOSIS — E11.40 TYPE 2 DIABETES, UNCONTROLLED, WITH NEUROPATHY (HCC): ICD-10-CM

## 2021-01-14 DIAGNOSIS — E11.65 TYPE 2 DIABETES MELLITUS WITH HYPERGLYCEMIA, WITH LONG-TERM CURRENT USE OF INSULIN (HCC): ICD-10-CM

## 2021-01-14 DIAGNOSIS — Z79.4 TYPE 2 DIABETES MELLITUS WITH HYPERGLYCEMIA, WITH LONG-TERM CURRENT USE OF INSULIN (HCC): ICD-10-CM

## 2021-01-14 LAB
ALBUMIN SERPL-MCNC: 3.7 G/DL (ref 3.4–5)
ALBUMIN/GLOB SERPL: 0.9 {RATIO} (ref 1–2)
ALP LIVER SERPL-CCNC: 113 U/L
ALT SERPL-CCNC: 40 U/L
ANION GAP SERPL CALC-SCNC: 5 MMOL/L (ref 0–18)
AST SERPL-CCNC: 15 U/L (ref 15–37)
BILIRUB SERPL-MCNC: 0.8 MG/DL (ref 0.1–2)
BUN BLD-MCNC: 24 MG/DL (ref 7–18)
BUN/CREAT SERPL: 31.6 (ref 10–20)
CALCIUM BLD-MCNC: 9.4 MG/DL (ref 8.5–10.1)
CHLORIDE SERPL-SCNC: 106 MMOL/L (ref 98–112)
CHOLEST SMN-MCNC: 134 MG/DL (ref ?–200)
CO2 SERPL-SCNC: 28 MMOL/L (ref 21–32)
CREAT BLD-MCNC: 0.76 MG/DL
CREAT UR-SCNC: 135 MG/DL
EST. AVERAGE GLUCOSE BLD GHB EST-MCNC: 183 MG/DL (ref 68–126)
GLOBULIN PLAS-MCNC: 4 G/DL (ref 2.8–4.4)
GLUCOSE BLD-MCNC: 141 MG/DL (ref 70–99)
HBA1C MFR BLD HPLC: 8 % (ref ?–5.7)
HDLC SERPL-MCNC: 40 MG/DL (ref 40–59)
LDLC SERPL CALC-MCNC: 73 MG/DL (ref ?–100)
M PROTEIN MFR SERPL ELPH: 7.7 G/DL (ref 6.4–8.2)
MICROALBUMIN UR-MCNC: 1.13 MG/DL
MICROALBUMIN/CREAT 24H UR-RTO: 8.4 UG/MG (ref ?–30)
NONHDLC SERPL-MCNC: 94 MG/DL (ref ?–130)
OSMOLALITY SERPL CALC.SUM OF ELEC: 294 MOSM/KG (ref 275–295)
PATIENT FASTING Y/N/NP: YES
PATIENT FASTING Y/N/NP: YES
POTASSIUM SERPL-SCNC: 4.2 MMOL/L (ref 3.5–5.1)
SODIUM SERPL-SCNC: 139 MMOL/L (ref 136–145)
TRIGL SERPL-MCNC: 104 MG/DL (ref 30–149)
TSI SER-ACNC: 1.38 MIU/ML (ref 0.36–3.74)
VLDLC SERPL CALC-MCNC: 21 MG/DL (ref 0–30)

## 2021-01-14 PROCEDURE — 36415 COLL VENOUS BLD VENIPUNCTURE: CPT

## 2021-01-14 PROCEDURE — 82570 ASSAY OF URINE CREATININE: CPT

## 2021-01-14 PROCEDURE — 80061 LIPID PANEL: CPT

## 2021-01-14 PROCEDURE — 84443 ASSAY THYROID STIM HORMONE: CPT

## 2021-01-14 PROCEDURE — 3061F NEG MICROALBUMINURIA REV: CPT | Performed by: FAMILY MEDICINE

## 2021-01-14 PROCEDURE — 82043 UR ALBUMIN QUANTITATIVE: CPT

## 2021-01-14 PROCEDURE — 83036 HEMOGLOBIN GLYCOSYLATED A1C: CPT

## 2021-01-14 PROCEDURE — 80053 COMPREHEN METABOLIC PANEL: CPT

## 2021-01-14 RX ORDER — LISINOPRIL AND HYDROCHLOROTHIAZIDE 20; 12.5 MG/1; MG/1
TABLET ORAL
Qty: 90 TABLET | Refills: 0 | Status: SHIPPED | OUTPATIENT
Start: 2021-01-14 | End: 2021-04-08

## 2021-01-26 ENCOUNTER — PATIENT MESSAGE (OUTPATIENT)
Dept: ENDOCRINOLOGY CLINIC | Facility: CLINIC | Age: 56
End: 2021-01-26

## 2021-01-26 NOTE — TELEPHONE ENCOUNTER
From: Kate Gayle  To: FELY Rankin  Sent: 1/26/2021 8:51 AM CST  Subject: Other    Irving Millard,     Here is the information for he [employer] for Diabetes program. I am very excited to start and hoping for a weight loss

## 2021-01-28 DIAGNOSIS — E03.9 HYPOTHYROIDISM (ACQUIRED): ICD-10-CM

## 2021-01-28 RX ORDER — LEVOTHYROXINE SODIUM 0.15 MG/1
150 TABLET ORAL
Qty: 90 TABLET | Refills: 0 | Status: SHIPPED | OUTPATIENT
Start: 2021-01-28 | End: 2021-04-22

## 2021-02-01 RX ORDER — SEMAGLUTIDE 1.34 MG/ML
1 INJECTION, SOLUTION SUBCUTANEOUS WEEKLY
Qty: 9 ML | Refills: 1 | Status: SHIPPED | OUTPATIENT
Start: 2021-02-01 | End: 2021-05-12 | Stop reason: ALTCHOICE

## 2021-02-01 NOTE — TELEPHONE ENCOUNTER
Orders Placed This Encounter      Semaglutide, 1 MG/DOSE, (OZEMPIC, 1 MG/DOSE,) 2 MG/1.5ML Subcutaneous Solution Pen-injector          Sig: Inject 1 mg into the skin once a week.           Dispense:  9 mL          Refill:  1          Order Comments: 90 d rx

## 2021-02-11 DIAGNOSIS — Z23 NEED FOR VACCINATION: ICD-10-CM

## 2021-02-13 ENCOUNTER — IMMUNIZATION (OUTPATIENT)
Dept: LAB | Age: 56
End: 2021-02-13
Attending: HOSPITALIST
Payer: COMMERCIAL

## 2021-02-13 DIAGNOSIS — Z23 NEED FOR VACCINATION: Primary | ICD-10-CM

## 2021-02-13 PROCEDURE — 0001A SARSCOV2 VAC 30MCG/0.3ML IM: CPT

## 2021-02-14 DIAGNOSIS — F41.9 ANXIETY: ICD-10-CM

## 2021-02-14 RX ORDER — ALPRAZOLAM 0.5 MG/1
0.5 TABLET ORAL NIGHTLY PRN
Qty: 30 TABLET | Refills: 0 | Status: SHIPPED | OUTPATIENT
Start: 2021-02-14 | End: 2021-03-25

## 2021-02-16 RX ORDER — BLOOD-GLUCOSE SENSOR
1 EACH MISCELLANEOUS
Qty: 3 EACH | Refills: 12 | Status: SHIPPED | OUTPATIENT
Start: 2021-02-16

## 2021-02-22 RX ORDER — INSULIN DEGLUDEC INJECTION 100 U/ML
INJECTION, SOLUTION SUBCUTANEOUS
Qty: 15 ML | Refills: 3 | Status: SHIPPED | OUTPATIENT
Start: 2021-02-22 | End: 2023-11-15

## 2021-03-06 ENCOUNTER — IMMUNIZATION (OUTPATIENT)
Dept: LAB | Age: 56
End: 2021-03-06
Attending: HOSPITALIST
Payer: COMMERCIAL

## 2021-03-06 DIAGNOSIS — Z23 NEED FOR VACCINATION: Primary | ICD-10-CM

## 2021-03-06 PROCEDURE — 0002A SARSCOV2 VAC 30MCG/0.3ML IM: CPT

## 2021-03-08 DIAGNOSIS — IMO0002 TYPE 2 DIABETES, UNCONTROLLED, WITH NEUROPATHY: ICD-10-CM

## 2021-03-08 DIAGNOSIS — F41.9 ANXIETY: ICD-10-CM

## 2021-03-08 RX ORDER — EMPAGLIFLOZIN 25 MG/1
1 TABLET, FILM COATED ORAL DAILY
Qty: 90 TABLET | Refills: 1 | Status: SHIPPED | OUTPATIENT
Start: 2021-03-08 | End: 2021-07-24

## 2021-03-08 NOTE — TELEPHONE ENCOUNTER
Name from pharmacy: SERTRALINE HCL 50 MG TABLET          Will file in chart as: SERTRALINE HCL 50 MG Oral Tab    Sig: TAKE 1 TABLET BY MOUTH EVERY DAY    Disp:  90 tablet    Refills:  0 (Pharmacy requested: Not specified)    Start: 3/8/2021    Class: Margaret Carvalho

## 2021-03-25 DIAGNOSIS — F41.9 ANXIETY: ICD-10-CM

## 2021-03-25 RX ORDER — ALPRAZOLAM 0.5 MG/1
0.5 TABLET ORAL NIGHTLY PRN
Qty: 30 TABLET | Refills: 0 | Status: SHIPPED | OUTPATIENT
Start: 2021-03-25 | End: 2021-04-29

## 2021-03-25 NOTE — TELEPHONE ENCOUNTER
Name from pharmacy: ALPRAZOLAM 0.5 MG TABLET          Will file in chart as: ALPRAZOLAM 0.5 MG Oral Tab    Sig: TAKE 1 TABLET (0.5 MG TOTAL) BY MOUTH NIGHTLY AS NEEDED.  AT BEDTIME    Disp:  30 tablet    Refills:  0    Start: 3/25/2021    Class: Normal    N

## 2021-04-08 DIAGNOSIS — I10 HYPERTENSION, ESSENTIAL, BENIGN: ICD-10-CM

## 2021-04-08 RX ORDER — LISINOPRIL AND HYDROCHLOROTHIAZIDE 20; 12.5 MG/1; MG/1
TABLET ORAL
Qty: 90 TABLET | Refills: 0 | Status: SHIPPED | OUTPATIENT
Start: 2021-04-08 | End: 2021-07-24

## 2021-04-15 RX ORDER — INSULIN LISPRO 100 [IU]/ML
INJECTION, SOLUTION INTRAVENOUS; SUBCUTANEOUS
Qty: 15 ML | Refills: 1 | Status: SHIPPED | OUTPATIENT
Start: 2021-04-15 | End: 2021-07-14

## 2021-04-15 RX ORDER — GLIPIZIDE 10 MG/1
20 TABLET, FILM COATED, EXTENDED RELEASE ORAL DAILY
Qty: 180 TABLET | Refills: 0 | Status: SHIPPED | OUTPATIENT
Start: 2021-04-15 | End: 2021-07-24

## 2021-04-22 DIAGNOSIS — E03.9 HYPOTHYROIDISM (ACQUIRED): ICD-10-CM

## 2021-04-22 RX ORDER — LEVOTHYROXINE SODIUM 0.15 MG/1
150 TABLET ORAL
Qty: 90 TABLET | Refills: 0 | Status: SHIPPED | OUTPATIENT
Start: 2021-04-22 | End: 2021-07-09

## 2021-04-22 NOTE — TELEPHONE ENCOUNTER
Name from pharmacy: LEVOTHYROXINE 150 MCG TABLET          Will file in chart as: LEVOTHYROXINE SODIUM 150 MCG Oral Tab    Sig: TAKE 1 TABLET (150 MCG TOTAL) BY MOUTH BEFORE BREAKFAST.     Disp:  90 tablet    Refills:  0 (Pharmacy requested: Not specified)

## 2021-04-28 DIAGNOSIS — F41.9 ANXIETY: ICD-10-CM

## 2021-04-28 RX ORDER — ATORVASTATIN CALCIUM 20 MG/1
TABLET, FILM COATED ORAL
Qty: 90 TABLET | Refills: 0 | Status: SHIPPED | OUTPATIENT
Start: 2021-04-28 | End: 2021-07-24

## 2021-04-29 RX ORDER — ALPRAZOLAM 0.5 MG/1
0.5 TABLET ORAL NIGHTLY PRN
Qty: 30 TABLET | Refills: 0 | Status: SHIPPED | OUTPATIENT
Start: 2021-04-29 | End: 2021-06-06

## 2021-04-29 NOTE — TELEPHONE ENCOUNTER
Name from pharmacy: ALPRAZOLAM 0.5 MG TABLET          Will file in chart as: ALPRAZOLAM 0.5 MG Oral Tab    Sig: TAKE 1 TABLET (0.5 MG TOTAL) BY MOUTH NIGHTLY AS NEEDED.  AT BEDTIME    Disp:  30 tablet    Refills:  0    Start: 4/28/2021    Class: Normal    N

## 2021-05-11 NOTE — PROGRESS NOTES
Patient presents with:  Diabetes: Follow up, pt has Dexcom and is sharing    HPI:   Bonnie Taylor is a 50year old female presenting with type 2 DM medication management.  In the past 3m her DM control has worsened: A1C 8.1% today  (last A1C: 8.0%)   She had part hour after meals)     If -250, take  take 3 units  If -300, take 4 units   If BG over 301, take 5 units          HGBA1C:    Lab Results   Component Value Date    A1C 8.0 (H) 01/14/2021    A1C 7.7 (A) 01/13/2021    A1C 8.8 (A) 10/28/2020    EAG Current Outpatient Medications   Medication Sig Dispense Refill   • ALPRAZOLAM 0.5 MG Oral Tab TAKE 1 TABLET (0.5 MG TOTAL) BY MOUTH NIGHTLY AS NEEDED.  AT BEDTIME 30 tablet 0   • ATORVASTATIN 20 MG Oral Tab TAKE 1 TABLET BY MOUTH EVERY DAY  90 tablet 0 BASE) MCG/ACT Inhalation Aero Soln Inhale 2 puffs into the lungs every 4 (four) hours as needed for Wheezing. 1 Inhaler 6   • ID NOW COVID-19 In Vitro Kit 1 strip by In Vitro route As Directed.        DM associated review of  symptoms:   Endocrine: Polyuria once daily (no need for increase since spikes are after meals)     Humalog with meals or 2 hour after meals: based on blood sugar   If blood sugar is 180-220: take 3 units   If blood sugar is 221-260, take 4 units   If blood sugar is 261-300, take 5 units

## 2021-05-12 ENCOUNTER — OFFICE VISIT (OUTPATIENT)
Dept: ENDOCRINOLOGY CLINIC | Facility: CLINIC | Age: 56
End: 2021-05-12
Payer: COMMERCIAL

## 2021-05-12 VITALS
HEART RATE: 74 BPM | BODY MASS INDEX: 40.18 KG/M2 | RESPIRATION RATE: 16 BRPM | WEIGHT: 241.19 LBS | DIASTOLIC BLOOD PRESSURE: 70 MMHG | OXYGEN SATURATION: 98 % | SYSTOLIC BLOOD PRESSURE: 118 MMHG | HEIGHT: 65 IN

## 2021-05-12 DIAGNOSIS — E66.01 CLASS 3 SEVERE OBESITY DUE TO EXCESS CALORIES WITH SERIOUS COMORBIDITY AND BODY MASS INDEX (BMI) OF 40.0 TO 44.9 IN ADULT (HCC): ICD-10-CM

## 2021-05-12 DIAGNOSIS — E11.65 TYPE 2 DIABETES MELLITUS WITH HYPERGLYCEMIA, WITH LONG-TERM CURRENT USE OF INSULIN (HCC): ICD-10-CM

## 2021-05-12 DIAGNOSIS — I10 HYPERTENSION, ESSENTIAL, BENIGN: Primary | ICD-10-CM

## 2021-05-12 DIAGNOSIS — E78.5 HYPERLIPIDEMIA LDL GOAL <100: ICD-10-CM

## 2021-05-12 DIAGNOSIS — Z79.4 TYPE 2 DIABETES MELLITUS WITH HYPERGLYCEMIA, WITH LONG-TERM CURRENT USE OF INSULIN (HCC): ICD-10-CM

## 2021-05-12 PROBLEM — E66.813 CLASS 3 SEVERE OBESITY DUE TO EXCESS CALORIES WITH SERIOUS COMORBIDITY AND BODY MASS INDEX (BMI) OF 40.0 TO 44.9 IN ADULT: Status: ACTIVE | Noted: 2021-05-12

## 2021-05-12 PROBLEM — E66.813 CLASS 3 SEVERE OBESITY DUE TO EXCESS CALORIES WITH SERIOUS COMORBIDITY AND BODY MASS INDEX (BMI) OF 40.0 TO 44.9 IN ADULT (HCC): Status: ACTIVE | Noted: 2021-05-12

## 2021-05-12 PROCEDURE — 3074F SYST BP LT 130 MM HG: CPT | Performed by: NURSE PRACTITIONER

## 2021-05-12 PROCEDURE — 83036 HEMOGLOBIN GLYCOSYLATED A1C: CPT | Performed by: NURSE PRACTITIONER

## 2021-05-12 PROCEDURE — 3008F BODY MASS INDEX DOCD: CPT | Performed by: NURSE PRACTITIONER

## 2021-05-12 PROCEDURE — 3052F HG A1C>EQUAL 8.0%<EQUAL 9.0%: CPT | Performed by: FAMILY MEDICINE

## 2021-05-12 PROCEDURE — 3078F DIAST BP <80 MM HG: CPT | Performed by: NURSE PRACTITIONER

## 2021-05-12 PROCEDURE — 99214 OFFICE O/P EST MOD 30 MIN: CPT | Performed by: NURSE PRACTITIONER

## 2021-05-12 RX ORDER — SEMAGLUTIDE 1.34 MG/ML
1 INJECTION, SOLUTION SUBCUTANEOUS WEEKLY
Qty: 9 ML | Refills: 1 | Status: SHIPPED | OUTPATIENT
Start: 2021-05-12 | End: 2021-07-09

## 2021-05-12 RX ORDER — COVID-19 MOLECULAR TEST ASSAY
1 KIT MISCELLANEOUS AS DIRECTED
COMMUNITY
Start: 2021-01-25 | End: 2021-05-12

## 2021-05-12 RX ORDER — INSULIN LISPRO 100 [IU]/ML
INJECTION, SOLUTION INTRAVENOUS; SUBCUTANEOUS
Qty: 1 PEN | Refills: 0 | COMMUNITY
Start: 2021-05-12 | End: 2021-11-24

## 2021-05-12 NOTE — PATIENT INSTRUCTIONS
Your A1C: 8.1%  This is too high for you and we will work together on lowering your blood sugars to help improve your health  Review the pump brochures; and even go online: Omni pod is tubeless and waterproof.  Let me know if want to pursue; as far as inves acceptable to reach your goal of improving diabetes    Watch for low blood sugars: (less than 70 )  Symptoms of low blood sugar:   · Shakiness or dizziness  · Cold, clammy skin or sweating  · Feeling hungry  · Headache  · Nervousness  · A hard, fast heartb labs, you will have 30 days to complete the  requested labs before future refills are authorized.    · In the event that your preferred pharmacy does not have the requested medication in stock (e.g. Backordered), it is your responsibility to find another ph

## 2021-06-02 ENCOUNTER — OFFICE VISIT (OUTPATIENT)
Dept: SURGERY | Facility: CLINIC | Age: 56
End: 2021-06-02
Payer: COMMERCIAL

## 2021-06-02 VITALS
DIASTOLIC BLOOD PRESSURE: 78 MMHG | BODY MASS INDEX: 39.15 KG/M2 | WEIGHT: 235 LBS | HEIGHT: 65 IN | SYSTOLIC BLOOD PRESSURE: 116 MMHG | TEMPERATURE: 97 F | HEART RATE: 73 BPM

## 2021-06-02 DIAGNOSIS — Z12.11 COLON CANCER SCREENING: Primary | ICD-10-CM

## 2021-06-02 PROCEDURE — S0285 CNSLT BEFORE SCREEN COLONOSC: HCPCS | Performed by: SURGERY

## 2021-06-02 PROCEDURE — 3074F SYST BP LT 130 MM HG: CPT | Performed by: SURGERY

## 2021-06-02 PROCEDURE — 3008F BODY MASS INDEX DOCD: CPT | Performed by: SURGERY

## 2021-06-02 PROCEDURE — 3078F DIAST BP <80 MM HG: CPT | Performed by: SURGERY

## 2021-06-02 RX ORDER — SODIUM, POTASSIUM,MAG SULFATES 17.5-3.13G
SOLUTION, RECONSTITUTED, ORAL ORAL
Qty: 177 ML | Refills: 0 | Status: SHIPPED | OUTPATIENT
Start: 2021-06-02 | End: 2021-12-13 | Stop reason: ALTCHOICE

## 2021-06-02 NOTE — H&P
New Patient Visit Note       Active Problems      No diagnosis found. Chief Complaint   Patient presents with:  Colonoscopy: NP - cscope consult (ref. by PCP) -- Pt here for first colonoscopy consult. States grandmother passed away from colon CA.  Denies Disorder Neg    • Thyroid disease Neg      Social History    Tobacco Use      Smoking status: Never Smoker      Smokeless tobacco: Never Used    Vaping Use      Vaping Use: Never used    Alcohol use:  Yes      Alcohol/week: 1.0 standard drinks      Types: 1 JAE LANCETS 79K Does not apply Misc, Test 2 x daily  Glucose Blood (ONETOUCH VERIO) In Vitro Strip, Test 2 times daily  Albuterol Sulfate HFA (PROAIR HFA) 108 (90 BASE) MCG/ACT Inhalation Aero Soln, Inhale 2 puffs into the lungs every 4 (four) hours as is alert and oriented to person, place, and time.    Psychiatric:         Behavior: Behavior normal.             Assessment   High risk for colon carcinoma based on mother with history of colon polyps patient with history of SVT currently taking metoprolol

## 2021-06-04 DIAGNOSIS — F41.9 ANXIETY: ICD-10-CM

## 2021-06-04 NOTE — TELEPHONE ENCOUNTER
Sent [employer] message stating pt is due for Px but was told to f/u in July. Alprazolam 0.5 mg  Filled 4-29-21  Qty 30  0 refills  No upcoming appt. LOV 1-7-21  RTC 6 mo.

## 2021-06-04 NOTE — TELEPHONE ENCOUNTER
Sent [employer] message stating pt is due for Px but was told to f/u in July. Sertraline HCL 50 mg  Filled 3-9-21  Qty 90  0 refills  No upcoming appt. LOV 1-7-21  RTC 6 mo.  For Px

## 2021-06-06 RX ORDER — ALPRAZOLAM 0.5 MG/1
0.5 TABLET ORAL NIGHTLY PRN
Qty: 30 TABLET | Refills: 0 | Status: SHIPPED | OUTPATIENT
Start: 2021-06-06 | End: 2021-07-09

## 2021-07-05 DIAGNOSIS — E03.9 HYPOTHYROIDISM (ACQUIRED): ICD-10-CM

## 2021-07-06 DIAGNOSIS — F41.9 ANXIETY: ICD-10-CM

## 2021-07-07 RX ORDER — ALPRAZOLAM 0.5 MG/1
TABLET ORAL
Qty: 30 TABLET | Refills: 0 | OUTPATIENT
Start: 2021-07-07

## 2021-07-07 RX ORDER — LEVOTHYROXINE SODIUM 0.15 MG/1
150 TABLET ORAL
Qty: 90 TABLET | Refills: 0 | OUTPATIENT
Start: 2021-07-07

## 2021-07-09 DIAGNOSIS — E11.65 TYPE 2 DIABETES MELLITUS WITH HYPERGLYCEMIA, WITH LONG-TERM CURRENT USE OF INSULIN (HCC): ICD-10-CM

## 2021-07-09 DIAGNOSIS — Z79.4 TYPE 2 DIABETES MELLITUS WITH HYPERGLYCEMIA, WITH LONG-TERM CURRENT USE OF INSULIN (HCC): ICD-10-CM

## 2021-07-09 DIAGNOSIS — F41.9 ANXIETY: ICD-10-CM

## 2021-07-09 DIAGNOSIS — E03.9 HYPOTHYROIDISM (ACQUIRED): ICD-10-CM

## 2021-07-09 RX ORDER — SEMAGLUTIDE 1.34 MG/ML
1 INJECTION, SOLUTION SUBCUTANEOUS WEEKLY
Qty: 9 ML | Refills: 1 | Status: SHIPPED | OUTPATIENT
Start: 2021-07-09 | End: 2021-09-21

## 2021-07-09 RX ORDER — ALPRAZOLAM 0.5 MG/1
0.5 TABLET ORAL NIGHTLY PRN
Qty: 30 TABLET | Refills: 0 | Status: SHIPPED | OUTPATIENT
Start: 2021-07-09 | End: 2021-08-13

## 2021-07-09 RX ORDER — LEVOTHYROXINE SODIUM 0.15 MG/1
150 TABLET ORAL
Qty: 90 TABLET | Refills: 0 | Status: SHIPPED | OUTPATIENT
Start: 2021-07-09 | End: 2022-01-03

## 2021-07-09 NOTE — TELEPHONE ENCOUNTER
Requested Prescriptions     Pending Prescriptions Disp Refills   • Semaglutide, 1 MG/DOSE, (OZEMPIC, 1 MG/DOSE,) 4 MG/3ML Subcutaneous Solution Pen-injector 9 mL 1     Sig: Inject 1 mg into the skin once a week.      FILLED- 5/12/21  LOV- 5/12/21  FOV- 8/18

## 2021-07-09 NOTE — TELEPHONE ENCOUNTER
Levothyroxine 150 mcg  Filled 4-22-21  Qty 90  0 refills  Upcoming appt.: 7-24-21  LOV 1-7-21  Labs: 1-14-21: Thyroid    Alprazolam   Filled 6-6-21  Qty 30  0 refills  Upcoming appt.: 7-24-21  LOV 1-7-21

## 2021-07-20 ENCOUNTER — TELEPHONE (OUTPATIENT)
Dept: SURGERY | Facility: CLINIC | Age: 56
End: 2021-07-20

## 2021-07-23 ENCOUNTER — ANESTHESIA (OUTPATIENT)
Dept: ENDOSCOPY | Facility: HOSPITAL | Age: 56
End: 2021-07-23
Payer: COMMERCIAL

## 2021-07-23 ENCOUNTER — HOSPITAL ENCOUNTER (OUTPATIENT)
Facility: HOSPITAL | Age: 56
Setting detail: HOSPITAL OUTPATIENT SURGERY
Discharge: HOME | End: 2021-07-23
Attending: SURGERY | Admitting: SURGERY
Payer: COMMERCIAL

## 2021-07-23 ENCOUNTER — ANESTHESIA EVENT (OUTPATIENT)
Dept: ENDOSCOPY | Facility: HOSPITAL | Age: 56
End: 2021-07-23
Payer: COMMERCIAL

## 2021-07-23 VITALS
OXYGEN SATURATION: 95 % | HEART RATE: 65 BPM | HEIGHT: 65 IN | TEMPERATURE: 97 F | DIASTOLIC BLOOD PRESSURE: 72 MMHG | WEIGHT: 235 LBS | SYSTOLIC BLOOD PRESSURE: 110 MMHG | RESPIRATION RATE: 18 BRPM | BODY MASS INDEX: 39.15 KG/M2

## 2021-07-23 DIAGNOSIS — Z12.11 COLON CANCER SCREENING: ICD-10-CM

## 2021-07-23 LAB
B-HCG UR QL: NEGATIVE
GLUCOSE BLD-MCNC: 130 MG/DL (ref 70–99)

## 2021-07-23 PROCEDURE — 81025 URINE PREGNANCY TEST: CPT

## 2021-07-23 PROCEDURE — 0DBN8ZX EXCISION OF SIGMOID COLON, VIA NATURAL OR ARTIFICIAL OPENING ENDOSCOPIC, DIAGNOSTIC: ICD-10-PCS | Performed by: SURGERY

## 2021-07-23 PROCEDURE — 82962 GLUCOSE BLOOD TEST: CPT

## 2021-07-23 PROCEDURE — 88305 TISSUE EXAM BY PATHOLOGIST: CPT | Performed by: SURGERY

## 2021-07-23 PROCEDURE — 3E0H8GC INTRODUCTION OF OTHER THERAPEUTIC SUBSTANCE INTO LOWER GI, VIA NATURAL OR ARTIFICIAL OPENING ENDOSCOPIC: ICD-10-PCS | Performed by: SURGERY

## 2021-07-23 RX ORDER — LIDOCAINE HYDROCHLORIDE 10 MG/ML
INJECTION, SOLUTION EPIDURAL; INFILTRATION; INTRACAUDAL; PERINEURAL AS NEEDED
Status: DISCONTINUED | OUTPATIENT
Start: 2021-07-23 | End: 2021-07-23 | Stop reason: SURG

## 2021-07-23 RX ORDER — NALOXONE HYDROCHLORIDE 0.4 MG/ML
80 INJECTION, SOLUTION INTRAMUSCULAR; INTRAVENOUS; SUBCUTANEOUS AS NEEDED
Status: DISCONTINUED | OUTPATIENT
Start: 2021-07-23 | End: 2021-07-23

## 2021-07-23 RX ORDER — DEXTROSE MONOHYDRATE 25 G/50ML
50 INJECTION, SOLUTION INTRAVENOUS
Status: DISCONTINUED | OUTPATIENT
Start: 2021-07-23 | End: 2021-07-23

## 2021-07-23 RX ORDER — SODIUM CHLORIDE, SODIUM LACTATE, POTASSIUM CHLORIDE, CALCIUM CHLORIDE 600; 310; 30; 20 MG/100ML; MG/100ML; MG/100ML; MG/100ML
INJECTION, SOLUTION INTRAVENOUS CONTINUOUS
Status: DISCONTINUED | OUTPATIENT
Start: 2021-07-23 | End: 2021-07-23

## 2021-07-23 RX ADMIN — LIDOCAINE HYDROCHLORIDE 50 MG: 10 INJECTION, SOLUTION EPIDURAL; INFILTRATION; INTRACAUDAL; PERINEURAL at 12:21:00

## 2021-07-23 NOTE — ANESTHESIA POSTPROCEDURE EVALUATION
[facility] Patient Status:  Hospital Outpatient Surgery   Age/Gender 50year old female MRN [id number]   Location [id number] [place] [address] 28 Attending Dinesh Phillips, [address] [facility] Day # 0 PCP MD Silvina Alfonso

## 2021-07-23 NOTE — ANESTHESIA PREPROCEDURE EVALUATION
PRE-OP EVALUATION    Patient Name: Derick Tineo    Admit Diagnosis: Colon cancer screening [Z12.11]    Pre-op Diagnosis: Colon cancer screening [Z12.11]    COLONOSCOPY    Anesthesia Procedure: COLONOSCOPY (N/A )    Surgeon(s) and Role:     * Estanislado Severin INJECT UP TO 30 UNITS INTO THE SKIN SUBCUTANEOUSLY ONCE DAILY AS DIRECTED, Disp: 15 mL, Rfl: 3  Fexofenadine HCl 180 MG Oral Tab, Take 1 tablet (180 mg total) by mouth daily. , Disp: 90 tablet, Rfl: 0  Metoprolol Succinate ER 25 MG Oral Tablet 24 Hr, Take 1 status: Never Smoker      Smokeless tobacco: Never Used    Alcohol use: Yes      Drug use: No     Available pre-op labs reviewed.                Airway      Mallampati: II  Mouth opening: >3 FB  TM distance: > 6 cm  Neck ROM: full Cardiovascular    [facility]

## 2021-07-23 NOTE — H&P
Jose Leslie is a 50year old female referred by Dr. Gustavo Chang for evaluation of colonoscopy.     The patient states that she was referred by her primary care physician for colonoscopy due to her age. She denies a previous colonoscopy.   She denies compla equivalent per week      Comment: rarely    Drug use: No     Semaglutide, 1 MG/DOSE, (OZEMPIC, 1 MG/DOSE,) 4 MG/3ML Subcutaneous Solution Pen-injector, Inject 1 mg into the skin once a week.   Insulin Lispro, 1 Unit Dial, (HUMALOG KWIKPEN) 100 UNIT/ML Subcu Wheezing.     No current facility-administered medications on file prior to visit. Review of Systems  The Review of Systems has been reviewed by me during today. Review of Systems   Constitutional: Negative for chills and fever.    HENT: Negative fo cardiologist is Dr. Lucia Choudhary colonoscopy discussed with patient risk of bleeding and bowel perforation with surgery to repair  No orders of the defined types were placed in this encounter.

## 2021-07-24 ENCOUNTER — OFFICE VISIT (OUTPATIENT)
Dept: INTERNAL MEDICINE CLINIC | Facility: CLINIC | Age: 56
End: 2021-07-24
Payer: COMMERCIAL

## 2021-07-24 ENCOUNTER — LAB ENCOUNTER (OUTPATIENT)
Dept: LAB | Age: 56
End: 2021-07-24
Attending: FAMILY MEDICINE
Payer: COMMERCIAL

## 2021-07-24 VITALS
RESPIRATION RATE: 16 BRPM | HEART RATE: 65 BPM | DIASTOLIC BLOOD PRESSURE: 72 MMHG | WEIGHT: 234 LBS | HEIGHT: 65 IN | OXYGEN SATURATION: 95 % | BODY MASS INDEX: 38.99 KG/M2 | TEMPERATURE: 97 F | SYSTOLIC BLOOD PRESSURE: 128 MMHG

## 2021-07-24 DIAGNOSIS — I10 HYPERTENSION, ESSENTIAL, BENIGN: ICD-10-CM

## 2021-07-24 DIAGNOSIS — E03.9 HYPOTHYROIDISM (ACQUIRED): Primary | ICD-10-CM

## 2021-07-24 DIAGNOSIS — E11.65 TYPE 2 DIABETES, UNCONTROLLED, WITH NEUROPATHY (HCC): ICD-10-CM

## 2021-07-24 DIAGNOSIS — E11.40 TYPE 2 DIABETES, UNCONTROLLED, WITH NEUROPATHY (HCC): ICD-10-CM

## 2021-07-24 DIAGNOSIS — E78.5 HYPERLIPIDEMIA LDL GOAL <100: ICD-10-CM

## 2021-07-24 DIAGNOSIS — F41.9 ANXIETY: ICD-10-CM

## 2021-07-24 DIAGNOSIS — E03.9 HYPOTHYROIDISM (ACQUIRED): ICD-10-CM

## 2021-07-24 LAB
ALBUMIN SERPL-MCNC: 3.7 G/DL (ref 3.4–5)
ALBUMIN/GLOB SERPL: 0.9 {RATIO} (ref 1–2)
ALP LIVER SERPL-CCNC: 113 U/L
ALT SERPL-CCNC: 38 U/L
ANION GAP SERPL CALC-SCNC: 4 MMOL/L (ref 0–18)
AST SERPL-CCNC: 18 U/L (ref 15–37)
BILIRUB SERPL-MCNC: 0.7 MG/DL (ref 0.1–2)
BUN BLD-MCNC: 16 MG/DL (ref 7–18)
BUN/CREAT SERPL: 20.8 (ref 10–20)
CALCIUM BLD-MCNC: 9.1 MG/DL (ref 8.5–10.1)
CHLORIDE SERPL-SCNC: 107 MMOL/L (ref 98–112)
CHOLEST SMN-MCNC: 140 MG/DL (ref ?–200)
CO2 SERPL-SCNC: 27 MMOL/L (ref 21–32)
CREAT BLD-MCNC: 0.77 MG/DL
EST. AVERAGE GLUCOSE BLD GHB EST-MCNC: 174 MG/DL (ref 68–126)
GLOBULIN PLAS-MCNC: 3.9 G/DL (ref 2.8–4.4)
GLUCOSE BLD-MCNC: 173 MG/DL (ref 70–99)
HBA1C MFR BLD HPLC: 7.7 % (ref ?–5.7)
HDLC SERPL-MCNC: 42 MG/DL (ref 40–59)
LDLC SERPL CALC-MCNC: 78 MG/DL (ref ?–100)
M PROTEIN MFR SERPL ELPH: 7.6 G/DL (ref 6.4–8.2)
NONHDLC SERPL-MCNC: 98 MG/DL (ref ?–130)
OSMOLALITY SERPL CALC.SUM OF ELEC: 291 MOSM/KG (ref 275–295)
PATIENT FASTING Y/N/NP: YES
PATIENT FASTING Y/N/NP: YES
POTASSIUM SERPL-SCNC: 3.9 MMOL/L (ref 3.5–5.1)
SODIUM SERPL-SCNC: 138 MMOL/L (ref 136–145)
TRIGL SERPL-MCNC: 108 MG/DL (ref 30–149)
TSI SER-ACNC: 2.21 MIU/ML (ref 0.36–3.74)
VLDLC SERPL CALC-MCNC: 17 MG/DL (ref 0–30)

## 2021-07-24 PROCEDURE — 3078F DIAST BP <80 MM HG: CPT | Performed by: FAMILY MEDICINE

## 2021-07-24 PROCEDURE — 99214 OFFICE O/P EST MOD 30 MIN: CPT | Performed by: FAMILY MEDICINE

## 2021-07-24 PROCEDURE — 80053 COMPREHEN METABOLIC PANEL: CPT

## 2021-07-24 PROCEDURE — 3008F BODY MASS INDEX DOCD: CPT | Performed by: FAMILY MEDICINE

## 2021-07-24 PROCEDURE — 80061 LIPID PANEL: CPT

## 2021-07-24 PROCEDURE — 84443 ASSAY THYROID STIM HORMONE: CPT

## 2021-07-24 PROCEDURE — 3074F SYST BP LT 130 MM HG: CPT | Performed by: FAMILY MEDICINE

## 2021-07-24 PROCEDURE — 83036 HEMOGLOBIN GLYCOSYLATED A1C: CPT

## 2021-07-24 PROCEDURE — 3051F HG A1C>EQUAL 7.0%<8.0%: CPT | Performed by: NURSE PRACTITIONER

## 2021-07-24 PROCEDURE — 36415 COLL VENOUS BLD VENIPUNCTURE: CPT

## 2021-07-24 RX ORDER — GLIPIZIDE 10 MG/1
20 TABLET, FILM COATED, EXTENDED RELEASE ORAL DAILY
Qty: 180 TABLET | Refills: 0 | Status: SHIPPED | OUTPATIENT
Start: 2021-07-24 | End: 2022-01-18

## 2021-07-24 RX ORDER — LISINOPRIL AND HYDROCHLOROTHIAZIDE 20; 12.5 MG/1; MG/1
1 TABLET ORAL DAILY
Qty: 90 TABLET | Refills: 0 | Status: SHIPPED | OUTPATIENT
Start: 2021-07-24

## 2021-07-24 RX ORDER — ATORVASTATIN CALCIUM 20 MG/1
20 TABLET, FILM COATED ORAL DAILY
Qty: 90 TABLET | Refills: 0 | Status: SHIPPED | OUTPATIENT
Start: 2021-07-24 | End: 2022-01-08

## 2021-07-24 RX ORDER — EMPAGLIFLOZIN 25 MG/1
1 TABLET, FILM COATED ORAL DAILY
Qty: 90 TABLET | Refills: 0 | Status: SHIPPED | OUTPATIENT
Start: 2021-07-24

## 2021-07-24 NOTE — PROGRESS NOTES
CHIEF COMPLAINT:     Patient presents with:  Medication Follow-Up      HPI:   Jose Leslie is a 50year old female   Patient presents for recheck of  Hypertension and hyperlipdemia.  Pt has been taking medications as instructed, no medication side effe 10/28/2020 8.8 (A)     HgbA1C (%)   Date Value   01/14/2021 8.0 (H)     Cholesterol, Total (mg/dL)   Date Value   01/14/2021 134   06/13/2020 125   09/28/2019 131     CHOLESTEROL, TOTAL (mg/dL)   Date Value   08/04/2012 146     CHOLESTEROL (mg/dL)   Date once a week.  9 mL 1   • SERTRALINE HCL 50 MG Oral Tab TAKE 1 TABLET BY MOUTH EVERY DAY 90 tablet 0   • Na Sulfate-K Sulfate-Mg Sulf (SUPREP BOWEL PREP KIT) 17.5-3.13-1.6 GM/177ML Oral Solution 1 suprep kit 177 mL 0   • Insulin Lispro, 1 Unit Dial, (HUMALOG 09/2020    not hospitalized, flu-like symptoms; lingering symptom: fogginess   • Hypothyroidism     Dx at age 38   • Type II or unspecified type diabetes mellitus without mention of complication, not stated as uncontrolled    • Visual impairment     lori A1C      Meds & Refills for this Visit:  Requested Prescriptions     Signed Prescriptions Disp Refills   • atorvastatin 20 MG Oral Tab 90 tablet 0     Sig: Take 1 tablet (20 mg total) by mouth daily.    • Lisinopril-hydroCHLOROthiazide 20-12.5 MG Oral Tab 9 daily.  Dispense: 90 tablet; Refill: 0  - LIPID PANEL; Future  - COMP METABOLIC PANEL (14); Future    5. Anxiety  - stable,continue the Zoloft daily and the xanax prn      The patient indicates understanding of these issues and agrees to the plan.   The pat

## 2021-07-24 NOTE — OPERATIVE REPORT
[facility]    PATIENT'S NAME: John Brown   ATTENDING PHYSICIAN: Cassidy Valencia D.O.   OPERATING PHYSICIAN: Cassidy Valencia D.O.   PATIENT ACCOUNT#:   [de-identified]    LOCATION:  ENDO [address] [place] [address] ENDO POOL ROOMS 13 EDW [address] #:   [id number] out using a hot snare. All pieces were retrieved and the ink tattooing was carried out at the polypectomy site.   The scope was then slowly withdrawn through the remainder of the sigmoid colon and rectum, demonstrating no further evidence of mucosal diseas

## 2021-07-26 RX ORDER — FEXOFENADINE HCL 180 MG/1
180 TABLET ORAL DAILY
Qty: 90 TABLET | Refills: 0 | Status: SHIPPED | OUTPATIENT
Start: 2021-07-26 | End: 2021-10-08

## 2021-07-26 NOTE — TELEPHONE ENCOUNTER
Called LVM letting pt know that the refill request for Allegra we sent it over to PCP Dr. Marycruz Lewis

## 2021-07-26 NOTE — TELEPHONE ENCOUNTER
Requested Prescriptions     Pending Prescriptions Disp Refills   • Fexofenadine HCl 180 MG Oral Tab 90 tablet 0     Sig: Take 1 tablet (180 mg total) by mouth daily.      FILLED- 06/17/21  LOV- 05/12/21  FOV- 08/18/21  LAST A1C- 7.7 (07/24/21)

## 2021-07-27 ENCOUNTER — TELEPHONE (OUTPATIENT)
Dept: SURGERY | Facility: CLINIC | Age: 56
End: 2021-07-27

## 2021-07-27 NOTE — TELEPHONE ENCOUNTER
----- Message from Jerrell Srinivasan DO sent at 7/27/2021  2:33 PM CDT -----  Call patient tell her pathology is benign repeat colonoscopy in 6 months due to polyp removed was large and removed in piecemeal fashion  Informed pt of this information, recall ent

## 2021-08-12 DIAGNOSIS — F41.9 ANXIETY: ICD-10-CM

## 2021-08-13 RX ORDER — ALPRAZOLAM 0.5 MG/1
TABLET ORAL
Qty: 30 TABLET | Refills: 0 | Status: SHIPPED | OUTPATIENT
Start: 2021-08-13 | End: 2021-09-10

## 2021-08-18 ENCOUNTER — OFFICE VISIT (OUTPATIENT)
Dept: ENDOCRINOLOGY CLINIC | Facility: CLINIC | Age: 56
End: 2021-08-18
Payer: COMMERCIAL

## 2021-08-18 ENCOUNTER — TELEPHONE (OUTPATIENT)
Dept: ENDOCRINOLOGY CLINIC | Facility: CLINIC | Age: 56
End: 2021-08-18

## 2021-08-18 VITALS
RESPIRATION RATE: 18 BRPM | WEIGHT: 238.38 LBS | DIASTOLIC BLOOD PRESSURE: 62 MMHG | BODY MASS INDEX: 39.72 KG/M2 | OXYGEN SATURATION: 97 % | SYSTOLIC BLOOD PRESSURE: 118 MMHG | HEART RATE: 74 BPM | HEIGHT: 65 IN

## 2021-08-18 DIAGNOSIS — Z79.4 TYPE 2 DIABETES MELLITUS WITH HYPERGLYCEMIA, WITH LONG-TERM CURRENT USE OF INSULIN (HCC): Primary | ICD-10-CM

## 2021-08-18 DIAGNOSIS — E78.5 HYPERLIPIDEMIA LDL GOAL <100: ICD-10-CM

## 2021-08-18 DIAGNOSIS — I10 HYPERTENSION, ESSENTIAL, BENIGN: ICD-10-CM

## 2021-08-18 DIAGNOSIS — E11.65 TYPE 2 DIABETES MELLITUS WITH HYPERGLYCEMIA, WITH LONG-TERM CURRENT USE OF INSULIN (HCC): Primary | ICD-10-CM

## 2021-08-18 PROCEDURE — 3078F DIAST BP <80 MM HG: CPT | Performed by: NURSE PRACTITIONER

## 2021-08-18 PROCEDURE — 3008F BODY MASS INDEX DOCD: CPT | Performed by: NURSE PRACTITIONER

## 2021-08-18 PROCEDURE — 99214 OFFICE O/P EST MOD 30 MIN: CPT | Performed by: NURSE PRACTITIONER

## 2021-08-18 PROCEDURE — 3074F SYST BP LT 130 MM HG: CPT | Performed by: NURSE PRACTITIONER

## 2021-08-18 PROCEDURE — 95251 CONT GLUC MNTR ANALYSIS I&R: CPT | Performed by: NURSE PRACTITIONER

## 2021-08-18 RX ORDER — INSULIN LISPRO 200 [IU]/ML
INJECTION, SOLUTION SUBCUTANEOUS
Qty: 1 EACH | Refills: 0 | COMMUNITY
Start: 2021-08-18 | End: 2021-10-08 | Stop reason: ALTCHOICE

## 2021-08-18 NOTE — PATIENT INSTRUCTIONS
We are here to help you manage your diabetes.  Please continue with your primary care physician/provider for your routine health care maintenance     Your A1C: 7.7% (last 8.1%)   This is a little better for you and we will continue to work together on lower less than  75 or more than  200.    Blood sugars greater than 200 are not acceptable to reach your goal of improving diabetes    Watch for low blood sugars: (less than 70 )  Symptoms of low blood sugar:   · Shakiness or dizziness  · Cold, clammy skin or swe for many medications to insure safe prescribing. If you are due for labs, you will have 30 days to complete the  requested labs before future refills are authorized.    · In the event that your preferred pharmacy does not have the requested medication in st

## 2021-08-18 NOTE — PROGRESS NOTES
Patient presents with:  Diabetes: F/U, dexcom streaming    Patient presents with:  Diabetes: F/U, dexcom streaming      Lynne Epperson is a 50year old female presenting with type 2 DM medication management. In the past 3m her Diabetes control has improved.  Today Lab Results   Component Value Date    CHOLEST 140 07/24/2021    TRIG 108 07/24/2021    HDL 42 07/24/2021    LDL 78 07/24/2021    MICROALBCREA 8.4 01/14/2021    CREATSERUM 0.77 07/24/2021    GFRNAA 88 07/24/2021    GFRAA 102 07/24/2021    AST 18 07/24/2 Medications   Medication Sig Dispense Refill   • ALPRAZOLAM 0.5 MG Oral Tab TAKE 1 TABLET BY MOUTH NIGHTLY AT BEDTIME AS NEEDED 30 tablet 0   • Fexofenadine HCl 180 MG Oral Tab Take 1 tablet (180 mg total) by mouth daily.  90 tablet 0   • atorvastatin 20 MG needed for Wheezing.  1 Inhaler 6   • Na Sulfate-K Sulfate-Mg Sulf (SUPREP BOWEL PREP KIT) 17.5-3.13-1.6 GM/177ML Oral Solution 1 suprep kit (Patient not taking: Reported on 8/18/2021 ) 177 mL 0     DM associated review of  symptoms:   Endocrine: Polyuria, Jardiance 25mg once daily  Glipizide XL 10mg : 1 tab twice daily     Continue site rotation  site selection/rotation for MDI therapy to avoid lipodystrophy     Reminded pt on A1C and blood sugar targets (Fasting < 130 and post prandial <425 ) and complic

## 2021-08-18 NOTE — TELEPHONE ENCOUNTER
Omnipod form filled out and signed by pt    Fax to Toshia Lombardo 226: [phone]  Fax confirmed    Copy sent to scan  Form placed in folder

## 2021-08-26 RX ORDER — PEN NEEDLE, DIABETIC 32GX 5/32"
NEEDLE, DISPOSABLE MISCELLANEOUS
Qty: 100 EACH | Refills: 3 | Status: SHIPPED | OUTPATIENT
Start: 2021-08-26

## 2021-08-26 RX ORDER — LANCETS 33 GAUGE
EACH MISCELLANEOUS
Qty: 100 EACH | Refills: 0 | Status: SHIPPED | OUTPATIENT
Start: 2021-08-26

## 2021-08-30 NOTE — TELEPHONE ENCOUNTER
Patient approved for Omni Pod- scheduled for training on 9/1/21 in [address] with Alex Muñoz - .

## 2021-09-01 ENCOUNTER — NURSE ONLY (OUTPATIENT)
Dept: ENDOCRINOLOGY CLINIC | Facility: CLINIC | Age: 56
End: 2021-09-01
Payer: COMMERCIAL

## 2021-09-01 RX ORDER — INSULIN LISPRO 100 [IU]/ML
INJECTION, SOLUTION INTRAVENOUS; SUBCUTANEOUS
Qty: 1 ML | Refills: 0 | Status: SHIPPED | COMMUNITY
Start: 2021-09-01 | End: 2021-10-08

## 2021-09-01 NOTE — PROGRESS NOTES
Pt here to start on Omni pod insulin pump w THERESE [place]     Pump settings:   Omni pod w humalog U 100   Basal rate: 12MN 0.65 u/hour   Bolus settings:   IC ratio : 1:10   ~will either use food diary for calculations or enter set carbs for

## 2021-09-09 DIAGNOSIS — F41.9 ANXIETY: ICD-10-CM

## 2021-09-10 RX ORDER — ALPRAZOLAM 0.5 MG/1
TABLET ORAL
Qty: 30 TABLET | Refills: 0 | Status: SHIPPED | OUTPATIENT
Start: 2021-09-10 | End: 2021-10-08

## 2021-09-15 ENCOUNTER — PATIENT MESSAGE (OUTPATIENT)
Dept: ENDOCRINOLOGY CLINIC | Facility: CLINIC | Age: 56
End: 2021-09-15

## 2021-09-16 ENCOUNTER — TELEMEDICINE (OUTPATIENT)
Dept: ENDOCRINOLOGY CLINIC | Facility: CLINIC | Age: 56
End: 2021-09-16

## 2021-09-16 DIAGNOSIS — E11.65 TYPE 2 DIABETES MELLITUS WITH HYPERGLYCEMIA, WITH LONG-TERM CURRENT USE OF INSULIN (HCC): Primary | ICD-10-CM

## 2021-09-16 DIAGNOSIS — Z79.4 TYPE 2 DIABETES MELLITUS WITH HYPERGLYCEMIA, WITH LONG-TERM CURRENT USE OF INSULIN (HCC): Primary | ICD-10-CM

## 2021-09-16 NOTE — PROGRESS NOTES
Pt was not aware of [employer]o so no data on pump. Dexcom clarity is streaming. No charge for this visit. Pt rescheduled for 2pm today to review pump and sensor data.    Juanita Crane MS RD LDN [facility]

## 2021-09-20 ENCOUNTER — TELEPHONE (OUTPATIENT)
Dept: ENDOCRINOLOGY CLINIC | Facility: CLINIC | Age: 56
End: 2021-09-20

## 2021-09-21 ENCOUNTER — PATIENT MESSAGE (OUTPATIENT)
Dept: ENDOCRINOLOGY CLINIC | Facility: CLINIC | Age: 56
End: 2021-09-21

## 2021-09-21 DIAGNOSIS — Z79.4 TYPE 2 DIABETES MELLITUS WITH HYPERGLYCEMIA, WITH LONG-TERM CURRENT USE OF INSULIN (HCC): ICD-10-CM

## 2021-09-21 DIAGNOSIS — E11.40 TYPE 2 DIABETES, UNCONTROLLED, WITH NEUROPATHY (HCC): ICD-10-CM

## 2021-09-21 DIAGNOSIS — E11.65 TYPE 2 DIABETES MELLITUS WITH HYPERGLYCEMIA, WITH LONG-TERM CURRENT USE OF INSULIN (HCC): ICD-10-CM

## 2021-09-21 DIAGNOSIS — E11.65 TYPE 2 DIABETES, UNCONTROLLED, WITH NEUROPATHY (HCC): ICD-10-CM

## 2021-09-21 RX ORDER — SEMAGLUTIDE 1.34 MG/ML
1 INJECTION, SOLUTION SUBCUTANEOUS WEEKLY
Qty: 9 ML | Refills: 1 | Status: SHIPPED | OUTPATIENT
Start: 2021-09-21

## 2021-09-21 NOTE — TELEPHONE ENCOUNTER
From: Xiomara Courser  To: FELY Baker  Sent: 9/21/2021 8:25 AM CDT  Subject: Omnipod     Good morning,     Long story short I have misplaced my Omnipod meter thing. I can’t find the phone number for the rep.  I also want to know why I got the o

## 2021-10-07 DIAGNOSIS — F41.9 ANXIETY: ICD-10-CM

## 2021-10-07 DIAGNOSIS — Z79.4 TYPE 2 DIABETES MELLITUS WITH HYPERGLYCEMIA, WITH LONG-TERM CURRENT USE OF INSULIN (HCC): ICD-10-CM

## 2021-10-07 DIAGNOSIS — E11.65 TYPE 2 DIABETES MELLITUS WITH HYPERGLYCEMIA, WITH LONG-TERM CURRENT USE OF INSULIN (HCC): ICD-10-CM

## 2021-10-08 ENCOUNTER — MOBILE ENCOUNTER (OUTPATIENT)
Dept: ENDOCRINOLOGY CLINIC | Facility: CLINIC | Age: 56
End: 2021-10-08

## 2021-10-08 RX ORDER — FEXOFENADINE HCL 180 MG/1
TABLET ORAL
Qty: 30 TABLET | Refills: 2 | Status: SHIPPED | OUTPATIENT
Start: 2021-10-08

## 2021-10-08 RX ORDER — INSULIN LISPRO 100 [IU]/ML
INJECTION, SOLUTION INTRAVENOUS; SUBCUTANEOUS
Qty: 50 ML | Refills: 1 | Status: SHIPPED | OUTPATIENT
Start: 2021-10-08 | End: 2021-11-24

## 2021-10-08 RX ORDER — INSULIN LISPRO 100 [IU]/ML
INJECTION, SOLUTION INTRAVENOUS; SUBCUTANEOUS
Qty: 30 ML | Refills: 0 | OUTPATIENT
Start: 2021-10-08

## 2021-10-08 RX ORDER — ALPRAZOLAM 0.5 MG/1
TABLET ORAL
Qty: 30 TABLET | Refills: 0 | Status: SHIPPED | OUTPATIENT
Start: 2021-10-08 | End: 2021-11-20

## 2021-10-08 NOTE — TELEPHONE ENCOUNTER
Requested Prescriptions     Pending Prescriptions Disp Refills   • Insulin Lispro, 1 Unit Dial, (HUMALOG KWIKPEN) 100 UNIT/ML Subcutaneous Solution Pen-injector [Pharmacy Med Name: HUMALOG 100 UNIT/ML KWIKPEN]  0     Sig: USES UP TO 30 UNITS DAILY AS [facility]

## 2021-10-08 NOTE — TELEPHONE ENCOUNTER
Name from pharmacy: ALPRAZOLAM 0.5 MG TABLET          Will file in chart as: ALPRAZOLAM 0.5 MG Oral Tab    Sig: TAKE 1 TABLET BY MOUTH NIGHTLY AT BEDTIME AS NEEDED    Disp:  30 tablet    Refills:  0    Start: 10/7/2021    Class: Normal    Non-formulary For

## 2021-10-10 ENCOUNTER — OFFICE VISIT (OUTPATIENT)
Dept: FAMILY MEDICINE CLINIC | Facility: CLINIC | Age: 56
End: 2021-10-10
Payer: COMMERCIAL

## 2021-10-10 VITALS
DIASTOLIC BLOOD PRESSURE: 70 MMHG | RESPIRATION RATE: 17 BRPM | TEMPERATURE: 98 F | WEIGHT: 238 LBS | BODY MASS INDEX: 39.65 KG/M2 | OXYGEN SATURATION: 98 % | HEIGHT: 65 IN | HEART RATE: 82 BPM | SYSTOLIC BLOOD PRESSURE: 114 MMHG

## 2021-10-10 DIAGNOSIS — J02.9 ACUTE VIRAL PHARYNGITIS: ICD-10-CM

## 2021-10-10 DIAGNOSIS — J02.9 SORE THROAT: Primary | ICD-10-CM

## 2021-10-10 PROCEDURE — 3074F SYST BP LT 130 MM HG: CPT | Performed by: NURSE PRACTITIONER

## 2021-10-10 PROCEDURE — 99213 OFFICE O/P EST LOW 20 MIN: CPT | Performed by: NURSE PRACTITIONER

## 2021-10-10 PROCEDURE — 87880 STREP A ASSAY W/OPTIC: CPT | Performed by: NURSE PRACTITIONER

## 2021-10-10 PROCEDURE — 87081 CULTURE SCREEN ONLY: CPT | Performed by: NURSE PRACTITIONER

## 2021-10-10 PROCEDURE — 3008F BODY MASS INDEX DOCD: CPT | Performed by: NURSE PRACTITIONER

## 2021-10-10 PROCEDURE — 3078F DIAST BP <80 MM HG: CPT | Performed by: NURSE PRACTITIONER

## 2021-10-10 RX ORDER — LIDOCAINE HYDROCHLORIDE 20 MG/ML
10 SOLUTION OROPHARYNGEAL AS NEEDED
Qty: 100 ML | Refills: 0 | Status: SHIPPED | OUTPATIENT
Start: 2021-10-10 | End: 2021-12-13 | Stop reason: ALTCHOICE

## 2021-10-10 NOTE — PROGRESS NOTES
CHIEF COMPLAINT:   Patient presents with:  Sore Throat: Sore throat, cough, hurts to swallow. Started Thursday.   Not getting any better - Entered by patient        HPI:   Divina Woodard is a 49year old female presents to clinic with complaint of sore (JARDIANCE) 25 MG Oral Tab Take 1 tablet by mouth daily.  90 tablet 0   • Levothyroxine Sodium 150 MCG Oral Tab Take 1 tablet (150 mcg total) by mouth before breakfast. 90 tablet 0   • Insulin Lispro, 1 Unit Dial, (HUMALOG KWIKPEN) 100 UNIT/ML Subcutaneous or rashes  HEENT: + bilateral ear pain, See HPI  RESPIRATORY: denies shortness of breath or wheezing  CARDIOVASCULAR: denies chest pain or palpitations   GI: denies vomiting or diarrhea  NEURO: denies dizziness or lightheadedness    EXAM:   /70   Pul Patient Instructions    Follow up in 3-5 days if not improving, condition worsens, or fever greater than or equal to 100.4 persists for 72 hours. Work note provided until covid test is resulted.     Patient Instructions     Self-Care for Sore Throats  Sore antibiotics won’t help you. Prevent future sore throats  Prevention tips include:  · Stop smoking or reduce contact with secondhand smoke. Smoke irritates the tender throat lining.   · Limit contact with pets and with allergy-causing substances, such as p prevent dehydration. · Adults, and children 5 years and older, can use throat lozenges or numbing throat sprays to help reduce pain. Gargling with warm salt water will also help reduce throat pain. Dissolve 1/2 teaspoon of salt in 1 glass of warm water.  C rash  · Other symptoms are getting worse  Call 911  Call 911 or get medical care right away if any of these occur:     · Trouble breathing or noisy breathing  · Muffled voice  · Can't swallow liquids, a lot of drooling, or any other symptoms that may mean provider how you should take the temperature.   · Rectal or forehead: 100.4°F (38°C) or higher  · Armpit: 99°F (37.2°C) or higher  Fever readings for a child age 3 months to 43 months (3 years):   · Rectal, forehead, or ear: 102°F (38.9°C) or higher  · Armp

## 2021-10-10 NOTE — PATIENT INSTRUCTIONS
Self-Care for Sore Throats  Sore throats happen for many reasons, such as colds, allergies, cigarette smoke, air pollution, and infections caused by viruses or bacteria. In any case, your throat becomes red and sore.  Your goal for self-care is to reduce allergy-causing substances, such as pollen and mold. · Wash your hands often when you’re around someone with a sore throat or cold. This will keep viruses or bacteria from spreading. · Limit outdoor time when air pollution is bad.   · Don’t strain your vo salt in 1 glass of warm water. Children can sip on juice or an ice pop. Children 5 years and older can also suck on a lollipop or hard candy.  (Hard candy and lozenges can be a choking hazard in children younger than 5 years.)  · Don’t eat salty or spicy fo symptoms that may mean worsening swelling in the throat  · Signs of dehydration such as very dark urine or no urine, sunken eyes, dizziness    Fever and children  Use a digital thermometer to check your child’s temperature. Don’t use a mercury thermometer. (38.9°C) or higher  · Armpit: 101°F (38.3°C) or higher  Call the healthcare provider in these cases:   · Repeated temperature of 104°F (40°C) or higher in a child of any age  · Fever of 100.4 or higher in baby younger than 3 months  · Fever that lasts more

## 2021-10-11 RX ORDER — INSULIN LISPRO 100 [IU]/ML
30 INJECTION, SOLUTION INTRAVENOUS; SUBCUTANEOUS DAILY
Qty: 30 ML | Refills: 0 | Status: SHIPPED | OUTPATIENT
Start: 2021-10-11

## 2021-10-30 ENCOUNTER — DIABETIC EDUCATION (OUTPATIENT)
Dept: ENDOCRINOLOGY CLINIC | Facility: CLINIC | Age: 56
End: 2021-10-30
Payer: COMMERCIAL

## 2021-10-30 DIAGNOSIS — Z79.4 TYPE 2 DIABETES MELLITUS WITH HYPERGLYCEMIA, WITH LONG-TERM CURRENT USE OF INSULIN (HCC): Primary | ICD-10-CM

## 2021-10-30 DIAGNOSIS — E11.65 TYPE 2 DIABETES MELLITUS WITH HYPERGLYCEMIA, WITH LONG-TERM CURRENT USE OF INSULIN (HCC): Primary | ICD-10-CM

## 2021-10-30 PROCEDURE — 97802 MEDICAL NUTRITION INDIV IN: CPT | Performed by: DIETITIAN, REGISTERED

## 2021-10-30 NOTE — PROGRESS NOTES
Liat First  9/27/1965 was seen for Continuous Glucose Sensor & Insulin Pump Follow-up Visit:    10/30/2021   Referring Provider: Sandra Salvador   Start time: 8:00 End time: 8:45    Pump/Sensor Type: Omnipod [place] and Dexcom    Reviewed CGMS download:

## 2021-11-08 ENCOUNTER — APPOINTMENT (OUTPATIENT)
Dept: CARDIOLOGY | Age: 56
End: 2021-11-08

## 2021-11-19 DIAGNOSIS — F41.9 ANXIETY: ICD-10-CM

## 2021-11-20 RX ORDER — ALPRAZOLAM 0.5 MG/1
TABLET ORAL
Qty: 30 TABLET | Refills: 0 | Status: SHIPPED | OUTPATIENT
Start: 2021-11-20 | End: 2022-01-03

## 2021-11-20 NOTE — TELEPHONE ENCOUNTER
Name from pharmacy: ALPRAZOLAM 0.5 MG TABLET          Will file in chart as: ALPRAZOLAM 0.5 MG Oral Tab    Sig: TAKE 1 TABLET BY MOUTH NIGHTLY AT BEDTIME AS NEEDED    Disp:  30 tablet    Refills:  0    Start: 11/19/2021    Class: Normal    Non-formulary Fo

## 2021-11-24 ENCOUNTER — OFFICE VISIT (OUTPATIENT)
Dept: ENDOCRINOLOGY CLINIC | Facility: CLINIC | Age: 56
End: 2021-11-24
Payer: COMMERCIAL

## 2021-11-24 VITALS
RESPIRATION RATE: 18 BRPM | SYSTOLIC BLOOD PRESSURE: 122 MMHG | OXYGEN SATURATION: 99 % | BODY MASS INDEX: 40 KG/M2 | WEIGHT: 239 LBS | HEART RATE: 71 BPM | DIASTOLIC BLOOD PRESSURE: 68 MMHG

## 2021-11-24 DIAGNOSIS — I10 HYPERTENSION, ESSENTIAL, BENIGN: ICD-10-CM

## 2021-11-24 DIAGNOSIS — E11.65 TYPE 2 DIABETES MELLITUS WITH HYPERGLYCEMIA, WITH LONG-TERM CURRENT USE OF INSULIN (HCC): Primary | ICD-10-CM

## 2021-11-24 DIAGNOSIS — Z79.4 TYPE 2 DIABETES MELLITUS WITH HYPERGLYCEMIA, WITH LONG-TERM CURRENT USE OF INSULIN (HCC): Primary | ICD-10-CM

## 2021-11-24 DIAGNOSIS — E78.5 HYPERLIPIDEMIA LDL GOAL <100: ICD-10-CM

## 2021-11-24 PROCEDURE — 3074F SYST BP LT 130 MM HG: CPT | Performed by: NURSE PRACTITIONER

## 2021-11-24 PROCEDURE — 95251 CONT GLUC MNTR ANALYSIS I&R: CPT | Performed by: NURSE PRACTITIONER

## 2021-11-24 PROCEDURE — 99214 OFFICE O/P EST MOD 30 MIN: CPT | Performed by: NURSE PRACTITIONER

## 2021-11-24 PROCEDURE — 3078F DIAST BP <80 MM HG: CPT | Performed by: NURSE PRACTITIONER

## 2021-11-24 NOTE — PATIENT INSTRUCTIONS
We are here to help you manage your diabetes.  Please continue with your primary care physician/provider for your routine health care maintenance       Your trends are higher and we will continue to work together on lowering your blood sugars to help improv eyesight  · Having nightmares or waking up confused or sweating  · Numbness or tingling in the lips or tongue    Treatment of Low Blood sugar Action Plan  1. Check blood glucose to be sure that it is low. You can’t always go by symptoms.  If in doubt, treat We will gladly send a new prescription to that pharmacy at your request.     Thank you   Brooke Church   [phone]

## 2021-11-24 NOTE — PROGRESS NOTES
Patient presents with:  Diabetes: pt streaming dexcom brought pump      Salomon Chakraborty is a 49year old female presenting with type 2 DM medication management. Most recent A1C: 7.7%      Has had some stressors:    Mom recently had covid but now home     Found pr Lab Results   Component Value Date    A1C 7.7 (H) 07/24/2021    A1C 8.1 (A) 05/12/2021    A1C 8.0 (H) 01/14/2021     (H) 07/24/2021        Lab Results   Component Value Date    CHOLEST 140 07/24/2021    TRIG 108 07/24/2021    HDL 42 07/24/2021 Grandmother    • Diabetes Brother         One brother with Type 2 DM   • Thyroid Disorder Neg    • Thyroid disease Neg      Current Outpatient Medications   Medication Sig Dispense Refill   • ALPRAZOLAM 0.5 MG Oral Tab TAKE 1 TABLET BY MOUTH NIGHTLY AT BED not apply Misc 3 injections daily 100 each 3   • Na Sulfate-K Sulfate-Mg Sulf (SUPREP BOWEL PREP KIT) 17.5-3.13-1.6 GM/177ML Oral Solution 1 suprep kit (Patient not taking: Reported on 8/18/2021 ) 177 mL 0   • Continuous Blood Gluc Sensor (DEXCOM G6 SENSOR 100   Basal rate:   12MN 0.65 u/hour --> 0. 75 u/hour  7am: 065 u/hour --> 0.8 u/hour     Bolus settings:   IC ratio : 1:10 -> 8     ISF: 40 --> 38   BG target 120 mg/dl (correct above 120 mg/dl)   4 hour active insulin   Max basal: 1.5 u/hour  Max bolus 2

## 2021-12-03 ENCOUNTER — ORDER TRANSCRIPTION (OUTPATIENT)
Dept: ADMINISTRATIVE | Facility: HOSPITAL | Age: 56
End: 2021-12-03

## 2021-12-03 DIAGNOSIS — Z12.31 ENCOUNTER FOR SCREENING MAMMOGRAM FOR MALIGNANT NEOPLASM OF BREAST: Primary | ICD-10-CM

## 2021-12-07 DIAGNOSIS — F41.9 ANXIETY: ICD-10-CM

## 2021-12-13 ENCOUNTER — TELEPHONE (OUTPATIENT)
Dept: INTERNAL MEDICINE CLINIC | Facility: CLINIC | Age: 56
End: 2021-12-13

## 2021-12-13 ENCOUNTER — OFFICE VISIT (OUTPATIENT)
Dept: INTERNAL MEDICINE CLINIC | Facility: CLINIC | Age: 56
End: 2021-12-13
Payer: COMMERCIAL

## 2021-12-13 ENCOUNTER — HOSPITAL ENCOUNTER (OUTPATIENT)
Dept: GENERAL RADIOLOGY | Age: 56
Discharge: HOME | End: 2021-12-13
Attending: FAMILY MEDICINE
Payer: COMMERCIAL

## 2021-12-13 VITALS
HEIGHT: 65 IN | RESPIRATION RATE: 16 BRPM | WEIGHT: 241 LBS | SYSTOLIC BLOOD PRESSURE: 110 MMHG | TEMPERATURE: 99 F | DIASTOLIC BLOOD PRESSURE: 66 MMHG | BODY MASS INDEX: 40.15 KG/M2 | HEART RATE: 76 BPM

## 2021-12-13 DIAGNOSIS — M25.532 LEFT WRIST PAIN: Primary | ICD-10-CM

## 2021-12-13 DIAGNOSIS — M25.532 LEFT WRIST PAIN: ICD-10-CM

## 2021-12-13 PROCEDURE — 73110 X-RAY EXAM OF WRIST: CPT | Performed by: FAMILY MEDICINE

## 2021-12-13 PROCEDURE — 3078F DIAST BP <80 MM HG: CPT | Performed by: FAMILY MEDICINE

## 2021-12-13 PROCEDURE — 99213 OFFICE O/P EST LOW 20 MIN: CPT | Performed by: FAMILY MEDICINE

## 2021-12-13 PROCEDURE — 3008F BODY MASS INDEX DOCD: CPT | Performed by: FAMILY MEDICINE

## 2021-12-13 PROCEDURE — 3074F SYST BP LT 130 MM HG: CPT | Performed by: FAMILY MEDICINE

## 2021-12-13 RX ORDER — METHYLPREDNISOLONE 4 MG/1
TABLET ORAL
Qty: 1 EACH | Refills: 0 | Status: SHIPPED | OUTPATIENT
Start: 2021-12-13

## 2021-12-13 NOTE — PROGRESS NOTES
CHIEF COMPLAINT:     Patient presents with:  Pain: 2-3 months, pt L hand/ wrist. ROM is bad, hurts. Pain comes and goes. HPI:   Baldev Edge is a 49year old female . The patient has complaints of left hand/wrist pain.  Pain started 2-3 months a DAILY AS DIRECTED 15 mL 3   • Continuous Blood Gluc Sensor (DEXCOM G6 SENSOR) Does not apply Misc 1 Device by Does not apply route Every 10 days.  3 each 12   • Continuous Blood Gluc Transmit (DEXCOM G6 TRANSMITTER) Does not apply Misc Use as directed with Sitting, Cuff Size: large)   Pulse 76   Temp 98.6 °F (37 °C) (Oral)   Resp 16   Ht 5' 5\" (1.651 m)   Wt 241 lb (109.3 kg)   LMP  (LMP Unknown)   BMI 40.10 kg/m²   GENERAL: well developed, well nourished,in no apparent distress  SKIN: no rashes,no suspicio

## 2021-12-13 NOTE — TELEPHONE ENCOUNTER
Pt was seen in office and brought in Eye Exam.  Entered DM eye exam in [facility]. Placed in [place] in basket.

## 2021-12-20 ENCOUNTER — PATIENT MESSAGE (OUTPATIENT)
Dept: SURGERY | Facility: CLINIC | Age: 56
End: 2021-12-20

## 2021-12-20 ENCOUNTER — TELEPHONE (OUTPATIENT)
Dept: ORTHOPEDICS CLINIC | Facility: CLINIC | Age: 56
End: 2021-12-20

## 2021-12-20 NOTE — TELEPHONE ENCOUNTER
Future Appointments   Date Time Provider Minerva Garnica   1/6/2022  2:00 PM Ankit Benz MD [facility]     Patient is coming for Radial Carpal LT Wrist. There was images done in Epic. Please advise if views are needed for this appt.  Than

## 2021-12-20 NOTE — TELEPHONE ENCOUNTER
PROCEDURE:  XR WRIST COMPLETE (MIN 3 VIEWS), LEFT   FINDINGS:   No fracture or dislocation is identified. Mild to moderate loss of radiocarpal joint space.  Minimal ulnar minus variance.    Moderate 1st carpometacarpal osteoarthritic changes.  Osteophytes

## 2022-01-02 DIAGNOSIS — E03.9 HYPOTHYROIDISM (ACQUIRED): ICD-10-CM

## 2022-01-02 DIAGNOSIS — F41.9 ANXIETY: ICD-10-CM

## 2022-01-03 RX ORDER — ALPRAZOLAM 0.5 MG/1
TABLET ORAL
Qty: 30 TABLET | Refills: 0 | Status: SHIPPED | OUTPATIENT
Start: 2022-01-03

## 2022-01-03 RX ORDER — LEVOTHYROXINE SODIUM 0.15 MG/1
150 TABLET ORAL
Qty: 90 TABLET | Refills: 0 | Status: SHIPPED | OUTPATIENT
Start: 2022-01-03

## 2022-01-06 ENCOUNTER — OFFICE VISIT (OUTPATIENT)
Dept: ORTHOPEDICS CLINIC | Facility: CLINIC | Age: 57
End: 2022-01-06
Payer: COMMERCIAL

## 2022-01-06 VITALS — HEART RATE: 75 BPM | OXYGEN SATURATION: 98 %

## 2022-01-06 DIAGNOSIS — M77.8 WRIST TENDONITIS: Primary | ICD-10-CM

## 2022-01-06 PROCEDURE — 99203 OFFICE O/P NEW LOW 30 MIN: CPT | Performed by: ORTHOPAEDIC SURGERY

## 2022-01-06 PROCEDURE — 20550 NJX 1 TENDON SHEATH/LIGAMENT: CPT | Performed by: ORTHOPAEDIC SURGERY

## 2022-01-06 RX ORDER — BETAMETHASONE SODIUM PHOSPHATE AND BETAMETHASONE ACETATE 3; 3 MG/ML; MG/ML
6 INJECTION, SUSPENSION INTRA-ARTICULAR; INTRALESIONAL; INTRAMUSCULAR; SOFT TISSUE ONCE
Status: COMPLETED | OUTPATIENT
Start: 2022-01-06 | End: 2022-01-06

## 2022-01-06 RX ORDER — TRIAMCINOLONE ACETONIDE 40 MG/ML
40 INJECTION, SUSPENSION INTRA-ARTICULAR; INTRAMUSCULAR ONCE
Status: DISCONTINUED | OUTPATIENT
Start: 2022-01-06 | End: 2022-01-06

## 2022-01-06 RX ADMIN — BETAMETHASONE SODIUM PHOSPHATE AND BETAMETHASONE ACETATE 6 MG: 3; 3 INJECTION, SUSPENSION INTRA-ARTICULAR; INTRALESIONAL; INTRAMUSCULAR; SOFT TISSUE at 14:25:00

## 2022-01-06 NOTE — PROCEDURES
Written consent was obtained. Skin was prepped with ChloraPrep. 1 mL of 6 mg of Betamethasone and 1 mL of 1% lidocaine was injected into the left ECU compartment. Patient tolerated the procedure. No complications were encountered.   Band-Aid was applied

## 2022-01-06 NOTE — H&P
Clinic Note [facility]     Assessment/Plan:  49year old female    1. Left wrist ECU tendinitis– continue bracing at nighttime and during the daytime. We discussed 3 to 4-week course of NSAIDs versus corticosteroid injection.   Patient wished to pro coronavirus disease (COVID-19) 09/2020    not hospitalized, flu-like symptoms; lingering symptom: fogginess   • Hypothyroidism     Dx at age 38   • Type II or unspecified type diabetes mellitus without mention of complication, not stated as uncontrolled daily.  90 tablet 0   • Insulin Degludec (TRESIBA FLEXTOUCH) 100 UNIT/ML Subcutaneous Solution Pen-injector INJECT UP TO 30 UNITS INTO THE SKIN SUBCUTANEOUSLY ONCE DAILY AS DIRECTED 15 mL 3   • Continuous Blood Gluc Sensor (DEXCOM G6 SENSOR) Does not apply [email or web address]  t: [id number]  f: [phone]

## 2022-01-08 ENCOUNTER — HOSPITAL ENCOUNTER (OUTPATIENT)
Dept: MAMMOGRAPHY | Age: 57
Discharge: HOME | End: 2022-01-08
Attending: FAMILY MEDICINE
Payer: COMMERCIAL

## 2022-01-08 DIAGNOSIS — E78.5 HYPERLIPIDEMIA LDL GOAL <100: ICD-10-CM

## 2022-01-08 DIAGNOSIS — Z12.31 ENCOUNTER FOR SCREENING MAMMOGRAM FOR MALIGNANT NEOPLASM OF BREAST: ICD-10-CM

## 2022-01-08 PROCEDURE — 77063 BREAST TOMOSYNTHESIS BI: CPT | Performed by: FAMILY MEDICINE

## 2022-01-08 PROCEDURE — 77067 SCR MAMMO BI INCL CAD: CPT | Performed by: FAMILY MEDICINE

## 2022-01-08 RX ORDER — ATORVASTATIN CALCIUM 20 MG/1
TABLET, FILM COATED ORAL
Qty: 90 TABLET | Refills: 0 | Status: SHIPPED | OUTPATIENT
Start: 2022-01-08

## 2022-01-08 NOTE — TELEPHONE ENCOUNTER
Name from pharmacy: Atorvastatin Calcium Oral Tablet 20 MG         Will file in chart as: ATORVASTATIN 20 MG Oral Tab    Sig: TAKE 1 TABLET BY MOUTH EVERY DAY    Disp:  90 tablet    Refills:  0    Start: 1/8/2022    Class: Normal    Non-formulary For: Hyp

## 2022-01-10 DIAGNOSIS — Z01.818 PRE-OP TESTING: Primary | ICD-10-CM

## 2022-01-13 NOTE — TELEPHONE ENCOUNTER
Diabetes Provider: pt is due for f/u this week or next, will send her MyChart reminder. Edis Villagomez is 10 year old 0 month old, here for a preventive care visit.    Assessment & Plan     (Z00.129) Encounter for routine child health examination w/o abnormal findings  (primary encounter diagnosis)  Comment:   Plan: BEHAVIORAL/EMOTIONAL ASSESSMENT (09314),         SCREENING TEST, PURE TONE, AIR ONLY, SCREENING,        VISUAL ACUITY, QUANTITATIVE, BILAT            (F90.1) Attention deficit hyperactivity disorder (ADHD), predominantly hyperactive type  (F41.9) Anxiety  Comment:   Plan: Follows with psychology regularly.  Considering adding medication for ADHD; discussed options today and mom will send a [employer] message if they are interested in trying this.    Growth        Normal height and weight    No weight concerns.    Immunizations     Vaccines up to date.      Anticipatory Guidance    Reviewed age appropriate anticipatory guidance.   The following topics were discussed:  SOCIAL/ FAMILY:    Encourage reading    Limit / supervise TV/ media    Chores/ expectations    Limits and consequences    Friends    Conflict resolution  NUTRITION:    Healthy snacks    Family meals    Calcium and iron sources    Balanced diet  HEALTH/ SAFETY:    Physical activity    Regular dental care    Booster seat/ Seat belts    Bike/sport helmets        Referrals/Ongoing Specialty Care  Ongoing care with psychology    Follow Up      Return in 1 year (on 1/13/2023) for Preventive Care visit.    Subjective     Additional Questions 1/13/2022   Do you have any questions today that you would like to discuss? Yes   Questions will discuss with provider   Has your child had a surgery, major illness or injury since the last physical exam? No     Patient has been advised of split billing requirements and indicates understanding: Yes        Social 1/13/2022   Who does your child live with? Parent(s), Step Parent(s)   Has your child experienced any stressful family events recently? (!) DIFFICULTIES BETWEEN PARENTS   In the past 12  months, has lack of transportation kept you from medical appointments or from getting medications? No   In the last 12 months, was there a time when you were not able to pay the mortgage or rent on time? No   In the last 12 months, was there a time when you did not have a steady place to sleep or slept in a shelter (including now)? No       Health Risks/Safety 1/13/2022   What type of car seat does your child use? Booster seat with seat belt   Where does your child sit in the car?  Back seat   Do you have a swimming pool? No   Is your child ever home alone?  No          TB Screening 1/13/2022   Since your last Well Child visit, have any of your child's family members or close contacts had tuberculosis or a positive tuberculosis test? No   Since your last Well Child Visit, has your child or any of their family members or close contacts traveled or lived outside of the [place]? No   Since your last Well Child visit, has your child lived in a high-risk group setting like a correctional facility, health care facility, homeless shelter, or refugee camp? No        Dyslipidemia Screening 1/13/2022   Have any of the child's parents or grandparents had a stroke or heart attack before age 55 for males or before age 65 for females? (!) YES   Do either of the child's parents have high cholesterol or are currently taking medications to treat cholesterol? No    Risk Factors: Family history of early cardiac disease (<55 years old in males or  <65 years old in females)      Dental Screening 1/13/2022   Has your child seen a dentist? Yes   When was the last visit? 3 months to 6 months ago   Has your child had cavities in the last 3 years? (!) YES, 3 OR MORE CAVITIES IN THE LAST 3 YEARS- HIGH RISK   Has your child s parent(s), caregiver, or sibling(s) had any cavities in the last 2 years?  No     Dental Fluoride Varnish:   No, parent/guardian declines fluoride varnish.  Diet 1/13/2022   Do you have questions about feeding your  child? No   What does your child regularly drink? Water, (!) SPORTS DRINKS   What type of water? Tap, (!) FILTERED   How often does your family eat meals together? Every day   How many snacks does your child eat per day 2   Are there types of foods your child won't eat? No   Does your child get at least 3 servings of food or beverages that have calcium each day (dairy, green leafy vegetables, etc)? Yes   Within the past 12 months, you worried that your food would run out before you got money to buy more. Never true   Within the past 12 months, the food you bought just didn't last and you didn't have money to get more. Never true     Elimination 1/13/2022   Do you have any concerns about your child's bladder or bowels? No concerns         Activity 1/13/2022   On average, how many days per week does your child engage in moderate to strenuous exercise (like walking fast, running, jogging, dancing, swimming, biking, or other activities that cause a light or heavy sweat)? (!) 5 DAYS   On average, how many minutes does your child engage in exercise at this level? 60 minutes   What does your child do for exercise?  Sports; play with friends siblings; recess   What activities is your child involved with?  Sports; [employer]     Media Use 1/13/2022   How many hours per day is your child viewing a screen for entertainment?    0-2 at moms more at dads but unknown   Does your child use a screen in their bedroom? No     Sleep 1/13/2022   Do you have any concerns about your child's sleep?  (!) BEDTIME STRUGGLES       Vision/Hearing 1/13/2022   Do you have any concerns about your child's hearing or vision?  No concerns     Vision Screen  Vision Screen Details  Does the patient have corrective lenses (glasses/contacts)?: No  No Corrective Lenses, PLUS LENS REQUIRED: Pass  Vision Acuity Screen  Vision Acuity Tool: Pawan  RIGHT EYE: 10/10 (20/20)  LEFT EYE: 10/10 (20/20)  Is there a two line difference?: No  Vision Screen Results:  "Pass    Hearing Screen  RIGHT EAR  1000 Hz on Level 40 dB (Conditioning sound): Pass  1000 Hz on Level 20 dB: Pass  2000 Hz on Level 20 dB: Pass  4000 Hz on Level 20 dB: Pass  LEFT EAR  4000 Hz on Level 20 dB: Pass  2000 Hz on Level 20 dB: Pass  1000 Hz on Level 20 dB: Pass  RIGHT EAR  500 Hz on Level 25 dB: Pass  Results  Hearing Screen Results: Pass      School 1/13/2022   Do you have any concerns about your child's learning in school? (!) OTHER   Please specify: Reading comprehension   What grade is your child in school? 2nd Grade   What school does your child attend? [employer]   Does your child typically miss more than 2 days of school per month? No   Do you have concerns about your child's friendships or peer relationships?  No       Development / Social-Emotional Screen 1/13/2022   Does your child receive any special educational services? (!) INDIVIDUAL EDUCATIONAL PROGRAM (IEP)     Mental Health - PSC-17 required for C&TC    Social-Emotional screening:   Electronic PSC   PSC SCORES 1/13/2022   Inattentive / Hyperactive Symptoms Subtotal 9 (At Risk)   Externalizing Symptoms Subtotal 7 (At Risk)   Internalizing Symptoms Subtotal 4   PSC - 17 Total Score 20 (Positive)       Follow up:  PSC-17 REFER (> 14), FOLLOW UP RECOMMENDED     ADHD symptoms               Objective     Exam  /64   Pulse 81   Temp 98.9  F (37.2  C) (Tympanic)   Resp 20   Ht 4' 4.36\" (1.33 m)   Wt 59 lb (26.8 kg)   SpO2 100%   BMI 15.13 kg/m    81 %ile (Z= 0.87) based on CDC (Boys, 2-20 Years) Stature-for-age data based on Stature recorded on 1/13/2022.  60 %ile (Z= 0.26) based on CDC (Boys, 2-20 Years) weight-for-age data using vitals from 1/13/2022.  33 %ile (Z= -0.44) based on CDC (Boys, 2-20 Years) BMI-for-age based on BMI available as of 1/13/2022.  Blood pressure percentiles are 91 % systolic and 73 % diastolic based on the 2017 AAP Clinical Practice Guideline. This reading is in the elevated blood pressure range (BP >= 90th " percentile).  Physical Exam  GENERAL: Active, alert, in no acute distress.  SKIN: Clear. No significant rash, abnormal pigmentation or lesions  HEAD: Normocephalic.  EYES:  Symmetric light reflex and no eye movement on cover/uncover test. Normal conjunctivae.  EARS: Normal canals. Tympanic membranes are normal; gray and translucent.  NOSE: Normal without discharge.  MOUTH/THROAT: Clear. No oral lesions. Teeth without obvious abnormalities.  NECK: Supple, no masses.  No thyromegaly.  LYMPH NODES: No adenopathy  LUNGS: Clear. No rales, rhonchi, wheezing or retractions  HEART: Regular rhythm. Normal S1/S2. No murmurs. Normal pulses.  ABDOMEN: Soft, non-tender, not distended, no masses or hepatosplenomegaly. Bowel sounds normal.   GENITALIA: Normal male external genitalia. Balta stage I,  both testes descended, no hernia or hydrocele.    EXTREMITIES: Full range of motion, no deformities  BACK:  Straight, no scoliosis.  NEUROLOGIC: No focal findings. Cranial nerves grossly intact: DTR's normal. Normal gait, strength and tone          ERIK Landa [facility]

## 2022-01-17 RX ORDER — SODIUM, POTASSIUM,MAG SULFATES 17.5-3.13G
SOLUTION, RECONSTITUTED, ORAL ORAL
Qty: 1 EACH | Refills: 0 | Status: SHIPPED | OUTPATIENT
Start: 2022-01-17

## 2022-01-18 RX ORDER — GLIPIZIDE 10 MG/1
TABLET, FILM COATED, EXTENDED RELEASE ORAL
Qty: 60 TABLET | Refills: 0 | Status: SHIPPED | OUTPATIENT
Start: 2022-01-18 | End: 2022-02-21

## 2022-01-18 NOTE — TELEPHONE ENCOUNTER
Glipizide ER 10 mg failed protocol due to   Diabetic Medication Protocol Failed 01/18/2022 12:29 AM   Protocol Details  Last HgBA1C < 7.5    Microalbumin procedure in past 12 months or taking ACE/ARB      Filled 7-24-21  Qty 180  0 refills  Upcoming appt. 3-2-22: Hari MATUTE 8-18-21: Carmeliat Perez

## 2022-01-24 ENCOUNTER — TELEPHONE (OUTPATIENT)
Dept: SURGERY | Facility: CLINIC | Age: 57
End: 2022-01-24

## 2022-01-25 ENCOUNTER — LAB ENCOUNTER (OUTPATIENT)
Dept: LAB | Age: 57
End: 2022-01-25
Attending: SURGERY
Payer: COMMERCIAL

## 2022-01-25 DIAGNOSIS — Z01.818 PRE-OP TESTING: ICD-10-CM

## 2022-01-26 LAB — SARS-COV-2 RNA RESP QL NAA+PROBE: NOT DETECTED

## 2022-01-28 ENCOUNTER — LAB REQUISITION (OUTPATIENT)
Dept: LAB | Facility: HOSPITAL | Age: 57
End: 2022-01-28
Payer: COMMERCIAL

## 2022-01-28 PROCEDURE — 88305 TISSUE EXAM BY PATHOLOGIST: CPT | Performed by: SURGERY

## 2022-02-12 DIAGNOSIS — IMO0002 TYPE 2 DIABETES, UNCONTROLLED, WITH NEUROPATHY: ICD-10-CM

## 2022-02-13 RX ORDER — EMPAGLIFLOZIN 25 MG/1
1 TABLET, FILM COATED ORAL DAILY
Qty: 30 TABLET | Refills: 0 | Status: SHIPPED | OUTPATIENT
Start: 2022-02-13 | End: 2022-03-10

## 2022-02-14 RX ORDER — ALPRAZOLAM 0.5 MG/1
TABLET ORAL
Qty: 30 TABLET | Refills: 0 | Status: SHIPPED | OUTPATIENT
Start: 2022-02-14

## 2022-02-21 RX ORDER — GLIPIZIDE 10 MG/1
TABLET, FILM COATED, EXTENDED RELEASE ORAL
Qty: 60 TABLET | Refills: 0 | Status: SHIPPED | OUTPATIENT
Start: 2022-02-21 | End: 2022-03-08

## 2022-02-21 NOTE — TELEPHONE ENCOUNTER
Sent [employer] message stating pt is due for CPX and fasting lab work. 1 month sent of medication for time being.      Glipizied ER 10 mg failed protocol due to  Diabetic Medication Protocol Failed 02/20/2022 08:16 AM   Protocol Details  HgBA1C procedure resulted in past 6 months    Last HgBA1C < 7.5    Microalbumin procedure in past 12 months or taking ACE/ARB   Filled 1-18-22  Qty 60  0 refills  Upcoming appt. 3-2-22 Alonna Mix  LOV 7-24-21

## 2022-02-28 ENCOUNTER — PATIENT MESSAGE (OUTPATIENT)
Dept: ENDOCRINOLOGY CLINIC | Facility: CLINIC | Age: 57
End: 2022-02-28

## 2022-02-28 NOTE — TELEPHONE ENCOUNTER
From: Loree Olivares  To: FELY Lemos  Sent: 2/28/2022 9:57 AM CST  Subject: Appt    I had to reschedule my appt we are having a staff shortage at my office this week.  It showed nothing for April so I grabbed a 7:30 in May

## 2022-03-03 ENCOUNTER — LAB ENCOUNTER (OUTPATIENT)
Dept: LAB | Age: 57
End: 2022-03-03
Attending: FAMILY MEDICINE
Payer: COMMERCIAL

## 2022-03-03 DIAGNOSIS — E78.5 HYPERLIPIDEMIA LDL GOAL <100: ICD-10-CM

## 2022-03-03 DIAGNOSIS — IMO0002 TYPE 2 DIABETES, UNCONTROLLED, WITH NEUROPATHY: ICD-10-CM

## 2022-03-03 LAB
ALBUMIN SERPL-MCNC: 3.9 G/DL (ref 3.4–5)
ALBUMIN/GLOB SERPL: 1.2 {RATIO} (ref 1–2)
ALP LIVER SERPL-CCNC: 129 U/L
ALT SERPL-CCNC: 34 U/L
ANION GAP SERPL CALC-SCNC: 5 MMOL/L (ref 0–18)
AST SERPL-CCNC: 18 U/L (ref 15–37)
BILIRUB SERPL-MCNC: 0.8 MG/DL (ref 0.1–2)
BUN BLD-MCNC: 14 MG/DL (ref 7–18)
CALCIUM BLD-MCNC: 9.3 MG/DL (ref 8.5–10.1)
CHLORIDE SERPL-SCNC: 103 MMOL/L (ref 98–112)
CHOLEST SERPL-MCNC: 133 MG/DL (ref ?–200)
CO2 SERPL-SCNC: 31 MMOL/L (ref 21–32)
CREAT BLD-MCNC: 0.77 MG/DL
CREAT UR-SCNC: 149 MG/DL
EST. AVERAGE GLUCOSE BLD GHB EST-MCNC: 197 MG/DL (ref 68–126)
FASTING PATIENT LIPID ANSWER: YES
FASTING STATUS PATIENT QL REPORTED: YES
GLOBULIN PLAS-MCNC: 3.3 G/DL (ref 2.8–4.4)
GLUCOSE BLD-MCNC: 194 MG/DL (ref 70–99)
HBA1C MFR BLD: 8.5 % (ref ?–5.7)
HDLC SERPL-MCNC: 41 MG/DL (ref 40–59)
LDLC SERPL CALC-MCNC: 67 MG/DL (ref ?–100)
MICROALBUMIN UR-MCNC: 4.67 MG/DL
MICROALBUMIN/CREAT 24H UR-RTO: 31.3 UG/MG (ref ?–30)
NONHDLC SERPL-MCNC: 92 MG/DL (ref ?–130)
OSMOLALITY SERPL CALC.SUM OF ELEC: 294 MOSM/KG (ref 275–295)
POTASSIUM SERPL-SCNC: 4.1 MMOL/L (ref 3.5–5.1)
PROT SERPL-MCNC: 7.2 G/DL (ref 6.4–8.2)
SODIUM SERPL-SCNC: 139 MMOL/L (ref 136–145)
TRIGL SERPL-MCNC: 145 MG/DL (ref 30–149)
VLDLC SERPL CALC-MCNC: 22 MG/DL (ref 0–30)

## 2022-03-03 PROCEDURE — 82043 UR ALBUMIN QUANTITATIVE: CPT

## 2022-03-03 PROCEDURE — 3061F NEG MICROALBUMINURIA REV: CPT | Performed by: FAMILY MEDICINE

## 2022-03-03 PROCEDURE — 83036 HEMOGLOBIN GLYCOSYLATED A1C: CPT

## 2022-03-03 PROCEDURE — 3060F POS MICROALBUMINURIA REV: CPT | Performed by: FAMILY MEDICINE

## 2022-03-03 PROCEDURE — 3052F HG A1C>EQUAL 8.0%<EQUAL 9.0%: CPT | Performed by: FAMILY MEDICINE

## 2022-03-03 PROCEDURE — 80061 LIPID PANEL: CPT

## 2022-03-03 PROCEDURE — 80053 COMPREHEN METABOLIC PANEL: CPT

## 2022-03-03 PROCEDURE — 36415 COLL VENOUS BLD VENIPUNCTURE: CPT

## 2022-03-03 PROCEDURE — 82570 ASSAY OF URINE CREATININE: CPT

## 2022-03-08 RX ORDER — GLIPIZIDE 10 MG/1
20 TABLET, FILM COATED, EXTENDED RELEASE ORAL DAILY
Qty: 180 TABLET | Refills: 0 | Status: SHIPPED | OUTPATIENT
Start: 2022-03-08

## 2022-03-08 NOTE — TELEPHONE ENCOUNTER
Glipizide ER 10 mg failed protocol due to  Diabetic Medication Protocol Failed 03/08/2022 09:18 AM   Protocol Details  Last HgBA1C < 7.5   Filled 2-21-22  Qty 60  0 refills  Upcoming appt.  [id number] Buitrago/ 4-2-22: Estella MATUTE 12-13-21  Lab: 3-3-22: A1C

## 2022-03-10 ENCOUNTER — TELEMEDICINE (OUTPATIENT)
Dept: ENDOCRINOLOGY CLINIC | Facility: CLINIC | Age: 57
End: 2022-03-10

## 2022-03-10 DIAGNOSIS — R80.9 POSITIVE FOR MICROALBUMINURIA: ICD-10-CM

## 2022-03-10 DIAGNOSIS — Z79.4 TYPE 2 DIABETES MELLITUS WITH HYPERGLYCEMIA, WITH LONG-TERM CURRENT USE OF INSULIN (HCC): Primary | ICD-10-CM

## 2022-03-10 DIAGNOSIS — E78.5 HYPERLIPIDEMIA LDL GOAL <100: ICD-10-CM

## 2022-03-10 DIAGNOSIS — E11.65 TYPE 2 DIABETES MELLITUS WITH HYPERGLYCEMIA, WITH LONG-TERM CURRENT USE OF INSULIN (HCC): Primary | ICD-10-CM

## 2022-03-10 DIAGNOSIS — Z96.41 INSULIN PUMP IN PLACE: ICD-10-CM

## 2022-03-10 DIAGNOSIS — I10 HYPERTENSION, ESSENTIAL, BENIGN: ICD-10-CM

## 2022-03-10 DIAGNOSIS — R53.83 FATIGUE, UNSPECIFIED TYPE: ICD-10-CM

## 2022-03-10 DIAGNOSIS — E55.9 VITAMIN D DEFICIENCY: ICD-10-CM

## 2022-03-10 PROCEDURE — 99214 OFFICE O/P EST MOD 30 MIN: CPT | Performed by: NURSE PRACTITIONER

## 2022-03-10 PROCEDURE — 95251 CONT GLUC MNTR ANALYSIS I&R: CPT | Performed by: NURSE PRACTITIONER

## 2022-03-10 RX ORDER — EMPAGLIFLOZIN 25 MG/1
1 TABLET, FILM COATED ORAL DAILY
Qty: 30 TABLET | Refills: 0 | Status: SHIPPED | OUTPATIENT
Start: 2022-03-10 | End: 2022-04-03

## 2022-03-10 RX ORDER — BLOOD-GLUCOSE TRANSMITTER
EACH MISCELLANEOUS
Qty: 1 EACH | Refills: 3 | Status: SHIPPED | OUTPATIENT
Start: 2022-03-10

## 2022-03-10 NOTE — TELEPHONE ENCOUNTER
Jardiance 25 mg failed protocol due to  Diabetic Medication Protocol Failed 03/10/2022 12:30 AM   Protocol Details  Last HgBA1C < 7.5   Filled 2-13-22  Qty 30  0 refills  Upcoming appt. 4-2-22 Estella/ 5-18-22: Abel MAUTTE 12-13-21  Labs: 3-3-22: A1c/ Microalb./Creat.

## 2022-03-14 ENCOUNTER — TELEPHONE (OUTPATIENT)
Dept: INTERNAL MEDICINE CLINIC | Facility: CLINIC | Age: 57
End: 2022-03-14

## 2022-03-14 RX ORDER — FEXOFENADINE HCL 180 MG/1
TABLET ORAL
Qty: 30 TABLET | Refills: 2 | Status: SHIPPED | OUTPATIENT
Start: 2022-03-14

## 2022-03-14 NOTE — TELEPHONE ENCOUNTER
----- Message from FELY Astorga sent at 3/5/2022  1:15 PM CST -----  Glucose 194 and Hgba1c up 8.5, Pt sees Olu Rubio for her DM. Pt to f/u with her as scheduled. Pt to continue all current medications. Micro-albumin up a little- Pt to continue her lisinopril hydrochlorothiazide to help protect her kidneys and to keep her BP under control. Alk phos up. Pt needs an abdominal ultrasound to check liver/gallbladder  Lipids look good.

## 2022-03-23 ENCOUNTER — LAB ENCOUNTER (OUTPATIENT)
Dept: LAB | Age: 57
End: 2022-03-23
Attending: NURSE PRACTITIONER
Payer: COMMERCIAL

## 2022-03-23 DIAGNOSIS — R53.83 FATIGUE, UNSPECIFIED TYPE: ICD-10-CM

## 2022-03-23 DIAGNOSIS — E55.9 VITAMIN D DEFICIENCY: ICD-10-CM

## 2022-03-23 LAB
BASOPHILS # BLD AUTO: 0.04 X10(3) UL (ref 0–0.2)
BASOPHILS NFR BLD AUTO: 0.4 %
EOSINOPHIL # BLD AUTO: 0.38 X10(3) UL (ref 0–0.7)
ERYTHROCYTE [DISTWIDTH] IN BLOOD BY AUTOMATED COUNT: 12.1 %
HCT VFR BLD AUTO: 44.7 %
HGB BLD-MCNC: 14.9 G/DL
IMM GRANULOCYTES # BLD AUTO: 0.03 X10(3) UL (ref 0–1)
IMM GRANULOCYTES NFR BLD: 0.3 %
LYMPHOCYTES # BLD AUTO: 2.59 X10(3) UL (ref 1–4)
LYMPHOCYTES NFR BLD AUTO: 25.9 %
MCH RBC QN AUTO: 30 PG (ref 26–34)
MCHC RBC AUTO-ENTMCNC: 33.3 G/DL (ref 31–37)
MCV RBC AUTO: 90.1 FL
MONOCYTES # BLD AUTO: 0.56 X10(3) UL (ref 0.1–1)
MONOCYTES NFR BLD AUTO: 5.6 %
NEUTROPHILS # BLD AUTO: 6.4 X10 (3) UL (ref 1.5–7.7)
NEUTROPHILS # BLD AUTO: 6.4 X10(3) UL (ref 1.5–7.7)
NEUTROPHILS NFR BLD AUTO: 64 %
PLATELET # BLD AUTO: 262 10(3)UL (ref 150–450)
RBC # BLD AUTO: 4.96 X10(6)UL
TSI SER-ACNC: 1.88 MIU/ML (ref 0.36–3.74)
VIT D+METAB SERPL-MCNC: 75.5 NG/ML (ref 30–100)
WBC # BLD AUTO: 10 X10(3) UL (ref 4–11)

## 2022-03-23 PROCEDURE — 36415 COLL VENOUS BLD VENIPUNCTURE: CPT

## 2022-03-23 PROCEDURE — 82306 VITAMIN D 25 HYDROXY: CPT

## 2022-03-23 PROCEDURE — 85025 COMPLETE CBC W/AUTO DIFF WBC: CPT

## 2022-03-23 PROCEDURE — 84443 ASSAY THYROID STIM HORMONE: CPT

## 2022-03-25 ENCOUNTER — OFFICE VISIT (OUTPATIENT)
Dept: ORTHOPEDICS CLINIC | Facility: CLINIC | Age: 57
End: 2022-03-25
Payer: COMMERCIAL

## 2022-03-25 VITALS — HEIGHT: 65 IN | WEIGHT: 235 LBS | BODY MASS INDEX: 39.15 KG/M2

## 2022-03-25 DIAGNOSIS — M24.132 DEGENERATIVE TFCC TEAR, LEFT: Primary | ICD-10-CM

## 2022-03-25 PROCEDURE — 99213 OFFICE O/P EST LOW 20 MIN: CPT | Performed by: ORTHOPAEDIC SURGERY

## 2022-03-25 PROCEDURE — 3008F BODY MASS INDEX DOCD: CPT | Performed by: ORTHOPAEDIC SURGERY

## 2022-03-25 PROCEDURE — 20605 DRAIN/INJ JOINT/BURSA W/O US: CPT | Performed by: ORTHOPAEDIC SURGERY

## 2022-03-25 RX ORDER — TRIAMCINOLONE ACETONIDE 40 MG/ML
40 INJECTION, SUSPENSION INTRA-ARTICULAR; INTRAMUSCULAR ONCE
Status: COMPLETED | OUTPATIENT
Start: 2022-03-25 | End: 2022-03-25

## 2022-03-25 RX ADMIN — TRIAMCINOLONE ACETONIDE 40 MG: 40 INJECTION, SUSPENSION INTRA-ARTICULAR; INTRAMUSCULAR at 08:45:00

## 2022-03-29 ENCOUNTER — PATIENT OUTREACH (OUTPATIENT)
Dept: SURGERY | Facility: CLINIC | Age: 57
End: 2022-03-29

## 2022-03-29 ENCOUNTER — HOSPITAL ENCOUNTER (OUTPATIENT)
Age: 57
Discharge: HOME | End: 2022-03-29
Attending: EMERGENCY MEDICINE
Payer: COMMERCIAL

## 2022-03-29 VITALS
OXYGEN SATURATION: 98 % | SYSTOLIC BLOOD PRESSURE: 143 MMHG | DIASTOLIC BLOOD PRESSURE: 78 MMHG | HEART RATE: 78 BPM | RESPIRATION RATE: 14 BRPM | TEMPERATURE: 97 F

## 2022-03-29 DIAGNOSIS — H65.92 MIDDLE EAR EFFUSION, LEFT: Primary | ICD-10-CM

## 2022-03-29 PROCEDURE — 99212 OFFICE O/P EST SF 10 MIN: CPT

## 2022-03-30 ENCOUNTER — PATIENT MESSAGE (OUTPATIENT)
Dept: ENDOCRINOLOGY CLINIC | Facility: CLINIC | Age: 57
End: 2022-03-30

## 2022-03-30 ENCOUNTER — NURSE ONLY (OUTPATIENT)
Dept: ENDOCRINOLOGY CLINIC | Facility: CLINIC | Age: 57
End: 2022-03-30
Payer: COMMERCIAL

## 2022-03-30 RX ORDER — BLOOD SUGAR DIAGNOSTIC
STRIP MISCELLANEOUS
Qty: 300 STRIP | Refills: 2 | Status: SHIPPED | OUTPATIENT
Start: 2022-03-30 | End: 2023-03-30

## 2022-03-30 RX ORDER — LANCETS 33 GAUGE
1 EACH MISCELLANEOUS AS DIRECTED
Qty: 100 EACH | Refills: 2 | Status: SHIPPED | OUTPATIENT
Start: 2022-03-30 | End: 2023-03-30

## 2022-03-30 NOTE — TELEPHONE ENCOUNTER
From: Pam Sandhu  To: FELY Barrientos  Sent: 3/30/2022 10:18 AM CDT  Subject: Meter and strips    Good morning do you happen to have any meters and some test strips? I lost mine and it will be a few weeks until I can purchase a new one.

## 2022-03-30 NOTE — TELEPHONE ENCOUNTER
From: Kellen Woods  To: FELY Burch  Sent: 3/30/2022 10:18 AM CDT  Subject: Meter and strips    Good morning do you happen to have any meters and some test strips? I lost mine and it will be a few weeks until I can purchase a new one.

## 2022-04-02 ENCOUNTER — OFFICE VISIT (OUTPATIENT)
Dept: INTERNAL MEDICINE CLINIC | Facility: CLINIC | Age: 57
End: 2022-04-02
Payer: COMMERCIAL

## 2022-04-02 VITALS
OXYGEN SATURATION: 97 % | DIASTOLIC BLOOD PRESSURE: 64 MMHG | SYSTOLIC BLOOD PRESSURE: 104 MMHG | RESPIRATION RATE: 20 BRPM | WEIGHT: 233.63 LBS | BODY MASS INDEX: 38.46 KG/M2 | TEMPERATURE: 98 F | HEIGHT: 65.5 IN | HEART RATE: 77 BPM

## 2022-04-02 DIAGNOSIS — R10.2 PELVIC PAIN IN FEMALE: ICD-10-CM

## 2022-04-02 DIAGNOSIS — G89.29 CHRONIC BILATERAL LOW BACK PAIN WITHOUT SCIATICA: ICD-10-CM

## 2022-04-02 DIAGNOSIS — M54.50 CHRONIC BILATERAL LOW BACK PAIN WITHOUT SCIATICA: ICD-10-CM

## 2022-04-02 DIAGNOSIS — Z00.00 ROUTINE GENERAL MEDICAL EXAMINATION AT A HEALTH CARE FACILITY: Primary | ICD-10-CM

## 2022-04-02 DIAGNOSIS — Z01.419 ENCOUNTER FOR GYNECOLOGICAL EXAMINATION WITH PAPANICOLAOU SMEAR OF CERVIX: ICD-10-CM

## 2022-04-02 DIAGNOSIS — Z23 NEED FOR SHINGLES VACCINE: ICD-10-CM

## 2022-04-02 DIAGNOSIS — E55.9 VITAMIN D DEFICIENCY: ICD-10-CM

## 2022-04-02 PROCEDURE — 99213 OFFICE O/P EST LOW 20 MIN: CPT | Performed by: FAMILY MEDICINE

## 2022-04-02 PROCEDURE — 90471 IMMUNIZATION ADMIN: CPT | Performed by: FAMILY MEDICINE

## 2022-04-02 PROCEDURE — 90750 HZV VACC RECOMBINANT IM: CPT | Performed by: FAMILY MEDICINE

## 2022-04-02 PROCEDURE — 87624 HPV HI-RISK TYP POOLED RSLT: CPT | Performed by: FAMILY MEDICINE

## 2022-04-02 PROCEDURE — 99396 PREV VISIT EST AGE 40-64: CPT | Performed by: FAMILY MEDICINE

## 2022-04-02 PROCEDURE — 3008F BODY MASS INDEX DOCD: CPT | Performed by: FAMILY MEDICINE

## 2022-04-02 PROCEDURE — 3078F DIAST BP <80 MM HG: CPT | Performed by: FAMILY MEDICINE

## 2022-04-02 PROCEDURE — 3074F SYST BP LT 130 MM HG: CPT | Performed by: FAMILY MEDICINE

## 2022-04-03 DIAGNOSIS — E11.40 TYPE 2 DIABETES MELLITUS WITH DIABETIC NEUROPATHY, UNSPECIFIED (HCC): ICD-10-CM

## 2022-04-03 RX ORDER — EMPAGLIFLOZIN 25 MG/1
1 TABLET, FILM COATED ORAL DAILY
Qty: 30 TABLET | Refills: 2 | Status: SHIPPED | OUTPATIENT
Start: 2022-04-03 | End: 2022-07-13

## 2022-04-05 ENCOUNTER — HOSPITAL ENCOUNTER (OUTPATIENT)
Dept: ULTRASOUND IMAGING | Age: 57
Discharge: HOME | End: 2022-04-05
Attending: FAMILY MEDICINE
Payer: COMMERCIAL

## 2022-04-05 ENCOUNTER — HOSPITAL ENCOUNTER (OUTPATIENT)
Dept: GENERAL RADIOLOGY | Age: 57
Discharge: HOME | End: 2022-04-05
Attending: FAMILY MEDICINE
Payer: COMMERCIAL

## 2022-04-05 DIAGNOSIS — R80.9 MICROALBUMINURIA: ICD-10-CM

## 2022-04-05 DIAGNOSIS — G89.29 CHRONIC BILATERAL LOW BACK PAIN WITHOUT SCIATICA: ICD-10-CM

## 2022-04-05 DIAGNOSIS — E11.65 TYPE 2 DIABETES MELLITUS WITH HYPERGLYCEMIA, WITH LONG-TERM CURRENT USE OF INSULIN (HCC): ICD-10-CM

## 2022-04-05 DIAGNOSIS — Z79.4 TYPE 2 DIABETES MELLITUS WITH HYPERGLYCEMIA, WITH LONG-TERM CURRENT USE OF INSULIN (HCC): ICD-10-CM

## 2022-04-05 DIAGNOSIS — M54.50 CHRONIC BILATERAL LOW BACK PAIN WITHOUT SCIATICA: ICD-10-CM

## 2022-04-05 PROCEDURE — 72110 X-RAY EXAM L-2 SPINE 4/>VWS: CPT | Performed by: FAMILY MEDICINE

## 2022-04-11 RX ORDER — ALPRAZOLAM 0.5 MG/1
TABLET ORAL
Qty: 30 TABLET | Refills: 0 | Status: SHIPPED | OUTPATIENT
Start: 2022-04-11

## 2022-04-12 LAB — HPV I/H RISK 1 DNA SPEC QL NAA+PROBE: NEGATIVE

## 2022-04-14 DIAGNOSIS — I10 HYPERTENSION, ESSENTIAL, BENIGN: ICD-10-CM

## 2022-04-14 RX ORDER — ATORVASTATIN CALCIUM 20 MG/1
TABLET, FILM COATED ORAL
Qty: 90 TABLET | Refills: 1 | Status: SHIPPED | OUTPATIENT
Start: 2022-04-14

## 2022-04-15 ENCOUNTER — OFFICE VISIT (OUTPATIENT)
Dept: SURGERY | Facility: CLINIC | Age: 57
End: 2022-04-15
Payer: COMMERCIAL

## 2022-04-15 VITALS
BODY MASS INDEX: 38.35 KG/M2 | SYSTOLIC BLOOD PRESSURE: 126 MMHG | WEIGHT: 233 LBS | DIASTOLIC BLOOD PRESSURE: 84 MMHG | HEIGHT: 65.5 IN

## 2022-04-15 DIAGNOSIS — M54.50 CHRONIC MIDLINE LOW BACK PAIN WITHOUT SCIATICA: ICD-10-CM

## 2022-04-15 DIAGNOSIS — G89.29 CHRONIC MIDLINE LOW BACK PAIN WITHOUT SCIATICA: ICD-10-CM

## 2022-04-15 DIAGNOSIS — M47.816 LUMBAR SPONDYLOSIS: Primary | ICD-10-CM

## 2022-04-15 PROCEDURE — 3008F BODY MASS INDEX DOCD: CPT | Performed by: PHYSICIAN ASSISTANT

## 2022-04-15 PROCEDURE — 3079F DIAST BP 80-89 MM HG: CPT | Performed by: PHYSICIAN ASSISTANT

## 2022-04-15 PROCEDURE — 99203 OFFICE O/P NEW LOW 30 MIN: CPT | Performed by: PHYSICIAN ASSISTANT

## 2022-04-15 PROCEDURE — 3074F SYST BP LT 130 MM HG: CPT | Performed by: PHYSICIAN ASSISTANT

## 2022-04-15 RX ORDER — LISINOPRIL AND HYDROCHLOROTHIAZIDE 20; 12.5 MG/1; MG/1
TABLET ORAL
Qty: 90 TABLET | Refills: 0 | Status: SHIPPED | OUTPATIENT
Start: 2022-04-15 | End: 2022-07-25

## 2022-04-15 NOTE — PATIENT INSTRUCTIONS
PLAN:  -Due to recent colon ulcers she is unable take NSAIDs.   Continue with Tylenol  -Physical therapy for core strengthening  -She develops any radicular symptoms we had an MRI of the lumbar spine  -If she is refractory to physical therapy then we will have her see physiatry  -Her questions were answered

## 2022-04-15 NOTE — PROGRESS NOTES
Ongoing for about a year  Seems to ignore it, but not it's getting worse    Did have a fall about 4-5 years ago in her basement, not sure that this has anything to do with it. Pain seems to be in the low back area, and did have some pain going across the back in the thoracic area.     Does work on concrete all day for second job

## 2022-04-20 ENCOUNTER — NURSE ONLY (OUTPATIENT)
Dept: ENDOCRINOLOGY CLINIC | Facility: CLINIC | Age: 57
End: 2022-04-20
Payer: COMMERCIAL

## 2022-04-21 ENCOUNTER — TELEMEDICINE (OUTPATIENT)
Dept: ENDOCRINOLOGY CLINIC | Facility: CLINIC | Age: 57
End: 2022-04-21

## 2022-04-21 DIAGNOSIS — E78.5 DYSLIPIDEMIA: ICD-10-CM

## 2022-04-21 DIAGNOSIS — Z79.4 TYPE 2 DIABETES MELLITUS WITH HYPERGLYCEMIA, WITH LONG-TERM CURRENT USE OF INSULIN (HCC): Primary | ICD-10-CM

## 2022-04-21 DIAGNOSIS — I10 ESSENTIAL HYPERTENSION: ICD-10-CM

## 2022-04-21 DIAGNOSIS — Z96.41 INSULIN PUMP IN PLACE: ICD-10-CM

## 2022-04-21 DIAGNOSIS — R80.9 POSITIVE FOR MICROALBUMINURIA: ICD-10-CM

## 2022-04-21 DIAGNOSIS — E11.65 TYPE 2 DIABETES MELLITUS WITH HYPERGLYCEMIA, WITH LONG-TERM CURRENT USE OF INSULIN (HCC): Primary | ICD-10-CM

## 2022-04-24 ENCOUNTER — HOSPITAL ENCOUNTER (OUTPATIENT)
Dept: ULTRASOUND IMAGING | Age: 57
End: 2022-04-24
Attending: FAMILY MEDICINE
Payer: COMMERCIAL

## 2022-04-24 ENCOUNTER — HOSPITAL ENCOUNTER (OUTPATIENT)
Dept: ULTRASOUND IMAGING | Age: 57
Discharge: HOME | End: 2022-04-24
Attending: FAMILY MEDICINE
Payer: COMMERCIAL

## 2022-04-24 DIAGNOSIS — R10.2 PELVIC PAIN IN FEMALE: ICD-10-CM

## 2022-04-24 PROCEDURE — 76856 US EXAM PELVIC COMPLETE: CPT | Performed by: FAMILY MEDICINE

## 2022-04-24 PROCEDURE — 76830 TRANSVAGINAL US NON-OB: CPT | Performed by: FAMILY MEDICINE

## 2022-04-24 PROCEDURE — 76700 US EXAM ABDOM COMPLETE: CPT | Performed by: FAMILY MEDICINE

## 2022-04-25 RX ORDER — INSULIN LISPRO 100 [IU]/ML
INJECTION, SOLUTION INTRAVENOUS; SUBCUTANEOUS
Qty: 50 ML | Refills: 1 | Status: SHIPPED | OUTPATIENT
Start: 2022-04-25

## 2022-05-18 ENCOUNTER — PATIENT MESSAGE (OUTPATIENT)
Dept: ENDOCRINOLOGY CLINIC | Facility: CLINIC | Age: 57
End: 2022-05-18

## 2022-05-18 ENCOUNTER — OFFICE VISIT (OUTPATIENT)
Dept: ENDOCRINOLOGY CLINIC | Facility: CLINIC | Age: 57
End: 2022-05-18
Payer: COMMERCIAL

## 2022-05-18 VITALS
HEART RATE: 84 BPM | OXYGEN SATURATION: 97 % | SYSTOLIC BLOOD PRESSURE: 122 MMHG | WEIGHT: 237.19 LBS | RESPIRATION RATE: 16 BRPM | DIASTOLIC BLOOD PRESSURE: 68 MMHG | BODY MASS INDEX: 39 KG/M2

## 2022-05-18 DIAGNOSIS — I10 ESSENTIAL HYPERTENSION: ICD-10-CM

## 2022-05-18 DIAGNOSIS — Z79.4 TYPE 2 DIABETES MELLITUS WITH HYPERGLYCEMIA, WITH LONG-TERM CURRENT USE OF INSULIN (HCC): Primary | ICD-10-CM

## 2022-05-18 DIAGNOSIS — E11.65 TYPE 2 DIABETES MELLITUS WITH HYPERGLYCEMIA, WITH LONG-TERM CURRENT USE OF INSULIN (HCC): Primary | ICD-10-CM

## 2022-05-18 DIAGNOSIS — Z96.41 INSULIN PUMP IN PLACE: ICD-10-CM

## 2022-05-18 DIAGNOSIS — E78.5 DYSLIPIDEMIA: ICD-10-CM

## 2022-05-18 PROBLEM — E66.01 MORBID (SEVERE) OBESITY DUE TO EXCESS CALORIES (HCC): Status: ACTIVE | Noted: 2022-05-18

## 2022-05-18 LAB — CARTRIDGE LOT#: 889 NUMERIC

## 2022-05-18 PROCEDURE — 3074F SYST BP LT 130 MM HG: CPT | Performed by: NURSE PRACTITIONER

## 2022-05-18 PROCEDURE — 95251 CONT GLUC MNTR ANALYSIS I&R: CPT | Performed by: NURSE PRACTITIONER

## 2022-05-18 PROCEDURE — 83036 HEMOGLOBIN GLYCOSYLATED A1C: CPT | Performed by: NURSE PRACTITIONER

## 2022-05-18 PROCEDURE — 3078F DIAST BP <80 MM HG: CPT | Performed by: NURSE PRACTITIONER

## 2022-05-18 PROCEDURE — 99214 OFFICE O/P EST MOD 30 MIN: CPT | Performed by: NURSE PRACTITIONER

## 2022-05-18 RX ORDER — BLOOD-GLUCOSE SENSOR
1 EACH MISCELLANEOUS
Qty: 9 EACH | Refills: 1 | Status: SHIPPED | OUTPATIENT
Start: 2022-05-18

## 2022-05-18 RX ORDER — SEMAGLUTIDE 2.68 MG/ML
2 INJECTION, SOLUTION SUBCUTANEOUS WEEKLY
Qty: 9 ML | Refills: 1 | Status: SHIPPED | OUTPATIENT
Start: 2022-05-18

## 2022-05-18 NOTE — TELEPHONE ENCOUNTER
Contacted [phone] per patient request.  They were unable to tell me much and suggested we contact [employer] to inquire for benefits and pharmacy info, etc.  Theora Session to send Omnipod orders to [facility] to determine coverage?

## 2022-05-18 NOTE — TELEPHONE ENCOUNTER
From: Monster Bee  To: FELY Martell  Sent: 5/18/2022 10:15 AM CDT  Subject: OMNIPOD 5    Hi Jeremi Dates, I called the number on my insurance and they said I need an Authorization on file from you. The number to call is [phone]. Let me know if you need anything else from me. I also see my Ozempic was denied. I will try and find out why.

## 2022-06-05 DIAGNOSIS — E11.40 TYPE 2 DIABETES MELLITUS WITH DIABETIC NEUROPATHY, UNSPECIFIED (HCC): ICD-10-CM

## 2022-06-06 DIAGNOSIS — F41.9 ANXIETY: ICD-10-CM

## 2022-06-06 DIAGNOSIS — E03.9 HYPOTHYROIDISM (ACQUIRED): ICD-10-CM

## 2022-06-06 RX ORDER — GLIPIZIDE 10 MG/1
TABLET, FILM COATED, EXTENDED RELEASE ORAL
Qty: 180 TABLET | Refills: 0 | Status: SHIPPED | OUTPATIENT
Start: 2022-06-06

## 2022-06-06 RX ORDER — ALPRAZOLAM 0.5 MG/1
TABLET ORAL
Qty: 30 TABLET | Refills: 0 | Status: SHIPPED | OUTPATIENT
Start: 2022-06-06

## 2022-06-06 RX ORDER — LEVOTHYROXINE SODIUM 0.15 MG/1
TABLET ORAL
Qty: 90 TABLET | Refills: 0 | Status: SHIPPED | OUTPATIENT
Start: 2022-06-06

## 2022-06-06 NOTE — TELEPHONE ENCOUNTER
Glipizide ER 10 mg   Diabetic Medication Protocol Failed 06/05/2022 07:38 AM   Protocol Details  Last HgBA1C < 7.5      Filled 3-8-22  Qty 180  0 refills  Upcoming appt. 7-27-22 appt.  Pascual MATUTE [id number] Tulio  Labs: 5-18-22: A1c

## 2022-06-09 ENCOUNTER — PATIENT MESSAGE (OUTPATIENT)
Dept: ENDOCRINOLOGY CLINIC | Facility: CLINIC | Age: 57
End: 2022-06-09

## 2022-06-21 DIAGNOSIS — F41.9 ANXIETY: ICD-10-CM

## 2022-07-11 RX ORDER — INSULIN PMP CART,AUT,G6/7,CNTR
1 EACH SUBCUTANEOUS AS NEEDED
Qty: 1 KIT | Refills: 0 | Status: SHIPPED | OUTPATIENT
Start: 2022-07-11

## 2022-07-11 NOTE — TELEPHONE ENCOUNTER
Orders Placed This Encounter      Insulin Disposable Pump (OMNIPOD 5 G6 INTRO, GEN 5,) Does not apply Kit          Si kit as needed. One pod every 3 days.           Dispense:  1 kit          Refill:  0        Omni pod 5 starter kit has been sent to pharmacy as requested

## 2022-07-13 DIAGNOSIS — E11.40 TYPE 2 DIABETES MELLITUS WITH DIABETIC NEUROPATHY, UNSPECIFIED (HCC): ICD-10-CM

## 2022-07-13 RX ORDER — EMPAGLIFLOZIN 25 MG/1
TABLET, FILM COATED ORAL
Qty: 30 TABLET | Refills: 2 | Status: SHIPPED | OUTPATIENT
Start: 2022-07-13

## 2022-07-14 ENCOUNTER — PATIENT MESSAGE (OUTPATIENT)
Dept: ENDOCRINOLOGY CLINIC | Facility: CLINIC | Age: 57
End: 2022-07-14

## 2022-07-18 NOTE — TELEPHONE ENCOUNTER
Call with pharmacy. Intro kit says \"drug not covered\". Per pharmacy, they will initiate if we send chart notes. Faxed last chart note to [phone].

## 2022-07-24 DIAGNOSIS — I10 HYPERTENSION, ESSENTIAL, BENIGN: ICD-10-CM

## 2022-07-24 DIAGNOSIS — F41.9 ANXIETY: ICD-10-CM

## 2022-07-25 ENCOUNTER — APPOINTMENT (OUTPATIENT)
Dept: GENERAL RADIOLOGY | Age: 57
End: 2022-07-25
Attending: NURSE PRACTITIONER
Payer: COMMERCIAL

## 2022-07-25 ENCOUNTER — HOSPITAL ENCOUNTER (OUTPATIENT)
Age: 57
Discharge: HOME | End: 2022-07-25
Payer: COMMERCIAL

## 2022-07-25 VITALS
HEART RATE: 74 BPM | OXYGEN SATURATION: 97 % | SYSTOLIC BLOOD PRESSURE: 112 MMHG | DIASTOLIC BLOOD PRESSURE: 67 MMHG | RESPIRATION RATE: 18 BRPM | BODY MASS INDEX: 38.32 KG/M2 | HEIGHT: 65 IN | TEMPERATURE: 98 F | WEIGHT: 230 LBS

## 2022-07-25 DIAGNOSIS — M43.6 TORTICOLLIS: Primary | ICD-10-CM

## 2022-07-25 PROCEDURE — 72050 X-RAY EXAM NECK SPINE 4/5VWS: CPT | Performed by: NURSE PRACTITIONER

## 2022-07-25 PROCEDURE — 99213 OFFICE O/P EST LOW 20 MIN: CPT

## 2022-07-25 RX ORDER — ALPRAZOLAM 0.5 MG/1
TABLET ORAL
Qty: 30 TABLET | Refills: 0 | Status: SHIPPED | OUTPATIENT
Start: 2022-07-25

## 2022-07-25 RX ORDER — CYCLOBENZAPRINE HCL 10 MG
10 TABLET ORAL 3 TIMES DAILY PRN
Qty: 20 TABLET | Refills: 0 | Status: SHIPPED | OUTPATIENT
Start: 2022-07-25 | End: 2022-08-01

## 2022-07-25 RX ORDER — LISINOPRIL AND HYDROCHLOROTHIAZIDE 20; 12.5 MG/1; MG/1
TABLET ORAL
Qty: 90 TABLET | Refills: 0 | Status: SHIPPED | OUTPATIENT
Start: 2022-07-25

## 2022-07-25 RX ORDER — TRAMADOL HYDROCHLORIDE 50 MG/1
TABLET ORAL EVERY 6 HOURS PRN
Qty: 10 TABLET | Refills: 0 | Status: SHIPPED | OUTPATIENT
Start: 2022-07-25 | End: 2022-07-30

## 2022-07-25 NOTE — ED INITIAL ASSESSMENT (HPI)
Pt aox4. Pt c/o left sided neck pain when woke up in the am 6 days ago. Pt states has limited ROM to neck. Pt has been taking OTC-Tyelnol without improvement.

## 2022-07-27 ENCOUNTER — OFFICE VISIT (OUTPATIENT)
Dept: ENDOCRINOLOGY CLINIC | Facility: CLINIC | Age: 57
End: 2022-07-27
Payer: COMMERCIAL

## 2022-07-27 VITALS
WEIGHT: 237.63 LBS | RESPIRATION RATE: 17 BRPM | SYSTOLIC BLOOD PRESSURE: 118 MMHG | HEART RATE: 75 BPM | BODY MASS INDEX: 40 KG/M2 | OXYGEN SATURATION: 95 % | DIASTOLIC BLOOD PRESSURE: 68 MMHG

## 2022-07-27 DIAGNOSIS — N28.9 NEPHROPATHY: ICD-10-CM

## 2022-07-27 DIAGNOSIS — Z79.4 TYPE 2 DIABETES MELLITUS WITH HYPERGLYCEMIA, WITH LONG-TERM CURRENT USE OF INSULIN (HCC): Primary | ICD-10-CM

## 2022-07-27 DIAGNOSIS — Z96.41 INSULIN PUMP IN PLACE: ICD-10-CM

## 2022-07-27 DIAGNOSIS — E11.65 TYPE 2 DIABETES MELLITUS WITH HYPERGLYCEMIA, WITH LONG-TERM CURRENT USE OF INSULIN (HCC): Primary | ICD-10-CM

## 2022-07-27 DIAGNOSIS — E78.5 DYSLIPIDEMIA: ICD-10-CM

## 2022-07-27 DIAGNOSIS — I10 ESSENTIAL HYPERTENSION: ICD-10-CM

## 2022-07-27 LAB
CARTRIDGE LOT#: 970 NUMERIC
HEMOGLOBIN A1C: 6.4 % (ref 4.3–5.6)

## 2022-07-27 PROCEDURE — 95251 CONT GLUC MNTR ANALYSIS I&R: CPT | Performed by: NURSE PRACTITIONER

## 2022-07-27 PROCEDURE — 83036 HEMOGLOBIN GLYCOSYLATED A1C: CPT | Performed by: NURSE PRACTITIONER

## 2022-07-27 PROCEDURE — 3044F HG A1C LEVEL LT 7.0%: CPT | Performed by: FAMILY MEDICINE

## 2022-07-27 PROCEDURE — 3074F SYST BP LT 130 MM HG: CPT | Performed by: NURSE PRACTITIONER

## 2022-07-27 PROCEDURE — 99214 OFFICE O/P EST MOD 30 MIN: CPT | Performed by: NURSE PRACTITIONER

## 2022-07-27 PROCEDURE — 3078F DIAST BP <80 MM HG: CPT | Performed by: NURSE PRACTITIONER

## 2022-07-27 RX ORDER — TIRZEPATIDE 2.5 MG/.5ML
1 INJECTION, SOLUTION SUBCUTANEOUS AS DIRECTED
Qty: 0.5 ML | Refills: 0 | COMMUNITY
Start: 2022-07-27

## 2022-08-15 ENCOUNTER — PATIENT MESSAGE (OUTPATIENT)
Dept: INTERNAL MEDICINE CLINIC | Facility: CLINIC | Age: 57
End: 2022-08-15

## 2022-08-16 DIAGNOSIS — E11.40 TYPE 2 DIABETES MELLITUS WITH DIABETIC NEUROPATHY, UNSPECIFIED (HCC): ICD-10-CM

## 2022-08-16 RX ORDER — GLIPIZIDE 10 MG/1
TABLET, FILM COATED, EXTENDED RELEASE ORAL
Qty: 60 TABLET | Refills: 2 | Status: SHIPPED | OUTPATIENT
Start: 2022-08-16

## 2022-08-16 NOTE — TELEPHONE ENCOUNTER
Glipizide ER 10 mg  Filled 6-6-22  Qty 180  0 refills  Upcoming appt.  1-25-23 Hughes Bride  LOV 7-27-22 Olu Rubio and 4-2-22   Labs: 7-27-22 A1c

## 2022-08-16 NOTE — TELEPHONE ENCOUNTER
SM - patient had lab draw 3/3/22, also has outstanding orders dated 4/2/22. Is patient to have repeat testing or are these duplicate orders ? Patient arrived fasting for 4/2/22 cpx appointment.

## 2022-08-31 DIAGNOSIS — F41.9 ANXIETY: ICD-10-CM

## 2022-08-31 RX ORDER — ALPRAZOLAM 0.5 MG/1
TABLET ORAL
Qty: 30 TABLET | Refills: 0 | Status: SHIPPED | OUTPATIENT
Start: 2022-08-31

## 2022-09-04 ENCOUNTER — PATIENT MESSAGE (OUTPATIENT)
Dept: ENDOCRINOLOGY CLINIC | Facility: CLINIC | Age: 57
End: 2022-09-04

## 2022-09-05 ENCOUNTER — HOSPITAL ENCOUNTER (OUTPATIENT)
Age: 57
Discharge: HOME | End: 2022-09-05
Payer: COMMERCIAL

## 2022-09-05 VITALS
SYSTOLIC BLOOD PRESSURE: 124 MMHG | TEMPERATURE: 98 F | BODY MASS INDEX: 36.96 KG/M2 | OXYGEN SATURATION: 96 % | HEART RATE: 98 BPM | DIASTOLIC BLOOD PRESSURE: 72 MMHG | HEIGHT: 66 IN | WEIGHT: 230 LBS | RESPIRATION RATE: 18 BRPM

## 2022-09-05 DIAGNOSIS — U07.1 COVID-19: Primary | ICD-10-CM

## 2022-09-05 LAB — SARS-COV-2 RNA RESP QL NAA+PROBE: DETECTED

## 2022-09-05 PROCEDURE — 99214 OFFICE O/P EST MOD 30 MIN: CPT

## 2022-09-05 PROCEDURE — 99213 OFFICE O/P EST LOW 20 MIN: CPT

## 2022-09-05 RX ORDER — NIRMATRELVIR AND RITONAVIR 300-100 MG
KIT ORAL
Qty: 30 TABLET | Refills: 0 | Status: SHIPPED | OUTPATIENT
Start: 2022-09-05 | End: 2022-09-10

## 2022-09-05 NOTE — ED INITIAL ASSESSMENT (HPI)
Pt requesting covid test . Pt states her  +covid. Symptoms sore throat, chills, bodyaches started yesterday.  Pt is  vaccinated

## 2022-09-06 ENCOUNTER — PATIENT MESSAGE (OUTPATIENT)
Dept: ENDOCRINOLOGY CLINIC | Facility: CLINIC | Age: 57
End: 2022-09-06

## 2022-09-12 DIAGNOSIS — E03.9 HYPOTHYROIDISM (ACQUIRED): ICD-10-CM

## 2022-09-12 RX ORDER — LEVOTHYROXINE SODIUM 0.15 MG/1
TABLET ORAL
Qty: 90 TABLET | Refills: 0 | Status: SHIPPED | OUTPATIENT
Start: 2022-09-12

## 2022-09-13 ENCOUNTER — TELEPHONE (OUTPATIENT)
Dept: PHYSICAL THERAPY | Facility: HOSPITAL | Age: 57
End: 2022-09-13

## 2022-09-14 ENCOUNTER — OFFICE VISIT (OUTPATIENT)
Dept: PHYSICAL THERAPY | Age: 57
End: 2022-09-14
Attending: PHYSICIAN ASSISTANT
Payer: COMMERCIAL

## 2022-09-14 ENCOUNTER — TELEPHONE (OUTPATIENT)
Dept: PHYSICAL THERAPY | Facility: HOSPITAL | Age: 57
End: 2022-09-14

## 2022-09-14 DIAGNOSIS — M47.816 LUMBAR SPONDYLOSIS: ICD-10-CM

## 2022-09-14 DIAGNOSIS — M54.50 CHRONIC MIDLINE LOW BACK PAIN WITHOUT SCIATICA: ICD-10-CM

## 2022-09-14 DIAGNOSIS — G89.29 CHRONIC MIDLINE LOW BACK PAIN WITHOUT SCIATICA: ICD-10-CM

## 2022-09-14 PROCEDURE — 97161 PT EVAL LOW COMPLEX 20 MIN: CPT

## 2022-09-14 PROCEDURE — 97110 THERAPEUTIC EXERCISES: CPT

## 2022-09-14 PROCEDURE — 97112 NEUROMUSCULAR REEDUCATION: CPT

## 2022-09-14 NOTE — PROGRESS NOTES
PHYSICAL THERAPY INITIAL EVALUATION     Date of service: 9/14/2022  Dx: Lumbar spondylosis (M47.816), Chronic midline low back pain without sciatica (M54.50,G89.29)  Insurance: [facility]  Insurance Limits: 20 visit limit  Visit #: 1  Authorized # of Visits: N/A  POC/Auth Expiration: N/A  Authorizing Physician/Provider: Alfredito     PATIENT SUMMARY     History/DAISY: \"I went and saw Dr. Robert Cancino and he had x-rays taken. He told me I had a compressions fracture and some degeneration of the fibers. He told me that it would take three years but that I should work on my core. I could be standing and I could be fine and then out of nowhere, the pain starts. It could happen with sitting or walking. \" Pain is worse towards the end of the day. DOI/S: chronic insidious onset    Aggravating Factors: episodic pain with various activities, bending forward, sitting in reclined chair    Alleviating Factors: massage chair, heating pad kept at work    PMH: The patient's PMH was reviewed with the patient including allergies, medications, and surgical and medical history. The patient denies any other back issues. The patient went to urgent care for neck pain which was diagnosed as a pulled muscle, but still has residual symptoms. PLF/Personal Goals: Improve pain     Occupation: , deskwork, computer work, phone calls. OBJECTIVE:     Pain/Symptom Presentation: Patient denies any paresthesias. The patient reports central low back pain, moreso on the left side than the right. Pain at rest: 0/10  Pain at worst: 8-9/10    Outcomes Measure(s): FOTO: 52.7400/100    Activity Measures:  Sleeping: No Activity Limitation: Patient is able to sleep without limitations due to LBP. Sitting: Mild Activity Limitation: Patient is able to sit for 2 hours before onset of symptoms. Bending Forward: Mild Activity Limitation: Patient is with mobility deficits and pain with forward bending.    Standing/Walking After Prolonged Sitting: Mild Activity Limitation: Patient reports stiffness takes 20-30 minutes. Standing: Moderate Activity Limitation: Patient is able to stand 1 hour before onset of symptoms. Walking: Mild Activity Limitation: Patient is able to walk 1 mile before onset of symptoms. Driving: No Activity Limitation: Patient is able to drive without limitations. Pushing: Mild Activity Limitation: Patient is able to push/pull light objects < 25lbs. Pulling: Mild Activity Limitation: Patient is able to push/pull light objects < 25lbs. Lifting Light Objects: No Activity Limitation: Patient is able to lift light objects up to 20lbs. Lifting Heavy Objects: Moderate Activity Limitation: Patient is able to lift objects up to 20lbs. Inspection/Observation: Patient demonstrates postural deficits with protracted shoulders and scapular and abdominal tone deficits. Palpation: Patient demonstrates deficits in hip passive mobility and lumbar and thoracic spine accessory joint mobility. Sensation: Patient denies any paresthesias or radicular symptoms. ROM:   Lumbar Motion AROM    Right Left   Lumbar Flexion 15.5cm   Lumbar Extension Mild mobility deficits   *indicates activity was associated with pain    Hip Motion PROM AROM    Right Left Right Left   Hip Flexion [facility] WFL N/A N/A   Hip Extension 10 10 N/A N/A   Hip ABD [facility] WFL N/A N/A   Hip ER (at 90deg hip flex) 55 55* N/A N/A   Hip IR (at 90deg hip flex) 20 20* N/A N/A   *indicates activity was associated with pain    Strength/MMT:  Hip Motion Strength    Right Left   Hip Flexion 4/5 4/5   Hip Extension 4-/5 4-/5   Hip ABD 4-/5 4-/5   Hip ER 4/5 4/5   Hip IR  4/5 4/5   *indicates activity was associated with pain       Knee Motion Strength    Right Left   Knee Extension 4/5 4/5   Knee Flexion 4/5 4/5   *indicates activity was associated with pain    Special Tests:   Low Back Special Tests:   Forward spinal flexion: (+)  Spinal extension: (+)  Slump test:(R) (-), (L) (-)  SLR: (R) (+), (L) (-)    ASSESSMENT:     Louise Simeon is a 49year old female that presents to physical therapy evaluation with chronic low back pain. The patient demonstrates deficits in lumbar spine and hip mobility as well as postural deficits, core stabilization and endurance deficits, and flexibility deficits. The patient is with limitations for prolonged standing, walking long distances, and complains of stiffness upon standing/walking after prolonged sitting that takes 20-30 minutes to subside. The patient is with worsening pain with activity and is worse towards the end of the day. The patient would benefit from physical therapy to facilitate improved core strength and endurance and postural mobility and strength to facilitate improved tolerance to ADL's. Precautions/WB Status: WBAT  Education or Treatment Limitation(s): None  Rehab Potential: Good    TREATMENT:     Initial Evaluation: x 15min     Therapeutic Exercise: x 20min  Prone quad stretch: x 30\" (B)   Manual hamstring stretch - supine: x 30\" (B)   DL bridging: 2 x 10   Supine clamshell: 2 x 10 (B)   Seated hamstring stretch: x 30\" (B)   Seated low back stretch: x 30\"   Modified downward dog: 1 x 10 x 5\"   Patient Education: Patient was educated on anatomy and pathophysiology of current condition, rationale for physical therapy, anticipated treatment interventions, prognosis, timeline for recovery, and expected functional outcomes based on evaluative findings. Patient was educated on the importance of compliance with consistent treatment and HEP to achieve mutually established goals. Administered HEP: Issued and reviewed HEP handout, exercise selection, and recommended resistance. Provided verbal and written instructions/cueing for proper technique and common errors/compensations as needed.     Neuromuscular Re-education: x 10min  Trigger Point Release: (B) lumbar paraspinals, glute med, glute max  Lumbar PA accessory joint mobs - L1-L5: Grade 3, 4 x 10\"   Hooklying PPT: attempted but d/c due to LBP   Glute set: 2 x 10 x 3\" (B)     Home Exercise Program: Patient was issued a HEP handout 9/14/2022 including glute set, DL bridging, supine clamshells, seated hamstrings stretch, seated low back stretch, and modified downward dog. Provider Interactions With Patient:   Patient education was provided as described above. All patient's questions were answered and the patient denied further comments, complaints, or concerns upon departure. Patient was issued an appt list and verbally confirmed the next appt date and time to ensure consistency with physical therapy attendance. Charges: PT Eval Low Complexity Complexity x 1, Therex x 1, NMR x 1  Total Timed Treatment: 30min       Total Treatment Time: 45min     PLAN OF CARE:      Goals:  Short-Term Goals:  1. The patient will improve hip and lumbar spine mobility to facilitate forward bending to reach for the floor for household chores and tying her shoes. Timeframe: 4 weeks. Long-Term Goals:  1. The patient will improve hip and lumbar spine mobility to facilitate erect standing and walking after prolonged sitting and upon waking in the morning. Timeframe: 6 weeks. 2. Patient will improve core strength and endurance to facilitate walking distances up to 2 miles daily for community integration and general health and fitness. Timeframe: 6 weeks. Plan Frequency / Duration: Patient will be seen for 2x/week for 6 weeks, for a total of 12 visits, over a 90 day period. We will re-evaluate the patient at that time in order to determine functional progress, evaluate short-term goal completion, and establish an updated plan of care. Possible treatment interventions will/may include: Therapeutic Activities, Therapeutic Exercise, Neuromuscular Re-education, Manual Therapy, Home Exercise Program Instruction, Patient Education, Self-Care/Home Management, and Modalities as needed.     Patient/Family was advised of these findings, precautions, and treatment options and has agreed to actively participate in planning and for this course of care. Thank you for your referral. Please co-sign or sign and return this letter via fax as soon as possible to [phone]. If you have any questions, please contact me at Dept: [phone]. Sincerely,    X___Valentine Berkowitz, PT, DPT, SCS, ATC, CSCS____ Date: _____9/14/2022________    Electronically signed by therapist: Jagjit Howe PT  [de-identified] certification required: Yes  I certify the need for these services furnished under this plan of treatment and while under my care.

## 2022-09-14 NOTE — PATIENT INSTRUCTIONS
Patient was issued a HEP handout from [email or web address]. Exercise selection, recommended resistance, and proper form/technique were reviewed with the patient. Patient verbalized understanding/comprehension of instruction and recommendations. View at [email or web address] using code: Azeem Davis

## 2022-09-19 ENCOUNTER — OFFICE VISIT (OUTPATIENT)
Dept: INTERNAL MEDICINE CLINIC | Facility: CLINIC | Age: 57
End: 2022-09-19
Payer: COMMERCIAL

## 2022-09-19 ENCOUNTER — TELEPHONE (OUTPATIENT)
Dept: PHYSICAL THERAPY | Facility: HOSPITAL | Age: 57
End: 2022-09-19

## 2022-09-19 ENCOUNTER — APPOINTMENT (OUTPATIENT)
Dept: PHYSICAL THERAPY | Age: 57
End: 2022-09-19
Attending: PHYSICIAN ASSISTANT
Payer: COMMERCIAL

## 2022-09-19 VITALS
BODY MASS INDEX: 38.89 KG/M2 | HEART RATE: 88 BPM | HEIGHT: 66 IN | WEIGHT: 242 LBS | RESPIRATION RATE: 18 BRPM | DIASTOLIC BLOOD PRESSURE: 74 MMHG | SYSTOLIC BLOOD PRESSURE: 124 MMHG | TEMPERATURE: 99 F

## 2022-09-19 DIAGNOSIS — M54.2 CERVICAL PAIN (NECK): Primary | ICD-10-CM

## 2022-09-19 PROCEDURE — 3008F BODY MASS INDEX DOCD: CPT | Performed by: FAMILY MEDICINE

## 2022-09-19 PROCEDURE — 3078F DIAST BP <80 MM HG: CPT | Performed by: FAMILY MEDICINE

## 2022-09-19 PROCEDURE — 99213 OFFICE O/P EST LOW 20 MIN: CPT | Performed by: FAMILY MEDICINE

## 2022-09-19 PROCEDURE — 3074F SYST BP LT 130 MM HG: CPT | Performed by: FAMILY MEDICINE

## 2022-09-19 RX ORDER — ORPHENADRINE CITRATE 100 MG/1
100 TABLET, EXTENDED RELEASE ORAL 2 TIMES DAILY PRN
Qty: 30 EACH | Refills: 0 | Status: SHIPPED | OUTPATIENT
Start: 2022-09-19

## 2022-09-19 RX ORDER — TRAMADOL HYDROCHLORIDE 50 MG/1
50 TABLET ORAL EVERY 6 HOURS PRN
Qty: 30 TABLET | Refills: 0 | Status: SHIPPED | OUTPATIENT
Start: 2022-09-19

## 2022-09-19 RX ORDER — INSULIN LISPRO 100 [IU]/ML
INJECTION, SOLUTION INTRAVENOUS; SUBCUTANEOUS
COMMUNITY
Start: 2022-08-04

## 2022-09-21 ENCOUNTER — APPOINTMENT (OUTPATIENT)
Dept: PHYSICAL THERAPY | Age: 57
End: 2022-09-21
Attending: PHYSICIAN ASSISTANT
Payer: COMMERCIAL

## 2022-09-21 ENCOUNTER — TELEPHONE (OUTPATIENT)
Dept: PHYSICAL THERAPY | Facility: HOSPITAL | Age: 57
End: 2022-09-21

## 2022-09-21 NOTE — PROGRESS NOTES
Date of Service: 9/21/2022  Dx: Lumbar spondylosis (M47.816), Chronic midline low back pain without sciatica (M54.50,G89.29)           Insurance: [facility]  Insurance Limits: 20 visit limit  Visit #: 2  Authorized # of Visits: N/A  POC/Auth Expiration: N/A  Date of Last PN: 9/14/22 (Visit #1)  Authorizing Physician/Provider: Génesis Thapa MD visit: ***  Fall Risk: ***         Precautions: ***            Subjective: ***    Pain/Symptom Presentation: Patient denies any paresthesias. The patient reports central low back pain, moreso on the left side than the right.     Pain at rest: 0/10  Pain at worst: 8-9/10    Objective:   ROM:   Lumbar Motion AROM    Right Left   Lumbar Flexion 15.5cm   Lumbar Extension Mild mobility deficits   *indicates activity was associated with pain    Hip Motion PROM AROM    Right Left Right Left   Hip Flexion [facility] N/A N/A   Hip Extension 10 10 N/A N/A   Hip ABD [facility] WFL N/A N/A   Hip ER (at 90deg hip flex) 55 55* N/A N/A   Hip IR (at 90deg hip flex) 20 20* N/A N/A   *indicates activity was associated with pain    Strength/MMT:  Hip Motion Strength    Right Left   Hip Flexion 4/5 4/5   Hip Extension 4-/5 4-/5   Hip ABD 4-/5 4-/5   Hip ER 4/5 4/5   Hip IR  4/5 4/5   *indicates activity was associated with pain       Knee Motion Strength    Right Left   Knee Extension 4/5 4/5   Knee Flexion 4/5 4/5   *indicates activity was associated with pain    Treatment:  Therapeutic Activities: x ***min  ***    Therapeutic Exercise: x 20min  Prone quad stretch: x 30\" (B)   Manual hamstring stretch - supine: x 30\" (B)   DL bridging: 2 x 10   Supine clamshell: 2 x 10 (B)   Seated hamstring stretch: x 30\" (B)   Seated low back stretch: x 30\"   Modified downward dog: 1 x 10 x 5\"   Patient Education: Patient was educated on anatomy and pathophysiology of current condition, rationale for physical therapy, anticipated treatment interventions, prognosis, timeline for recovery, and expected functional outcomes based on evaluative findings. Patient was educated on the importance of compliance with consistent treatment and HEP to achieve mutually established goals. Administered HEP: Issued and reviewed HEP handout, exercise selection, and recommended resistance. Provided verbal and written instructions/cueing for proper technique and common errors/compensations as needed. Neuromuscular Re-education: x 10min  Trigger Point Release: (B) lumbar paraspinals, glute med, glute max  Lumbar PA accessory joint mobs - L1-L5: Grade 3, 4 x 10\"   Hooklying PPT: attempted but d/c due to LBP   Glute set: 2 x 10 x 3\" (B)     Manual Therapy: x ***min  ***    Provider Interactions With Patient:   ***    Assessment: ***      Goals:   Short-Term Goals:  1. The patient will improve hip and lumbar spine mobility to facilitate forward bending to reach for the floor for household chores and tying her shoes. Timeframe: 4 weeks. Long-Term Goals:  1. The patient will improve hip and lumbar spine mobility to facilitate erect standing and walking after prolonged sitting and upon waking in the morning. Timeframe: 6 weeks. Patient will improve core strength and endurance to facilitate walking distances up to 2 miles daily for community integration and general health and fitness. Timeframe: 6 weeks. Plan: ***    HEP: Patient was issued a HEP handout 9/14/2022 including glute set, DL bridging, supine clamshells, seated hamstrings stretch, seated low back stretch, and modified downward dog.        Charges: ***         Total Timed Treatment: ***min    Total Treatment Time: ***min

## 2022-09-26 ENCOUNTER — OFFICE VISIT (OUTPATIENT)
Dept: PHYSICAL THERAPY | Age: 57
End: 2022-09-26
Attending: PHYSICIAN ASSISTANT
Payer: COMMERCIAL

## 2022-09-26 DIAGNOSIS — M54.50 CHRONIC MIDLINE LOW BACK PAIN WITHOUT SCIATICA: ICD-10-CM

## 2022-09-26 DIAGNOSIS — M47.816 LUMBAR SPONDYLOSIS: ICD-10-CM

## 2022-09-26 DIAGNOSIS — G89.29 CHRONIC MIDLINE LOW BACK PAIN WITHOUT SCIATICA: ICD-10-CM

## 2022-09-26 PROCEDURE — 97112 NEUROMUSCULAR REEDUCATION: CPT

## 2022-09-26 PROCEDURE — 97110 THERAPEUTIC EXERCISES: CPT

## 2022-09-26 PROCEDURE — 97164 PT RE-EVAL EST PLAN CARE: CPT

## 2022-09-26 NOTE — PATIENT INSTRUCTIONS
Patient was issued a HEP handout from [employer]. Exercise selection, recommended resistance, and proper form/technique were reviewed with the patient. Patient verbalized understanding/comprehension of instruction and recommendations. View at [email or web address] using code: [id number]

## 2022-09-26 NOTE — PROGRESS NOTES
PHYSICAL THERAPY RE-EVALUATION:     Date of Service: 9/26/2022  Dx: Lumbar spondylosis (M47.816), Chronic midline low back pain without sciatica (M54.50,G89.29),  Cervical pain (neck) (M54.2)       Insurance: [employer]  Insurance Limits: 20 visit limit  Visit #: 2  Authorized # of Visits: N/A  POC/Auth Expiration: N/A  Date of Last PN: 9/14/22 (Visit #1)  Authorizing Physician/Provider: Norberto Thapa MD visit: N/A  Fall Risk: Standard          Precautions: None            Subjective: The patient arrived today with a script for her neck and would like to be evaluated for neck pain. \"It was about two months now that it's been bothering me. I gave it some time and it never got better. I started getting headaches on the left side of my head and then it hurts in the neck. If I tilt my head to the left or turn to the right, it'll hurt. \" The patient denies any mechanism of injury. The patient reports that she had the flu last week so she wasn't able to keep up with her exercises. Aggravating activities: turning right, left sidebending, checking blindspot    Alleviating activities: heating pad, shoulder/head rolls, muscle relaxers taken as needed. Pain/Symptom Presentation: Patient denies any paresthesias. She reports left-sided neck pain. Pain at rest: 1/10  Pain at worst: 8-9/10    Objective: Activity Measures:  Headaches: Mild Activity Limitation: Patient reports that she has a headache about 50% of the time. Dizziness: No Activity Limitation: Patient is without any complaints of dizziness. Sleeping: Mild Activity Limitation: Patient reports pain interrupts her sleep 2-3 times per night. Sitting: Mild Activity Limitation: Patient is able to sit 3-4 hours before onset of stiffness. Checking Blindspot While Driving: Mild Activity Limitation: Patient reports some mild limitations in mobility for checking blindspot.      ROM:   Cervical Motion AROM    Right Left   Cervical Flexion 60   Cervical Extension 45   Cervical Side Bending 40* 18*   Cervical Rotation 50* 50*   *indicates activity was associated with pain    ROM:   Lumbar Motion AROM    Right Left   Lumbar Flexion 15.5cm   Lumbar Extension Mild mobility deficits   *indicates activity was associated with pain    Hip Motion PROM AROM    Right Left Right Left   Hip Flexion [facility] WFL N/A N/A   Hip Extension 10 10 N/A N/A   Hip ABD [facility] WFL N/A N/A   Hip ER (at 90deg hip flex) 55 55* N/A N/A   Hip IR (at 90deg hip flex) 20 20* N/A N/A   *indicates activity was associated with pain    Strength/MMT:  Hip Motion Strength    Right Left   Hip Flexion 4/5 4/5   Hip Extension 4-/5 4-/5   Hip ABD 4-/5 4-/5   Hip ER 4/5 4/5   Hip IR  4/5 4/5   *indicates activity was associated with pain     Knee Motion Strength    Right Left   Knee Extension 4/5 4/5   Knee Flexion 4/5 4/5   *indicates activity was associated with pain    Special Tests:   Neck Special Tests:   Ligamentous:  Vertebral Artery Clearing Test: (R) (-), (L) (-)  Sharp-Yuan Test: (R) (-), (L) (-)  Transverse Ligament Test: (R) (-), (L) (-)  Mechanical:  Seated Cervical Compression Test: (-)  Seated Cervical Distraction Test: (-)  Spurling's: (R) (+), (L) (+)  Upper Limb Tension Tests:   Median Nerve Tension: (R) (-), (L) (-)  Radial Nerve Tension: (R) (-), (L) (-)  Ulnar Nerve Tension: (R) (-), (L) (-)     Treatment:  PT Re-evaluation: x 10min    Therapeutic Exercise: x 15min  Prone quad stretch: x 30\" (B)   Manual hamstring stretch - supine: x 30\" (B)   DL bridging: 1 x 10   Modified downward dog: 1 x 10 x 5\"   Upper trap stretch: x 30\" (B)   Levator stretch: x 30\" (B)   HEP update: Reviewed new HEP handout with new exercises and progressions, provided verbal and written instructions/cueing for proper technique and common errors/compensations as needed    Neuromuscular Re-education: x 20min  Trigger Point Release: (B) lumbar paraspinals, glute med, glute max, cervical and thoracic paraspinals, upper michael brink  Cervical distraction: 2 x 30\"   Subocciptial release: 2 x 30:   Cervical facet joint upglide mobilization - C3-C8: 4 x 10\" (L)  Cervical facet joint downglide mobilization - C3-C8: 4 x 10\" (L)    Thoracic PA accessory joint mobs - T1-T12: Grade 3, 4 x 10\"   Lumbar PA accessory joint mobs - L1-L5: Grade 3, 4 x 10\"   Seated scap retraction: 2 x 10 x 3\" - cues for depression  Supine elastic band pull apart: 2 x 10 (B), red theraband    Provider Interactions With Patient:   Re-evaluated patient to include treatment for her neck. Assessment: Ronn Bailey arrives today with a script for additional treatment of her neck in addition to her low back. The patient demonstrates left mechanical neck pain with pain noted with left sidebending and rotation as well as right rotation. The patient demonstrates postural mobility, strength, and endurance deficits that are likely related to the patient's pain complaints. The patient's current functional limitations include checking her blindspot, headaches, and prolonged sitting. The patient was issued an updated HEP handout to include exercises for her neck. The patient would benefit from continued therapy to include treatment for her neck in addition to her low back. Goals:   Short-Term Goals:  1. The patient will improve hip and lumbar spine mobility to facilitate forward bending to reach for the floor for household chores and tying her shoes. Timeframe: 4 weeks. 2. The patient will improve cervical rotation AROM to 65deg (B) pain-free to facilitate mobility for checking her blindspot for safety while driving a vehicle]. Timeframe: 4 weeks. 3. The patient will improve cervical extension AROM to 65deg pain-free to facilitate looking up for visualization while reaching overhead into upper cabinets. Timeframe: 4 weeks. Long-Term Goals:  1.  The patient will improve hip and lumbar spine mobility to facilitate erect standing and walking after prolonged sitting and upon waking in the morning. Timeframe: 6 weeks. 2. Patient will improve core strength and endurance to facilitate walking distances up to 2 miles daily for community integration and general health and fitness. Timeframe: 6 weeks. 3. Patient will improve headaches symptoms to facilitate improved function and ADL tolerance. Timeframe: 6-8 weeks. 4. Patient will improve thoracic spine mobility and postural endurance to maintain proper posture with neutral head position while sitting to facilitate 4 hours of pain-free sitting. Timeframe: 6-8 weeks. Plan: Continue treatment for both neck and low back. HEP: Patient was issued a HEP handout 9/26/22 inclduing upper trap and levator stretch, scap retraction, and supine band pull apart, to be added to HEP issued 9/14/2022 including glute set, DL bridging, supine clamshells, seated hamstrings stretch, seated low back stretch, and modified downward dog.        Charges: Re-eval x 1, NMR x 1, Therex x 1         Total Timed Treatment: 30min    Total Treatment Time: 45min

## 2022-09-28 ENCOUNTER — APPOINTMENT (OUTPATIENT)
Dept: PHYSICAL THERAPY | Age: 57
End: 2022-09-28
Attending: PHYSICIAN ASSISTANT
Payer: COMMERCIAL

## 2022-09-28 ENCOUNTER — TELEPHONE (OUTPATIENT)
Dept: PHYSICAL THERAPY | Facility: HOSPITAL | Age: 57
End: 2022-09-28

## 2022-09-28 NOTE — PROGRESS NOTES
PHYSICAL THERAPY RE-EVALUATION:     Date of Service: 9/28/2022  Dx: Lumbar spondylosis (M47.816), Chronic midline low back pain without sciatica (M54.50,G89.29),  Cervical pain (neck) (M54.2)       [facility]  Insurance Limits: 20 visit limit  Visit #: 3  Authorized # of Visits: N/A  POC/Auth Expiration: N/A  Date of Last PN: 9/26/22 (Visit #2)  Authorizing Physician/Provider: Mikayla Thapa MD visit: N/A  Fall Risk: Standard          Precautions: None            Subjective:     Pain/Symptom Presentation: Patient denies any paresthesias. She reports left-sided neck pain.     Pain at rest: 1/10  Pain at worst: 8-9/10    Objective:     ROM:   Cervical Motion AROM    Right Left   Cervical Flexion 60   Cervical Extension 45   Cervical Side Bending 40* 18*   Cervical Rotation 50* 50*   *indicates activity was associated with pain    ROM:   Lumbar Motion AROM    Right Left   Lumbar Flexion 15.5cm   Lumbar Extension Mild mobility deficits   *indicates activity was associated with pain    Hip Motion PROM AROM    Right Left Right Left   Hip Flexion [facility] WFL N/A N/A   Hip Extension 10 10 N/A N/A   Hip ABD [facility] WFL N/A N/A   Hip ER (at 90deg hip flex) 55 55* N/A N/A   Hip IR (at 90deg hip flex) 20 20* N/A N/A   *indicates activity was associated with pain    Strength/MMT:  Hip Motion Strength    Right Left   Hip Flexion 4/5 4/5   Hip Extension 4-/5 4-/5   Hip ABD 4-/5 4-/5   Hip ER 4/5 4/5   Hip IR  4/5 4/5   *indicates activity was associated with pain     Knee Motion Strength    Right Left   Knee Extension 4/5 4/5   Knee Flexion 4/5 4/5   *indicates activity was associated with pain    Treatment:    Therapeutic Exercise: x 15min  Prone quad stretch: x 30\" (B)   Manual hamstring stretch - supine: x 30\" (B)   DL bridging: 2 x 10   Supine clamshell: 2 x 10 (B)   Seated hamstring stretch: x 30\" (B)   Seated low back stretch: x 30\"   Modified downward dog: 1 x 10 x 5\"   Upper trap stretch: x 30\" (B)   Levator stretch: x 30\" (B)     Neuromuscular Re-education: x 20min  Trigger Point Release: (B) lumbar paraspinals, glute med, glute max, cervical and thoracic paraspinals, upper trap, levator  Cervical distraction: 2 x 30\"   Subocciptial release: 2 x 30:   Cervical facet joint upglide mobilization - C3-C8: 4 x 10\" (L)  Cervical facet joint downglide mobilization - C3-C8: 4 x 10\" (L)    Thoracic PA accessory joint mobs - T1-T12: Grade 3, 4 x 10\"   Lumbar PA accessory joint mobs - L1-L5: Grade 3, 4 x 10\"   Glute set: 2 x 10 x 3\" (B)   Seated scap retraction: 2 x 10 x 3\" - cues for depression  Supine elastic band pull apart: 2 x 10 (B), red theraband    Provider Interactions With Patient:   Re-evaluated patient to include treatment for her neck. Assessment: Kendra Bursh arrives today with a script for additional treatment of her neck in addition to her low back. The patient demonstrates left mechanical neck pain with pain noted with left sidebending and rotation as well as right rotation. The patient demonstrates postural mobility, strength, and endurance deficits that are likely related to the patient's pain complaints. The patient's current functional limitations include checking her blindspot, headaches, and prolonged sitting. The patient was issued an updated HEP handout to include exercises for her neck. The patient would benefit from continued therapy to include treatment for her neck in addition to her low back. Goals:   Short-Term Goals:  1. The patient will improve hip and lumbar spine mobility to facilitate forward bending to reach for the floor for household chores and tying her shoes. Timeframe: 4 weeks. 2. The patient will improve cervical rotation AROM to 65deg (B) pain-free to facilitate mobility for checking her blindspot for safety while driving a vehicle]. Timeframe: 4 weeks.   3. The patient will improve cervical extension AROM to 65deg pain-free to facilitate looking up for visualization while reaching overhead into upper cabinets. Timeframe: 4 weeks. Long-Term Goals:  1. The patient will improve hip and lumbar spine mobility to facilitate erect standing and walking after prolonged sitting and upon waking in the morning. Timeframe: 6 weeks. 2. Patient will improve core strength and endurance to facilitate walking distances up to 2 miles daily for community integration and general health and fitness. Timeframe: 6 weeks. 3. Patient will improve headaches symptoms to facilitate improved function and ADL tolerance. Timeframe: 6-8 weeks. 4. Patient will improve thoracic spine mobility and postural endurance to maintain proper posture with neutral head position while sitting to facilitate 4 hours of pain-free sitting. Timeframe: 6-8 weeks. Plan: Continue treatment for both neck and low back. HEP: Patient was issued a HEP handout 9/26/22 inclduing upper trap and levator stretch, scap retraction, and supine band pull apart, to be added to HEP issued 9/14/2022 including glute set, DL bridging, supine clamshells, seated hamstrings stretch, seated low back stretch, and modified downward dog.        Charges: Re-eval x 1, NMR x 1, Therex x 1         Total Timed Treatment: 30min    Total Treatment Time: 45min

## 2022-10-03 ENCOUNTER — OFFICE VISIT (OUTPATIENT)
Dept: PHYSICAL THERAPY | Age: 57
End: 2022-10-03
Attending: PHYSICIAN ASSISTANT
Payer: COMMERCIAL

## 2022-10-03 DIAGNOSIS — G89.29 CHRONIC MIDLINE LOW BACK PAIN WITHOUT SCIATICA: ICD-10-CM

## 2022-10-03 DIAGNOSIS — M54.50 CHRONIC MIDLINE LOW BACK PAIN WITHOUT SCIATICA: ICD-10-CM

## 2022-10-03 DIAGNOSIS — M47.816 LUMBAR SPONDYLOSIS: ICD-10-CM

## 2022-10-03 PROCEDURE — 97112 NEUROMUSCULAR REEDUCATION: CPT

## 2022-10-03 PROCEDURE — 97110 THERAPEUTIC EXERCISES: CPT

## 2022-10-04 NOTE — PATIENT INSTRUCTIONS
Patient was issued a HEP handout from [employer]. Exercise selection, recommended resistance, and proper form/technique were reviewed with the patient. Patient verbalized understanding/comprehension of instruction and recommendations. View at [email or web address] using code: [email or web address]

## 2022-10-16 DIAGNOSIS — F41.9 ANXIETY: ICD-10-CM

## 2022-10-16 RX ORDER — ALPRAZOLAM 0.5 MG/1
TABLET ORAL
Qty: 30 TABLET | Refills: 0 | Status: SHIPPED | OUTPATIENT
Start: 2022-10-16

## 2022-10-17 ENCOUNTER — PATIENT MESSAGE (OUTPATIENT)
Dept: ENDOCRINOLOGY CLINIC | Facility: CLINIC | Age: 57
End: 2022-10-17

## 2022-10-17 RX ORDER — TIRZEPATIDE 7.5 MG/.5ML
7.5 INJECTION, SOLUTION SUBCUTANEOUS
Qty: 2 ML | Refills: 0 | Status: SHIPPED | OUTPATIENT
Start: 2022-10-17

## 2022-10-17 RX ORDER — TIRZEPATIDE 5 MG/.5ML
INJECTION, SOLUTION SUBCUTANEOUS
Refills: 0 | OUTPATIENT
Start: 2022-10-17

## 2022-10-17 NOTE — TELEPHONE ENCOUNTER
Orders Placed This Encounter      Tirzepatide (MOUNJARO) 7.5 MG/0.5ML Subcutaneous Solution Pen-injector          Sig: Inject 7.5 mg into the skin every 7 days.           Dispense:  2 mL          Refill:  0

## 2022-10-24 ENCOUNTER — TELEPHONE (OUTPATIENT)
Dept: PHYSICAL THERAPY | Age: 57
End: 2022-10-24

## 2022-10-26 ENCOUNTER — PATIENT MESSAGE (OUTPATIENT)
Dept: ENDOCRINOLOGY CLINIC | Facility: CLINIC | Age: 57
End: 2022-10-26

## 2022-10-26 DIAGNOSIS — E11.40 TYPE 2 DIABETES MELLITUS WITH DIABETIC NEUROPATHY, UNSPECIFIED (HCC): ICD-10-CM

## 2022-10-26 RX ORDER — INSULIN PMP CART,AUT,G6/7,CNTR
1 EACH SUBCUTANEOUS AS NEEDED
Qty: 1 KIT | Refills: 0 | Status: SHIPPED | OUTPATIENT
Start: 2022-10-26

## 2022-10-26 RX ORDER — INSULIN LISPRO 100 [IU]/ML
INJECTION, SOLUTION INTRAVENOUS; SUBCUTANEOUS
Qty: 50 ML | Refills: 1 | Status: SHIPPED | OUTPATIENT
Start: 2022-10-26

## 2022-10-26 RX ORDER — EMPAGLIFLOZIN 25 MG/1
TABLET, FILM COATED ORAL
Qty: 30 TABLET | Refills: 2 | Status: SHIPPED | OUTPATIENT
Start: 2022-10-26

## 2022-10-26 NOTE — TELEPHONE ENCOUNTER
From: Reed Flores  To: FELY Reyes  Sent: 10/26/2022 10:44 AM CDT  Subject: Omnipod 5    I have been told it is covered now Janine Patel you send the prescription now.

## 2022-10-26 NOTE — TELEPHONE ENCOUNTER
Jardiance 25 mg  Filled 7-13-22  Qty 30  2 refills  Upcoming appt.  1-25-23 Kathleen MATUTE 4-2-22   Labs: 5-15-22 A1c

## 2022-10-28 NOTE — TELEPHONE ENCOUNTER
From: Ok Castelan  To: FELY Nevarez  Sent: 10/26/2022 10:44 AM CDT  Subject: Omnipod 5    I have been told it is covered now Revonda Police you send the prescription now.

## 2022-10-31 DIAGNOSIS — E78.5 HYPERLIPIDEMIA LDL GOAL <100: ICD-10-CM

## 2022-10-31 DIAGNOSIS — I10 HYPERTENSION, ESSENTIAL, BENIGN: ICD-10-CM

## 2022-10-31 RX ORDER — LISINOPRIL AND HYDROCHLOROTHIAZIDE 20; 12.5 MG/1; MG/1
TABLET ORAL
Qty: 90 TABLET | Refills: 0 | Status: SHIPPED | OUTPATIENT
Start: 2022-10-31

## 2022-11-01 RX ORDER — ATORVASTATIN CALCIUM 20 MG/1
TABLET, FILM COATED ORAL
Qty: 90 TABLET | Refills: 0 | Status: SHIPPED | OUTPATIENT
Start: 2022-11-01

## 2022-11-01 NOTE — TELEPHONE ENCOUNTER
Atorvastatin 20 mg  Filled 4-14-22  Qty 90  1 refill  Upcoming appt.  1-25-23 Kathleen MATUTE 4-2-22   Labs: 3-3-22 Lipid

## 2022-11-08 ENCOUNTER — DIABETIC EDUCATION (OUTPATIENT)
Dept: ENDOCRINOLOGY CLINIC | Facility: CLINIC | Age: 57
End: 2022-11-08
Payer: COMMERCIAL

## 2022-11-08 DIAGNOSIS — Z79.4 TYPE 2 DIABETES MELLITUS WITH HYPERGLYCEMIA, WITH LONG-TERM CURRENT USE OF INSULIN (HCC): Primary | ICD-10-CM

## 2022-11-08 DIAGNOSIS — E11.65 TYPE 2 DIABETES MELLITUS WITH HYPERGLYCEMIA, WITH LONG-TERM CURRENT USE OF INSULIN (HCC): Primary | ICD-10-CM

## 2022-11-08 PROCEDURE — G0108 DIAB MANAGE TRN  PER INDIV: HCPCS | Performed by: DIETITIAN, REGISTERED

## 2022-11-08 NOTE — PROGRESS NOTES
Ok [facility]  9/27/1965 was seen for Insulin Pump Start Education:    11/8/2022  Referring Provider: Fatou Book Start time: 10:30 End time: 11:30    Starting OP5 today from [place]    Assessment:     Reviewed treatment of hypoglycemia and Rule of 15 including following up with protein after blood glucose increasing. Reviewed twice in doubt, take it out and take manual injection with pen/pen needle or vial/syringe. Gave her syringes. Pump Set-Up:  Discussed activity feature. Carb counting status: will continue to enter set boluses 40/30/45  Pt partially is connected to the [employer] portal - left message for and texted rep. Pt was instructed to keep [employer] visahl open and running in background, same as Dexcom  Practiced giving a bolus once pump was loaded with insulin. Pt is using controller for Omnipod 5  Automated Mode started. Turned reverse correction to \"OFF\"  Demonstrated how to select \"use CGM\" when giving a bolus on Omnipod 5. Discussed \"line of sight\" for Omnipod/Dexcom G6 transmitter placement. Pump programmed with the following settings:    Basal: max basal 1.5  12A 1.1    Bolus: max bolus 20  ISF   12A 30    ICR  12A 5 (enters set boluses 40/30/45)    Target 120    Duration of insulin action: 4    Comments: will check [employer] tomorrow. Reviewed written material provided with product. Patient verbalized understanding and has no further questions at this time. Patient to follow-up: diabetes educator 11/17, diabetes provider 1/25  Patient to contact diabetes center for questions/concerns.   Power Holden MS, RD, CDCES, LDN

## 2022-11-09 ENCOUNTER — APPOINTMENT (OUTPATIENT)
Dept: PHYSICAL THERAPY | Age: 57
End: 2022-11-09
Attending: FAMILY MEDICINE
Payer: COMMERCIAL

## 2022-11-14 ENCOUNTER — APPOINTMENT (OUTPATIENT)
Dept: PHYSICAL THERAPY | Age: 57
End: 2022-11-14
Attending: FAMILY MEDICINE
Payer: COMMERCIAL

## 2022-11-14 ENCOUNTER — TELEPHONE (OUTPATIENT)
Dept: PHYSICAL THERAPY | Facility: HOSPITAL | Age: 57
End: 2022-11-14

## 2022-11-17 ENCOUNTER — TELEMEDICINE (OUTPATIENT)
Dept: ENDOCRINOLOGY CLINIC | Facility: CLINIC | Age: 57
End: 2022-11-17
Payer: COMMERCIAL

## 2022-11-17 DIAGNOSIS — E11.65 TYPE 2 DIABETES MELLITUS WITH HYPERGLYCEMIA, WITH LONG-TERM CURRENT USE OF INSULIN (HCC): Primary | ICD-10-CM

## 2022-11-17 DIAGNOSIS — Z79.4 TYPE 2 DIABETES MELLITUS WITH HYPERGLYCEMIA, WITH LONG-TERM CURRENT USE OF INSULIN (HCC): Primary | ICD-10-CM

## 2022-11-17 PROCEDURE — G0108 DIAB MANAGE TRN  PER INDIV: HCPCS | Performed by: DIETITIAN, REGISTERED

## 2022-11-17 NOTE — PROGRESS NOTES
Reed Flores  9/27/1965 was seen for Continuous Glucose Sensor & Insulin Pump Follow-up Visit:    11/17/2022   Referring Provider: Esmer Foreman   Start time: 3:00 End time: 3:00    Reed Flores verbally consents to a telemedicine service with live, interactive video and audio on 11/17/2022. Patient understands and accepts financial responsibility for any deductible, co-insurance and/or co-pays associated with this service. Pump/Sensor Type: OP5 (started 11/8/22) - She likes it. No problems. Asking who is the supplier for her pods, she will look at packing slip when she returns home from work. Reviewed Pump with CGMS download:    Dates of download: 11/3-1117/22  Average sensor reading : 161 mg/dl   Standard deviation: 46 mg/dl   GMI: 7.2%    5% time above 250 mg/dl(recommended <5%)  24% time above 180mg /dl (recommended <25%)  70% time in target range:  mg/dl (recommended 70%)  1% time below target range: 70mg/dl (recommended <5%)  0% time below severe low less than 55 mg/dl (recommended <1%)    Insulin Pump Settings:    Type of infusion set: OP5    Type of insulin: Humalog U100    TDD: 26 (55% basal)    Automated Mode: 100% (7 days, 2 hours)  Automated Mode Limited 7 % (12 hours)  Automated Mode Activity: 0%  Manual Mode 0%     Pump settings:    Basal: max basal 1.5  12A 1.1    Bolus: max bolus 20  ISF   12A 30    ICR  12A 5 (enters set boluses 40/30/45)    Target 120    Duration of insulin action: 4     Comments: the ISF and BG target are missing from her [employer] report and it shows her reverse correction as \"on\". This is erroneous info, confirmed with pt and her reverse correction is \"OFF\" and she does have ISF 30 and BG targert 120 mg/dL. Plan: Pump with CGM download --copy sent to scan    Discussed pump failure back-up plan and infusion set/site issues. Reviewed use of advanced features (activity mode)  Patient verbalized understanding and has no further questions at this time.   Thank you for the referral.  Patient to follow up with diabetes provider 1/25/23   Marina Weir MS, RD, CDCCOLT, LDN

## 2022-11-22 RX ORDER — INSULIN PMP CART,AUT,G6/7,CNTR
1 EACH SUBCUTANEOUS
Qty: 10 EACH | Refills: 3 | Status: SHIPPED | OUTPATIENT
Start: 2022-11-22

## 2022-12-07 ENCOUNTER — PATIENT MESSAGE (OUTPATIENT)
Dept: ENDOCRINOLOGY CLINIC | Facility: CLINIC | Age: 57
End: 2022-12-07

## 2022-12-07 RX ORDER — TIRZEPATIDE 10 MG/.5ML
10 INJECTION, SOLUTION SUBCUTANEOUS
Qty: 2 ML | Refills: 1 | Status: SHIPPED | OUTPATIENT
Start: 2022-12-07

## 2022-12-07 NOTE — TELEPHONE ENCOUNTER
Okay to increase to 10 mg? And hopefully pharmacy has that concentration? Will pend if provider agrees    LOV: 07/27/2022  Future Appointments   Date Time Provider Minerva Garnica   12/14/2022  7:00 PM Silva Blunt [address] Na   12/21/2022  7:00 PM Geno Soria, PT SNPT [facility] Na   1/25/2023  7:30 AM FELY Whitlock [facility] [place]     Last A1c value was 6.4% done 7/27/2022.

## 2022-12-08 RX ORDER — INSULIN LISPRO 100 [IU]/ML
50 INJECTION, SOLUTION INTRAVENOUS; SUBCUTANEOUS DAILY
Qty: 50 ML | Refills: 1 | Status: SHIPPED | OUTPATIENT
Start: 2022-12-08

## 2022-12-14 ENCOUNTER — TELEPHONE (OUTPATIENT)
Dept: PHYSICAL THERAPY | Facility: HOSPITAL | Age: 57
End: 2022-12-14

## 2022-12-14 ENCOUNTER — APPOINTMENT (OUTPATIENT)
Dept: PHYSICAL THERAPY | Age: 57
End: 2022-12-14
Attending: FAMILY MEDICINE
Payer: COMMERCIAL

## 2022-12-21 ENCOUNTER — OFFICE VISIT (OUTPATIENT)
Dept: PHYSICAL THERAPY | Age: 57
End: 2022-12-21
Attending: FAMILY MEDICINE
Payer: COMMERCIAL

## 2022-12-21 PROCEDURE — 97110 THERAPEUTIC EXERCISES: CPT

## 2022-12-21 PROCEDURE — 97164 PT RE-EVAL EST PLAN CARE: CPT

## 2022-12-21 PROCEDURE — 97140 MANUAL THERAPY 1/> REGIONS: CPT

## 2022-12-22 NOTE — PATIENT INSTRUCTIONS
Patient was issued a HEP handout from [email or web address]. Exercise selection, recommended resistance, and proper form/technique were reviewed with the patient. Patient verbalized understanding/comprehension of instruction and recommendations. View at [email or web address] using code: Lucie Crane

## 2022-12-27 RX ORDER — TRAMADOL HYDROCHLORIDE 50 MG/1
50 TABLET ORAL EVERY 6 HOURS PRN
Qty: 30 TABLET | Refills: 0 | Status: SHIPPED | OUTPATIENT
Start: 2022-12-27

## 2022-12-27 RX ORDER — BLOOD-GLUCOSE SENSOR
1 EACH MISCELLANEOUS
Qty: 9 EACH | Refills: 1 | Status: SHIPPED | OUTPATIENT
Start: 2022-12-27

## 2022-12-27 RX ORDER — BLOOD-GLUCOSE TRANSMITTER
EACH MISCELLANEOUS
Qty: 1 EACH | Refills: 3 | Status: SHIPPED | OUTPATIENT
Start: 2022-12-27

## 2023-01-06 ENCOUNTER — ORDER TRANSCRIPTION (OUTPATIENT)
Dept: ADMINISTRATIVE | Facility: HOSPITAL | Age: 58
End: 2023-01-06

## 2023-01-06 DIAGNOSIS — Z12.31 ENCOUNTER FOR SCREENING MAMMOGRAM FOR MALIGNANT NEOPLASM OF BREAST: Primary | ICD-10-CM

## 2023-01-14 ENCOUNTER — HOSPITAL ENCOUNTER (OUTPATIENT)
Dept: MAMMOGRAPHY | Age: 58
Discharge: HOME | End: 2023-01-14
Attending: FAMILY MEDICINE
Payer: COMMERCIAL

## 2023-01-14 DIAGNOSIS — Z12.31 ENCOUNTER FOR SCREENING MAMMOGRAM FOR MALIGNANT NEOPLASM OF BREAST: ICD-10-CM

## 2023-01-14 PROCEDURE — 77063 BREAST TOMOSYNTHESIS BI: CPT | Performed by: FAMILY MEDICINE

## 2023-01-14 PROCEDURE — 77067 SCR MAMMO BI INCL CAD: CPT | Performed by: FAMILY MEDICINE

## 2023-02-06 RX ORDER — TIRZEPATIDE 10 MG/.5ML
10 INJECTION, SOLUTION SUBCUTANEOUS
Qty: 2 ML | Refills: 1 | Status: SHIPPED | OUTPATIENT
Start: 2023-02-06

## 2023-02-19 ENCOUNTER — PATIENT MESSAGE (OUTPATIENT)
Dept: ENDOCRINOLOGY CLINIC | Facility: CLINIC | Age: 58
End: 2023-02-19

## 2023-02-20 RX ORDER — TIRZEPATIDE 12.5 MG/.5ML
12.5 INJECTION, SOLUTION SUBCUTANEOUS
Qty: 2 ML | Refills: 1 | Status: SHIPPED | OUTPATIENT
Start: 2023-02-20 | End: 2023-02-20 | Stop reason: ALTCHOICE

## 2023-02-20 RX ORDER — TIRZEPATIDE 10 MG/.5ML
10 INJECTION, SOLUTION SUBCUTANEOUS WEEKLY
Qty: 6 ML | Refills: 0 | OUTPATIENT
Start: 2023-02-20

## 2023-02-20 RX ORDER — TIRZEPATIDE 12.5 MG/.5ML
12.5 INJECTION, SOLUTION SUBCUTANEOUS
Qty: 2 ML | Refills: 1 | Status: CANCELLED
Start: 2023-02-20

## 2023-02-20 NOTE — TELEPHONE ENCOUNTER
Pt request for [facility] pharmacy pended rx and added [facility]'s pharmacy pt requested     LVM to cancel Rx for pt since cant find it at [facility] and got it canceled as well.

## 2023-02-20 NOTE — TELEPHONE ENCOUNTER
LOV: 11/17/2022  Future Appointments   Date Time Provider Minerva Garnica   2/25/2023  7:30 AM REF BBK REF EMG8 Ref BBK 8   3/7/2023 11:15 AM Darrick Kayser, APRN [facility] [place]     Last A1c value was 6.4% done 7/27/2022. No 10 mg available, okay to increase to 12.5 mg? Will pend if provider agrees.

## 2023-02-25 ENCOUNTER — LAB ENCOUNTER (OUTPATIENT)
Dept: LAB | Age: 58
End: 2023-02-25
Attending: FAMILY MEDICINE
Payer: COMMERCIAL

## 2023-02-25 DIAGNOSIS — Z00.00 ROUTINE GENERAL MEDICAL EXAMINATION AT A HEALTH CARE FACILITY: ICD-10-CM

## 2023-02-25 DIAGNOSIS — E11.65 TYPE 2 DIABETES MELLITUS WITH HYPERGLYCEMIA, WITH LONG-TERM CURRENT USE OF INSULIN (HCC): ICD-10-CM

## 2023-02-25 DIAGNOSIS — E55.9 VITAMIN D DEFICIENCY: ICD-10-CM

## 2023-02-25 DIAGNOSIS — Z79.4 TYPE 2 DIABETES MELLITUS WITH HYPERGLYCEMIA, WITH LONG-TERM CURRENT USE OF INSULIN (HCC): ICD-10-CM

## 2023-02-25 LAB
ALBUMIN SERPL-MCNC: 3.2 G/DL (ref 3.4–5)
ALBUMIN/GLOB SERPL: 0.9 {RATIO} (ref 1–2)
ALP LIVER SERPL-CCNC: 106 U/L
ALT SERPL-CCNC: 41 U/L
ANION GAP SERPL CALC-SCNC: 6 MMOL/L (ref 0–18)
AST SERPL-CCNC: 25 U/L (ref 15–37)
BASOPHILS # BLD AUTO: 0.04 X10(3) UL (ref 0–0.2)
BASOPHILS NFR BLD AUTO: 0.4 %
BILIRUB SERPL-MCNC: 0.7 MG/DL (ref 0.1–2)
BUN BLD-MCNC: 16 MG/DL (ref 7–18)
CALCIUM BLD-MCNC: 8.7 MG/DL (ref 8.5–10.1)
CHLORIDE SERPL-SCNC: 110 MMOL/L (ref 98–112)
CHOLEST SERPL-MCNC: 104 MG/DL (ref ?–200)
CO2 SERPL-SCNC: 26 MMOL/L (ref 21–32)
CREAT BLD-MCNC: 0.77 MG/DL
CREAT UR-SCNC: 172 MG/DL
EOSINOPHIL # BLD AUTO: 0.31 X10(3) UL (ref 0–0.7)
EOSINOPHIL NFR BLD AUTO: 3.4 %
ERYTHROCYTE [DISTWIDTH] IN BLOOD BY AUTOMATED COUNT: 12.1 %
EST. AVERAGE GLUCOSE BLD GHB EST-MCNC: 140 MG/DL (ref 68–126)
FASTING PATIENT LIPID ANSWER: YES
FASTING STATUS PATIENT QL REPORTED: YES
GFR SERPLBLD BASED ON 1.73 SQ M-ARVRAT: 91 ML/MIN/1.73M2 (ref 60–?)
GLOBULIN PLAS-MCNC: 3.7 G/DL (ref 2.8–4.4)
GLUCOSE BLD-MCNC: 121 MG/DL (ref 70–99)
HBA1C MFR BLD: 6.5 % (ref ?–5.7)
HCT VFR BLD AUTO: 42.6 %
HDLC SERPL-MCNC: 39 MG/DL (ref 40–59)
HGB BLD-MCNC: 13.8 G/DL
IMM GRANULOCYTES # BLD AUTO: 0.03 X10(3) UL (ref 0–1)
IMM GRANULOCYTES NFR BLD: 0.3 %
LDLC SERPL CALC-MCNC: 49 MG/DL (ref ?–100)
LYMPHOCYTES # BLD AUTO: 2.58 X10(3) UL (ref 1–4)
LYMPHOCYTES NFR BLD AUTO: 28.2 %
MCH RBC QN AUTO: 28.5 PG (ref 26–34)
MCHC RBC AUTO-ENTMCNC: 32.4 G/DL (ref 31–37)
MCV RBC AUTO: 88 FL
MICROALBUMIN UR-MCNC: 1.85 MG/DL
MICROALBUMIN/CREAT 24H UR-RTO: 10.8 UG/MG (ref ?–30)
MONOCYTES # BLD AUTO: 0.44 X10(3) UL (ref 0.1–1)
MONOCYTES NFR BLD AUTO: 4.8 %
NEUTROPHILS # BLD AUTO: 5.76 X10 (3) UL (ref 1.5–7.7)
NEUTROPHILS # BLD AUTO: 5.76 X10(3) UL (ref 1.5–7.7)
NEUTROPHILS NFR BLD AUTO: 62.9 %
NONHDLC SERPL-MCNC: 65 MG/DL (ref ?–130)
OSMOLALITY SERPL CALC.SUM OF ELEC: 296 MOSM/KG (ref 275–295)
PLATELET # BLD AUTO: 282 10(3)UL (ref 150–450)
POTASSIUM SERPL-SCNC: 3.4 MMOL/L (ref 3.5–5.1)
PROT SERPL-MCNC: 6.9 G/DL (ref 6.4–8.2)
RBC # BLD AUTO: 4.84 X10(6)UL
SODIUM SERPL-SCNC: 142 MMOL/L (ref 136–145)
TRIGL SERPL-MCNC: 78 MG/DL (ref 30–149)
TSI SER-ACNC: 3.33 MIU/ML (ref 0.36–3.74)
VIT D+METAB SERPL-MCNC: 55 NG/ML (ref 30–100)
VLDLC SERPL CALC-MCNC: 11 MG/DL (ref 0–30)
WBC # BLD AUTO: 9.2 X10(3) UL (ref 4–11)

## 2023-02-25 PROCEDURE — 80053 COMPREHEN METABOLIC PANEL: CPT

## 2023-02-25 PROCEDURE — 85025 COMPLETE CBC W/AUTO DIFF WBC: CPT

## 2023-02-25 PROCEDURE — 36415 COLL VENOUS BLD VENIPUNCTURE: CPT

## 2023-02-25 PROCEDURE — 80061 LIPID PANEL: CPT

## 2023-02-25 PROCEDURE — 82043 UR ALBUMIN QUANTITATIVE: CPT

## 2023-02-25 PROCEDURE — 84443 ASSAY THYROID STIM HORMONE: CPT

## 2023-02-25 PROCEDURE — 3061F NEG MICROALBUMINURIA REV: CPT | Performed by: FAMILY MEDICINE

## 2023-02-25 PROCEDURE — 82306 VITAMIN D 25 HYDROXY: CPT

## 2023-02-25 PROCEDURE — 82570 ASSAY OF URINE CREATININE: CPT

## 2023-02-25 PROCEDURE — 83036 HEMOGLOBIN GLYCOSYLATED A1C: CPT

## 2023-02-28 ENCOUNTER — TELEPHONE (OUTPATIENT)
Dept: INTERNAL MEDICINE CLINIC | Facility: CLINIC | Age: 58
End: 2023-02-28

## 2023-02-28 DIAGNOSIS — E11.65 TYPE 2 DIABETES MELLITUS WITH HYPERGLYCEMIA, WITH LONG-TERM CURRENT USE OF INSULIN (HCC): Primary | ICD-10-CM

## 2023-02-28 DIAGNOSIS — E87.6 LOW SERUM POTASSIUM LEVEL: ICD-10-CM

## 2023-02-28 DIAGNOSIS — Z79.4 TYPE 2 DIABETES MELLITUS WITH HYPERGLYCEMIA, WITH LONG-TERM CURRENT USE OF INSULIN (HCC): Primary | ICD-10-CM

## 2023-02-28 RX ORDER — POTASSIUM CHLORIDE 1500 MG/1
20 TABLET, FILM COATED, EXTENDED RELEASE ORAL 2 TIMES DAILY
Qty: 8 TABLET | Refills: 0 | Status: SHIPPED | OUTPATIENT
Start: 2023-02-28 | End: 2023-03-04

## 2023-02-28 NOTE — TELEPHONE ENCOUNTER
----- Message from FELY Mitchell sent at 2/27/2023 10:05 AM CST -----  Glucose 121 and Hgba1c much improved, continue taking her medication,working on diet and exercising. Recheck in 6 months. Potassium level  a little low. Pt needs K-dur 20 surekha twice a day for 4 days. Repeat Potassium level in 1 week. HDL low, increasing exercise will help this, rest of lipids look good. Continue chol med. All other labs look good.

## 2023-03-07 ENCOUNTER — OFFICE VISIT (OUTPATIENT)
Dept: ENDOCRINOLOGY CLINIC | Facility: CLINIC | Age: 58
End: 2023-03-07
Payer: COMMERCIAL

## 2023-03-07 VITALS
HEIGHT: 66 IN | BODY MASS INDEX: 37.12 KG/M2 | SYSTOLIC BLOOD PRESSURE: 110 MMHG | DIASTOLIC BLOOD PRESSURE: 70 MMHG | WEIGHT: 231 LBS

## 2023-03-07 DIAGNOSIS — E11.65 TYPE 2 DIABETES MELLITUS WITH HYPERGLYCEMIA, WITH LONG-TERM CURRENT USE OF INSULIN (HCC): Primary | ICD-10-CM

## 2023-03-07 DIAGNOSIS — I10 HYPERTENSION, ESSENTIAL, BENIGN: ICD-10-CM

## 2023-03-07 DIAGNOSIS — Z79.4 TYPE 2 DIABETES MELLITUS WITH HYPERGLYCEMIA, WITH LONG-TERM CURRENT USE OF INSULIN (HCC): Primary | ICD-10-CM

## 2023-03-07 DIAGNOSIS — E78.5 DYSLIPIDEMIA: ICD-10-CM

## 2023-03-07 DIAGNOSIS — E66.01 MORBID (SEVERE) OBESITY DUE TO EXCESS CALORIES (HCC): ICD-10-CM

## 2023-03-07 DIAGNOSIS — Z96.41 INSULIN PUMP IN PLACE: ICD-10-CM

## 2023-03-07 PROCEDURE — 3078F DIAST BP <80 MM HG: CPT | Performed by: NURSE PRACTITIONER

## 2023-03-07 PROCEDURE — 99214 OFFICE O/P EST MOD 30 MIN: CPT | Performed by: NURSE PRACTITIONER

## 2023-03-07 PROCEDURE — 3074F SYST BP LT 130 MM HG: CPT | Performed by: NURSE PRACTITIONER

## 2023-03-07 PROCEDURE — 95251 CONT GLUC MNTR ANALYSIS I&R: CPT | Performed by: NURSE PRACTITIONER

## 2023-03-07 PROCEDURE — 3008F BODY MASS INDEX DOCD: CPT | Performed by: NURSE PRACTITIONER

## 2023-03-07 RX ORDER — TIRZEPATIDE 10 MG/.5ML
10 INJECTION, SOLUTION SUBCUTANEOUS WEEKLY
Qty: 6 ML | Refills: 3 | Status: SHIPPED | OUTPATIENT
Start: 2023-03-07

## 2023-03-08 ENCOUNTER — LAB ENCOUNTER (OUTPATIENT)
Dept: LAB | Age: 58
End: 2023-03-08
Attending: FAMILY MEDICINE
Payer: COMMERCIAL

## 2023-03-08 DIAGNOSIS — E87.6 LOW SERUM POTASSIUM LEVEL: ICD-10-CM

## 2023-03-08 DIAGNOSIS — E11.40 TYPE 2 DIABETES MELLITUS WITH DIABETIC NEUROPATHY, UNSPECIFIED (HCC): ICD-10-CM

## 2023-03-08 LAB — POTASSIUM SERPL-SCNC: 4.3 MMOL/L (ref 3.5–5.1)

## 2023-03-08 PROCEDURE — 36415 COLL VENOUS BLD VENIPUNCTURE: CPT

## 2023-03-08 PROCEDURE — 84132 ASSAY OF SERUM POTASSIUM: CPT

## 2023-03-08 RX ORDER — EMPAGLIFLOZIN 25 MG/1
1 TABLET, FILM COATED ORAL DAILY
Qty: 30 TABLET | Refills: 0 | Status: SHIPPED | OUTPATIENT
Start: 2023-03-08

## 2023-03-22 ENCOUNTER — TELEPHONE (OUTPATIENT)
Dept: INTERNAL MEDICINE CLINIC | Facility: CLINIC | Age: 58
End: 2023-03-22

## 2023-03-22 NOTE — TELEPHONE ENCOUNTER
Incoming fax from [address]    Patients DM eye exam done on 3/18/2023    No Diabetic Retinopathy     Epic updated and placed in TO in basket for review

## 2023-04-05 DIAGNOSIS — E11.40 TYPE 2 DIABETES MELLITUS WITH DIABETIC NEUROPATHY, UNSPECIFIED (HCC): ICD-10-CM

## 2023-04-05 RX ORDER — EMPAGLIFLOZIN 25 MG/1
1 TABLET, FILM COATED ORAL DAILY
Qty: 30 TABLET | Refills: 0 | Status: SHIPPED | OUTPATIENT
Start: 2023-04-05

## 2023-04-10 DIAGNOSIS — E03.9 HYPOTHYROIDISM (ACQUIRED): ICD-10-CM

## 2023-04-10 RX ORDER — LEVOTHYROXINE SODIUM 0.15 MG/1
TABLET ORAL
Qty: 90 TABLET | Refills: 0 | Status: SHIPPED | OUTPATIENT
Start: 2023-04-10

## 2023-04-22 ENCOUNTER — OFFICE VISIT (OUTPATIENT)
Dept: INTERNAL MEDICINE CLINIC | Facility: CLINIC | Age: 58
End: 2023-04-22
Payer: COMMERCIAL

## 2023-04-22 ENCOUNTER — LAB ENCOUNTER (OUTPATIENT)
Dept: LAB | Age: 58
End: 2023-04-22
Attending: FAMILY MEDICINE
Payer: COMMERCIAL

## 2023-04-22 VITALS
DIASTOLIC BLOOD PRESSURE: 54 MMHG | WEIGHT: 228 LBS | HEIGHT: 65.25 IN | SYSTOLIC BLOOD PRESSURE: 112 MMHG | HEART RATE: 74 BPM | TEMPERATURE: 99 F | BODY MASS INDEX: 37.53 KG/M2 | RESPIRATION RATE: 12 BRPM | OXYGEN SATURATION: 100 %

## 2023-04-22 DIAGNOSIS — F41.9 ANXIETY: ICD-10-CM

## 2023-04-22 DIAGNOSIS — Z79.4 TYPE 2 DIABETES MELLITUS WITH HYPERGLYCEMIA, WITH LONG-TERM CURRENT USE OF INSULIN (HCC): ICD-10-CM

## 2023-04-22 DIAGNOSIS — Z86.39 HISTORY OF LOW POTASSIUM: ICD-10-CM

## 2023-04-22 DIAGNOSIS — E11.65 TYPE 2 DIABETES MELLITUS WITH HYPERGLYCEMIA, WITH LONG-TERM CURRENT USE OF INSULIN (HCC): ICD-10-CM

## 2023-04-22 DIAGNOSIS — E03.9 HYPOTHYROIDISM (ACQUIRED): ICD-10-CM

## 2023-04-22 DIAGNOSIS — R21 RASH: ICD-10-CM

## 2023-04-22 DIAGNOSIS — E78.5 HYPERLIPIDEMIA LDL GOAL <100: ICD-10-CM

## 2023-04-22 DIAGNOSIS — Z23 PNEUMOCOCCAL VACCINE ADMINISTERED: ICD-10-CM

## 2023-04-22 DIAGNOSIS — G47.00 INSOMNIA, UNSPECIFIED TYPE: ICD-10-CM

## 2023-04-22 DIAGNOSIS — Z00.00 ROUTINE GENERAL MEDICAL EXAMINATION AT A HEALTH CARE FACILITY: Primary | ICD-10-CM

## 2023-04-22 LAB — POTASSIUM SERPL-SCNC: 4.1 MMOL/L (ref 3.5–5.1)

## 2023-04-22 PROCEDURE — 84132 ASSAY OF SERUM POTASSIUM: CPT

## 2023-04-22 PROCEDURE — 99213 OFFICE O/P EST LOW 20 MIN: CPT | Performed by: FAMILY MEDICINE

## 2023-04-22 PROCEDURE — 36415 COLL VENOUS BLD VENIPUNCTURE: CPT

## 2023-04-22 PROCEDURE — 3008F BODY MASS INDEX DOCD: CPT | Performed by: FAMILY MEDICINE

## 2023-04-22 PROCEDURE — 3074F SYST BP LT 130 MM HG: CPT | Performed by: FAMILY MEDICINE

## 2023-04-22 PROCEDURE — 90677 PCV20 VACCINE IM: CPT | Performed by: FAMILY MEDICINE

## 2023-04-22 PROCEDURE — 99396 PREV VISIT EST AGE 40-64: CPT | Performed by: FAMILY MEDICINE

## 2023-04-22 PROCEDURE — 90471 IMMUNIZATION ADMIN: CPT | Performed by: FAMILY MEDICINE

## 2023-04-22 PROCEDURE — 3078F DIAST BP <80 MM HG: CPT | Performed by: FAMILY MEDICINE

## 2023-04-22 RX ORDER — TRAMADOL HYDROCHLORIDE 50 MG/1
50 TABLET ORAL EVERY 6 HOURS PRN
Qty: 30 TABLET | Refills: 0 | Status: SHIPPED | OUTPATIENT
Start: 2023-04-22

## 2023-04-22 RX ORDER — ALPRAZOLAM 0.5 MG/1
0.5 TABLET ORAL NIGHTLY PRN
Qty: 30 TABLET | Refills: 0 | Status: SHIPPED | OUTPATIENT
Start: 2023-04-22

## 2023-04-22 RX ORDER — ZOLPIDEM TARTRATE 5 MG/1
5 TABLET ORAL NIGHTLY PRN
Qty: 30 TABLET | Refills: 0 | Status: SHIPPED | OUTPATIENT
Start: 2023-04-22

## 2023-05-01 ENCOUNTER — OFFICE VISIT (OUTPATIENT)
Dept: ORTHOPEDICS CLINIC | Facility: CLINIC | Age: 58
End: 2023-05-01
Payer: COMMERCIAL

## 2023-05-01 DIAGNOSIS — M77.8 WRIST TENDONITIS: Primary | ICD-10-CM

## 2023-05-01 PROBLEM — I47.10 PSVT (PAROXYSMAL SUPRAVENTRICULAR TACHYCARDIA): Status: ACTIVE | Noted: 2019-09-25

## 2023-05-01 PROBLEM — I47.10 PSVT (PAROXYSMAL SUPRAVENTRICULAR TACHYCARDIA) (HCC): Status: ACTIVE | Noted: 2019-09-25

## 2023-05-01 PROBLEM — I47.1 PSVT (PAROXYSMAL SUPRAVENTRICULAR TACHYCARDIA): Status: ACTIVE | Noted: 2019-09-25

## 2023-05-01 PROBLEM — I47.1 PSVT (PAROXYSMAL SUPRAVENTRICULAR TACHYCARDIA) (HCC): Status: ACTIVE | Noted: 2019-09-25

## 2023-05-01 RX ORDER — BETAMETHASONE SODIUM PHOSPHATE AND BETAMETHASONE ACETATE 3; 3 MG/ML; MG/ML
6 INJECTION, SUSPENSION INTRA-ARTICULAR; INTRALESIONAL; INTRAMUSCULAR; SOFT TISSUE ONCE
Status: COMPLETED | OUTPATIENT
Start: 2023-05-01 | End: 2023-05-01

## 2023-05-01 RX ADMIN — BETAMETHASONE SODIUM PHOSPHATE AND BETAMETHASONE ACETATE 6 MG: 3; 3 INJECTION, SUSPENSION INTRA-ARTICULAR; INTRALESIONAL; INTRAMUSCULAR; SOFT TISSUE at 12:16:00

## 2023-05-04 RX ORDER — INSULIN PMP CART,AUT,G6/7,CNTR
1 EACH SUBCUTANEOUS
Qty: 10 EACH | Refills: 3 | Status: SHIPPED | OUTPATIENT
Start: 2023-05-04

## 2023-05-06 DIAGNOSIS — E11.40 TYPE 2 DIABETES MELLITUS WITH DIABETIC NEUROPATHY, UNSPECIFIED (HCC): ICD-10-CM

## 2023-05-06 RX ORDER — EMPAGLIFLOZIN 25 MG/1
1 TABLET, FILM COATED ORAL DAILY
Qty: 90 TABLET | Refills: 0 | Status: SHIPPED | OUTPATIENT
Start: 2023-05-06

## 2023-05-30 DIAGNOSIS — F41.9 ANXIETY: ICD-10-CM

## 2023-05-30 DIAGNOSIS — G47.00 INSOMNIA, UNSPECIFIED TYPE: ICD-10-CM

## 2023-05-31 DIAGNOSIS — F41.9 ANXIETY: ICD-10-CM

## 2023-05-31 DIAGNOSIS — G47.00 INSOMNIA, UNSPECIFIED TYPE: ICD-10-CM

## 2023-05-31 RX ORDER — ALPRAZOLAM 0.5 MG/1
TABLET ORAL
Qty: 30 TABLET | Refills: 0 | Status: SHIPPED | OUTPATIENT
Start: 2023-05-31

## 2023-05-31 RX ORDER — ZOLPIDEM TARTRATE 5 MG/1
TABLET ORAL
Qty: 30 TABLET | Refills: 0 | Status: SHIPPED | OUTPATIENT
Start: 2023-05-31

## 2023-05-31 RX ORDER — ALPRAZOLAM 0.5 MG/1
TABLET ORAL
Qty: 30 TABLET | Refills: 0 | OUTPATIENT
Start: 2023-05-31

## 2023-05-31 NOTE — TELEPHONE ENCOUNTER
alprazolam 0.5 mg  Filled 4-22-23  Qty 30  0 refills  No upcoming appt. LOV 4-22-23 SM    Zolpidem 5 mg  Filled 4-22-23  Qty 30  0 refills  No upcoming appt.   LOV 4-22-23 SM

## 2023-06-01 RX ORDER — ALPRAZOLAM 0.5 MG/1
0.5 TABLET ORAL NIGHTLY PRN
Qty: 30 TABLET | Refills: 0 | OUTPATIENT
Start: 2023-06-01

## 2023-06-01 RX ORDER — TRAMADOL HYDROCHLORIDE 50 MG/1
TABLET ORAL
Qty: 30 TABLET | Refills: 0 | Status: SHIPPED | OUTPATIENT
Start: 2023-06-01

## 2023-06-01 RX ORDER — TRAMADOL HYDROCHLORIDE 50 MG/1
50 TABLET ORAL EVERY 6 HOURS PRN
Qty: 30 TABLET | Refills: 0 | OUTPATIENT
Start: 2023-06-01

## 2023-06-01 RX ORDER — ZOLPIDEM TARTRATE 5 MG/1
5 TABLET ORAL NIGHTLY PRN
Qty: 30 TABLET | Refills: 0 | OUTPATIENT
Start: 2023-06-01

## 2023-06-01 NOTE — TELEPHONE ENCOUNTER
Both alprazolam and zolpidem filled yesterday     Tramadol 50 mg  Filled 4-22-23  Qty 30  0 refills  No upcoming appt.   LOV 4-22-23

## 2023-06-02 ENCOUNTER — PATIENT MESSAGE (OUTPATIENT)
Dept: ENDOCRINOLOGY CLINIC | Facility: CLINIC | Age: 58
End: 2023-06-02

## 2023-06-06 RX ORDER — TRAMADOL HYDROCHLORIDE 50 MG/1
50 TABLET ORAL EVERY 6 HOURS PRN
Qty: 30 TABLET | Refills: 0 | Status: SHIPPED | OUTPATIENT
Start: 2023-06-06

## 2023-06-06 RX ORDER — ALPRAZOLAM 0.5 MG/1
0.5 TABLET ORAL NIGHTLY PRN
Qty: 30 TABLET | Refills: 0 | Status: SHIPPED | OUTPATIENT
Start: 2023-06-06

## 2023-06-06 RX ORDER — ZOLPIDEM TARTRATE 5 MG/1
5 TABLET ORAL NIGHTLY PRN
Qty: 30 TABLET | Refills: 0 | Status: SHIPPED | OUTPATIENT
Start: 2023-06-06

## 2023-06-06 NOTE — TELEPHONE ENCOUNTER
Pt is requesting medications to go to [facility], it is cheaper for all 3 there.

## 2023-06-14 ENCOUNTER — TELEPHONE (OUTPATIENT)
Dept: ENDOCRINOLOGY CLINIC | Facility: CLINIC | Age: 58
End: 2023-06-14

## 2023-06-14 NOTE — TELEPHONE ENCOUNTER
Received PA request for Mounjaro 10 mg, submitted through covermymeds. Response:  (Key: [id number])    This request has received a N/A outcome. Please note any additional information provided by [facility] at the bottom of this request.  A claim submitted today for a quantity of 2 of MOUNJARO 10MG/0.5ML PEN INJCTR would deny for exceeding the maximum cost allowed by the plan.

## 2023-06-20 RX ORDER — PROCHLORPERAZINE 25 MG/1
SUPPOSITORY RECTAL
Qty: 9 EACH | Refills: 1 | Status: SHIPPED | OUTPATIENT
Start: 2023-06-20

## 2023-06-20 NOTE — TELEPHONE ENCOUNTER
Orders Placed This Encounter      DEXCOM G6 SENSOR Does not apply Misc          Sig: USE 1 DEVICE EVERY 10 DAYS.           Dispense:  9 each          Refill:  1

## 2023-06-27 ENCOUNTER — TELEPHONE (OUTPATIENT)
Dept: ENDOCRINOLOGY CLINIC | Facility: CLINIC | Age: 58
End: 2023-06-27

## 2023-07-12 ENCOUNTER — OFFICE VISIT (OUTPATIENT)
Dept: ENDOCRINOLOGY CLINIC | Facility: CLINIC | Age: 58
End: 2023-07-12
Payer: COMMERCIAL

## 2023-07-12 VITALS
BODY MASS INDEX: 37 KG/M2 | HEART RATE: 71 BPM | OXYGEN SATURATION: 98 % | WEIGHT: 224.19 LBS | SYSTOLIC BLOOD PRESSURE: 122 MMHG | DIASTOLIC BLOOD PRESSURE: 68 MMHG | RESPIRATION RATE: 16 BRPM

## 2023-07-12 DIAGNOSIS — Z96.41 INSULIN PUMP IN PLACE: ICD-10-CM

## 2023-07-12 DIAGNOSIS — E66.01 CLASS 2 SEVERE OBESITY DUE TO EXCESS CALORIES WITH SERIOUS COMORBIDITY AND BODY MASS INDEX (BMI) OF 37.0 TO 37.9 IN ADULT: ICD-10-CM

## 2023-07-12 DIAGNOSIS — E78.5 DYSLIPIDEMIA: ICD-10-CM

## 2023-07-12 DIAGNOSIS — E11.65 TYPE 2 DIABETES MELLITUS WITH HYPERGLYCEMIA, WITH LONG-TERM CURRENT USE OF INSULIN (HCC): Primary | ICD-10-CM

## 2023-07-12 DIAGNOSIS — I10 HYPERTENSION, ESSENTIAL, BENIGN: ICD-10-CM

## 2023-07-12 DIAGNOSIS — Z79.4 TYPE 2 DIABETES MELLITUS WITH HYPERGLYCEMIA, WITH LONG-TERM CURRENT USE OF INSULIN (HCC): Primary | ICD-10-CM

## 2023-07-12 LAB
CARTRIDGE LOT#: 587 NUMERIC
HEMOGLOBIN A1C: 6.9 % (ref 4.3–5.6)

## 2023-07-12 PROCEDURE — 3074F SYST BP LT 130 MM HG: CPT | Performed by: NURSE PRACTITIONER

## 2023-07-12 PROCEDURE — 3078F DIAST BP <80 MM HG: CPT | Performed by: NURSE PRACTITIONER

## 2023-07-12 PROCEDURE — 83036 HEMOGLOBIN GLYCOSYLATED A1C: CPT | Performed by: NURSE PRACTITIONER

## 2023-07-12 PROCEDURE — 95251 CONT GLUC MNTR ANALYSIS I&R: CPT | Performed by: NURSE PRACTITIONER

## 2023-07-12 PROCEDURE — 99214 OFFICE O/P EST MOD 30 MIN: CPT | Performed by: NURSE PRACTITIONER

## 2023-07-12 RX ORDER — EMPAGLIFLOZIN 25 MG/1
1 TABLET, FILM COATED ORAL DAILY
Qty: 90 TABLET | Refills: 1 | Status: SHIPPED | OUTPATIENT
Start: 2023-07-12

## 2023-07-13 ENCOUNTER — NURSE ONLY (OUTPATIENT)
Dept: ENDOCRINOLOGY CLINIC | Facility: CLINIC | Age: 58
End: 2023-07-13
Payer: COMMERCIAL

## 2023-07-20 ENCOUNTER — NURSE ONLY (OUTPATIENT)
Dept: ENDOCRINOLOGY CLINIC | Facility: CLINIC | Age: 58
End: 2023-07-20
Payer: COMMERCIAL

## 2023-08-01 DIAGNOSIS — F41.9 ANXIETY: ICD-10-CM

## 2023-08-01 DIAGNOSIS — G47.00 INSOMNIA, UNSPECIFIED TYPE: ICD-10-CM

## 2023-08-01 RX ORDER — ZOLPIDEM TARTRATE 5 MG/1
5 TABLET ORAL NIGHTLY PRN
Qty: 30 TABLET | Refills: 0 | Status: SHIPPED | OUTPATIENT
Start: 2023-08-01

## 2023-08-01 RX ORDER — ALPRAZOLAM 0.5 MG/1
0.5 TABLET ORAL NIGHTLY PRN
Qty: 30 TABLET | Refills: 0 | Status: SHIPPED | OUTPATIENT
Start: 2023-08-01

## 2023-08-21 RX ORDER — TIRZEPATIDE 12.5 MG/.5ML
0.5 INJECTION, SOLUTION SUBCUTANEOUS WEEKLY
Qty: 2 ML | Refills: 1 | Status: SHIPPED | OUTPATIENT
Start: 2023-08-21

## 2023-08-21 NOTE — TELEPHONE ENCOUNTER
Received fax from [facility] with refill request for mounjaro 12.5mg, pended med  Requested Prescriptions     Pending Prescriptions Disp Refills    Tirzepatide (MOUNJARO) 12.5 MG/0.5ML Subcutaneous Solution Pen-injector 2 mL 1     Sig: Inject 0.5 mL into the skin once a week. Your appointments       Date & Time Appointment Department [facility])    Nov 15, 2023  8:15 AM CST Diabetes Pump follow up with Solomon Dumont, [address] [place] [address] Dr Brady, [facility]  [address] [facility] 100  [facility] [address] [phone]          Last A1c value was 6.9% done 7/12/2023.     Refill 6/23/23  LOV 7/12/23

## 2023-09-04 DIAGNOSIS — E78.5 HYPERLIPIDEMIA LDL GOAL <100: ICD-10-CM

## 2023-09-05 RX ORDER — ATORVASTATIN CALCIUM 20 MG/1
20 TABLET, FILM COATED ORAL DAILY
Qty: 30 TABLET | Refills: 2 | Status: SHIPPED | OUTPATIENT
Start: 2023-09-05

## 2023-09-05 NOTE — TELEPHONE ENCOUNTER
Atorvastatin 20 mg  Filled 5-12-23  Qty 30  2 refills  Future Appointments   Date Time Provider Minerva Garnica   11/15/2023  8:15 AM FELY Whitlock EM[facility]BBK [facility]   LOV 4-22-23 [facility]  Labs 2-25-23 Lipid

## 2023-09-06 DIAGNOSIS — F41.9 ANXIETY: ICD-10-CM

## 2023-09-20 DIAGNOSIS — E03.9 HYPOTHYROIDISM (ACQUIRED): ICD-10-CM

## 2023-09-20 DIAGNOSIS — F41.9 ANXIETY: ICD-10-CM

## 2023-09-20 RX ORDER — INSULIN PMP CART,AUT,G6/7,CNTR
1 EACH SUBCUTANEOUS
Qty: 10 EACH | Refills: 3 | Status: SHIPPED | OUTPATIENT
Start: 2023-09-20

## 2023-09-20 NOTE — TELEPHONE ENCOUNTER
Requested Prescriptions     Pending Prescriptions Disp Refills    OMNIPOD 5 G6 POD, GEN 5, Does not apply Misc [Pharmacy Med Name: OMNIPOD 5 G6 PODS (GEN 5) 5PK]  3     Sig: USE 1 EACH EVERY 3 (THREE) DAYS. Your appointments       Date & Time Appointment Department [facility])    Nov 15, 2023  8:15 AM CST Diabetes Pump follow up with David Bustillo, [address] [place] [address] Dr Brady, [address]  [facility] [id number] [phone]          Last A1c value was 6.9% done 7/12/2023.     Refill 5/04/23  LOV 7/12/23

## 2023-09-21 DIAGNOSIS — E03.9 HYPOTHYROIDISM (ACQUIRED): ICD-10-CM

## 2023-09-21 RX ORDER — ALPRAZOLAM 0.5 MG/1
0.5 TABLET ORAL NIGHTLY PRN
Qty: 30 TABLET | Refills: 0 | Status: SHIPPED | OUTPATIENT
Start: 2023-09-21 | End: 2023-10-26

## 2023-09-21 RX ORDER — LEVOTHYROXINE SODIUM 0.15 MG/1
TABLET ORAL
Qty: 90 TABLET | Refills: 0 | OUTPATIENT
Start: 2023-09-21

## 2023-09-21 RX ORDER — LEVOTHYROXINE SODIUM 0.15 MG/1
150 TABLET ORAL DAILY
Qty: 90 TABLET | Refills: 0 | OUTPATIENT
Start: 2023-09-21

## 2023-09-21 RX ORDER — LEVOTHYROXINE SODIUM 0.15 MG/1
TABLET ORAL
Qty: 90 TABLET | Refills: 1 | Status: SHIPPED | OUTPATIENT
Start: 2023-09-21

## 2023-09-21 NOTE — TELEPHONE ENCOUNTER
Alprazolam 0.5 mg  Filled 8-1-23  Qty 30  0 refills  Future Appointments   Date Time Provider Minerva Pierrei   11/15/2023  8:15 AM FELY Navarro EMGVANESAABKIP [facility]   LOV 4-22-23 [facility]    Levothyroxine 150 cg  Filled 4-10-23  Qty 90  0 refills  Future Appointments   Date Time Provider Minerva Pierrei   11/15/2023  8:15 AM FELY Navarro EMGSHIRA [facility]   LOV 4-22-23   Labs 2-25-23 TSH W REFLEX TO T4

## 2023-10-11 RX ORDER — TIRZEPATIDE 12.5 MG/.5ML
12.5 INJECTION, SOLUTION SUBCUTANEOUS WEEKLY
Qty: 2 ML | Refills: 1 | Status: SHIPPED | OUTPATIENT
Start: 2023-10-11

## 2023-10-11 NOTE — TELEPHONE ENCOUNTER
Requested Prescriptions     Pending Prescriptions Disp Refills    MOUNJARO 12.5 MG/0.5ML Subcutaneous Solution Pen-injector [Pharmacy Med Name: Teryl Ricardo 12.5MG/0.5ML PF  PEN INJ] 2 mL 1     Sig: ADMINISTER 12.5 MG UNDER THE SKIN 1 TIME A WEEK     Your appointments       Date & Time Appointment Department [facility])    Nov 15, 2023  8:15 AM [facility] Diabetes Pump follow up with Melissa Knowles, [address] [place] [address] Dr Brady, [facility]  [address] [facility] 100  [facility] access hospital [address]  [phone]          Last A1c value was 6.9% done 7/12/2023.     Refill 8/21/23  LOV 7/12/23

## 2023-10-18 DIAGNOSIS — E78.5 HYPERLIPIDEMIA LDL GOAL <100: Primary | ICD-10-CM

## 2023-10-18 DIAGNOSIS — G47.00 INSOMNIA, UNSPECIFIED TYPE: ICD-10-CM

## 2023-10-18 RX ORDER — ZOLPIDEM TARTRATE 5 MG/1
5 TABLET ORAL NIGHTLY PRN
Qty: 30 TABLET | Refills: 0 | Status: SHIPPED | OUTPATIENT
Start: 2023-10-18

## 2023-10-18 NOTE — TELEPHONE ENCOUNTER
MCM sent to pt to schedule med. F/u     Zolpidem 5 mg  Filled 8-1-23  Qty 30  0 refills  Future Appointments   Date Time Provider Department Center   11/15/2023  8:15 AM Kathleen Buitrago APRN [facility] 4-22-23

## 2023-10-18 NOTE — TELEPHONE ENCOUNTER
Potassium has been normal on 03/08 and 04/22 repeat. Do you need her to repeat Potassium with labs again?

## 2023-10-24 ENCOUNTER — LAB ENCOUNTER (OUTPATIENT)
Dept: LAB | Age: 58
End: 2023-10-24
Attending: FAMILY MEDICINE

## 2023-10-24 DIAGNOSIS — E78.5 HYPERLIPIDEMIA LDL GOAL <100: ICD-10-CM

## 2023-10-24 DIAGNOSIS — Z79.4 TYPE 2 DIABETES MELLITUS WITH HYPERGLYCEMIA, WITH LONG-TERM CURRENT USE OF INSULIN (HCC): ICD-10-CM

## 2023-10-24 DIAGNOSIS — E11.65 TYPE 2 DIABETES MELLITUS WITH HYPERGLYCEMIA, WITH LONG-TERM CURRENT USE OF INSULIN (HCC): ICD-10-CM

## 2023-10-24 LAB
ALBUMIN SERPL-MCNC: 3.6 G/DL (ref 3.4–5)
ALBUMIN/GLOB SERPL: 1 {RATIO} (ref 1–2)
ALP LIVER SERPL-CCNC: 127 U/L
ALT SERPL-CCNC: 23 U/L
ANION GAP SERPL CALC-SCNC: 4 MMOL/L (ref 0–18)
AST SERPL-CCNC: 14 U/L (ref 15–37)
BILIRUB SERPL-MCNC: 1 MG/DL (ref 0.1–2)
BUN BLD-MCNC: 23 MG/DL (ref 7–18)
CALCIUM BLD-MCNC: 9 MG/DL (ref 8.5–10.1)
CHLORIDE SERPL-SCNC: 109 MMOL/L (ref 98–112)
CHOLEST SERPL-MCNC: 118 MG/DL (ref ?–200)
CO2 SERPL-SCNC: 27 MMOL/L (ref 21–32)
CREAT BLD-MCNC: 0.83 MG/DL
EGFRCR SERPLBLD CKD-EPI 2021: 83 ML/MIN/1.73M2 (ref 60–?)
EST. AVERAGE GLUCOSE BLD GHB EST-MCNC: 137 MG/DL (ref 68–126)
FASTING PATIENT LIPID ANSWER: YES
FASTING STATUS PATIENT QL REPORTED: YES
GLOBULIN PLAS-MCNC: 3.5 G/DL (ref 2.8–4.4)
GLUCOSE BLD-MCNC: 128 MG/DL (ref 70–99)
HBA1C MFR BLD: 6.4 % (ref ?–5.7)
HDLC SERPL-MCNC: 35 MG/DL (ref 40–59)
LDLC SERPL CALC-MCNC: 60 MG/DL (ref ?–100)
NONHDLC SERPL-MCNC: 83 MG/DL (ref ?–130)
OSMOLALITY SERPL CALC.SUM OF ELEC: 295 MOSM/KG (ref 275–295)
POTASSIUM SERPL-SCNC: 3.6 MMOL/L (ref 3.5–5.1)
PROT SERPL-MCNC: 7.1 G/DL (ref 6.4–8.2)
SODIUM SERPL-SCNC: 140 MMOL/L (ref 136–145)
TRIGL SERPL-MCNC: 131 MG/DL (ref 30–149)
VLDLC SERPL CALC-MCNC: 19 MG/DL (ref 0–30)

## 2023-10-24 PROCEDURE — 80053 COMPREHEN METABOLIC PANEL: CPT

## 2023-10-24 PROCEDURE — 80061 LIPID PANEL: CPT

## 2023-10-24 PROCEDURE — 36415 COLL VENOUS BLD VENIPUNCTURE: CPT

## 2023-10-24 PROCEDURE — 83036 HEMOGLOBIN GLYCOSYLATED A1C: CPT

## 2023-10-24 PROCEDURE — 3044F HG A1C LEVEL LT 7.0%: CPT | Performed by: FAMILY MEDICINE

## 2023-10-26 ENCOUNTER — OFFICE VISIT (OUTPATIENT)
Dept: INTERNAL MEDICINE CLINIC | Facility: CLINIC | Age: 58
End: 2023-10-26

## 2023-10-26 VITALS
HEART RATE: 76 BPM | DIASTOLIC BLOOD PRESSURE: 68 MMHG | WEIGHT: 206.19 LBS | OXYGEN SATURATION: 97 % | RESPIRATION RATE: 16 BRPM | BODY MASS INDEX: 33.94 KG/M2 | HEIGHT: 65.25 IN | TEMPERATURE: 97 F | SYSTOLIC BLOOD PRESSURE: 122 MMHG

## 2023-10-26 DIAGNOSIS — E78.5 HYPERLIPIDEMIA LDL GOAL <100: ICD-10-CM

## 2023-10-26 DIAGNOSIS — H69.93 DYSFUNCTION OF BOTH EUSTACHIAN TUBES: Primary | ICD-10-CM

## 2023-10-26 DIAGNOSIS — Z23 NEED FOR INFLUENZA VACCINATION: ICD-10-CM

## 2023-10-26 DIAGNOSIS — E03.9 HYPOTHYROIDISM (ACQUIRED): ICD-10-CM

## 2023-10-26 DIAGNOSIS — Z23 NEED FOR TDAP VACCINATION: ICD-10-CM

## 2023-10-26 DIAGNOSIS — F41.9 ANXIETY: ICD-10-CM

## 2023-10-26 DIAGNOSIS — I10 HYPERTENSION, ESSENTIAL, BENIGN: ICD-10-CM

## 2023-10-26 PROCEDURE — 90686 IIV4 VACC NO PRSV 0.5 ML IM: CPT | Performed by: FAMILY MEDICINE

## 2023-10-26 PROCEDURE — 3078F DIAST BP <80 MM HG: CPT | Performed by: FAMILY MEDICINE

## 2023-10-26 PROCEDURE — 99213 OFFICE O/P EST LOW 20 MIN: CPT | Performed by: FAMILY MEDICINE

## 2023-10-26 PROCEDURE — 90471 IMMUNIZATION ADMIN: CPT | Performed by: FAMILY MEDICINE

## 2023-10-26 PROCEDURE — 90472 IMMUNIZATION ADMIN EACH ADD: CPT | Performed by: FAMILY MEDICINE

## 2023-10-26 PROCEDURE — 90715 TDAP VACCINE 7 YRS/> IM: CPT | Performed by: FAMILY MEDICINE

## 2023-10-26 PROCEDURE — 3008F BODY MASS INDEX DOCD: CPT | Performed by: FAMILY MEDICINE

## 2023-10-26 PROCEDURE — 3074F SYST BP LT 130 MM HG: CPT | Performed by: FAMILY MEDICINE

## 2023-10-26 RX ORDER — ALPRAZOLAM 0.5 MG/1
0.5 TABLET ORAL NIGHTLY PRN
Qty: 30 TABLET | Refills: 0 | Status: SHIPPED | OUTPATIENT
Start: 2023-10-26

## 2023-11-15 ENCOUNTER — OFFICE VISIT (OUTPATIENT)
Dept: ENDOCRINOLOGY CLINIC | Facility: CLINIC | Age: 58
End: 2023-11-15
Payer: COMMERCIAL

## 2023-11-15 VITALS
BODY MASS INDEX: 34 KG/M2 | WEIGHT: 204.38 LBS | HEART RATE: 76 BPM | RESPIRATION RATE: 17 BRPM | DIASTOLIC BLOOD PRESSURE: 62 MMHG | OXYGEN SATURATION: 98 % | SYSTOLIC BLOOD PRESSURE: 116 MMHG

## 2023-11-15 DIAGNOSIS — I10 HYPERTENSION, ESSENTIAL, BENIGN: ICD-10-CM

## 2023-11-15 DIAGNOSIS — E78.5 DYSLIPIDEMIA: ICD-10-CM

## 2023-11-15 DIAGNOSIS — E66.9 OBESITY (BMI 30-39.9): ICD-10-CM

## 2023-11-15 DIAGNOSIS — Z79.4 TYPE 2 DIABETES MELLITUS WITH HYPERGLYCEMIA, WITH LONG-TERM CURRENT USE OF INSULIN (HCC): Primary | ICD-10-CM

## 2023-11-15 DIAGNOSIS — E11.65 TYPE 2 DIABETES MELLITUS WITH HYPERGLYCEMIA, WITH LONG-TERM CURRENT USE OF INSULIN (HCC): Primary | ICD-10-CM

## 2023-11-15 DIAGNOSIS — Z96.41 INSULIN PUMP IN PLACE: ICD-10-CM

## 2023-11-15 PROCEDURE — 95251 CONT GLUC MNTR ANALYSIS I&R: CPT | Performed by: NURSE PRACTITIONER

## 2023-11-15 PROCEDURE — 3078F DIAST BP <80 MM HG: CPT | Performed by: NURSE PRACTITIONER

## 2023-11-15 PROCEDURE — 99214 OFFICE O/P EST MOD 30 MIN: CPT | Performed by: NURSE PRACTITIONER

## 2023-11-15 PROCEDURE — 3074F SYST BP LT 130 MM HG: CPT | Performed by: NURSE PRACTITIONER

## 2023-11-15 RX ORDER — INSULIN DEGLUDEC INJECTION 100 U/ML
INJECTION, SOLUTION SUBCUTANEOUS
COMMUNITY
Start: 2023-11-15

## 2023-11-15 RX ORDER — EMPAGLIFLOZIN 25 MG/1
1 TABLET, FILM COATED ORAL DAILY
Qty: 90 TABLET | Refills: 1 | Status: SHIPPED | OUTPATIENT
Start: 2023-11-15

## 2023-11-22 ENCOUNTER — TELEPHONE (OUTPATIENT)
Dept: ENDOCRINOLOGY CLINIC | Facility: CLINIC | Age: 58
End: 2023-11-22

## 2023-11-30 DIAGNOSIS — F41.9 ANXIETY: ICD-10-CM

## 2023-11-30 NOTE — TELEPHONE ENCOUNTER
Sertraline 50 mg  Filled 10-26-23  Qty 30  0 refill  Future Appointments   Date Time Provider Minerva Garnica   3/13/2024  7:30 AM FELY Leyva [facility] 10-26-23

## 2023-12-06 RX ORDER — TIRZEPATIDE 12.5 MG/.5ML
INJECTION, SOLUTION SUBCUTANEOUS
Qty: 6 ML | Refills: 1 | Status: SHIPPED | OUTPATIENT
Start: 2023-12-06

## 2023-12-06 NOTE — TELEPHONE ENCOUNTER
Requested Prescriptions     Pending Prescriptions Disp Refills    MOUNJARO 12.5 MG/0.5ML Subcutaneous Solution Pen-injector [Pharmacy Med Name: Paris Patel 12.5MG/0.5ML PF  PEN INJ] 2 mL 1     Sig: INJECT 12.5 MG UNDER THE SKIN ONE DAY A WEEK     Future Appointments   Date Time Provider Minerva Garnica   3/13/2024  7:30 AM FELY Golden [facility]     Last A1c value was 6.4% done 10/24/2023.   Refill 10/11/23  LOV 11/15/23

## 2023-12-11 RX ORDER — INSULIN LISPRO 100 [IU]/ML
50 INJECTION, SOLUTION INTRAVENOUS; SUBCUTANEOUS DAILY
Qty: 50 ML | Refills: 1 | Status: SHIPPED | OUTPATIENT
Start: 2023-12-11

## 2023-12-11 NOTE — TELEPHONE ENCOUNTER
Requested Prescriptions     Pending Prescriptions Disp Refills    HUMALOG 100 UNIT/ML Injection Solution [Pharmacy Med Name: HUMALOG 100 UNIT/ML VIAL] 50 mL 1     Sig: USES UP TO 50 UNITS DAILY AS DIRECTED VIA PUMP     Your appointments       Date & Time Appointment Department ([address], [address])    Mar 13, 2024  7:30 AM CDT Diabetes Pump follow up with Brunilda Espinoza, [address] [place] [address] Dr Brady, [address] [address]  [phone]          Last A1c value was 6.4% done 10/24/2023.     Refill 12/08/22  LOV 11/15/23

## 2023-12-19 DIAGNOSIS — E78.5 HYPERLIPIDEMIA LDL GOAL <100: ICD-10-CM

## 2023-12-20 RX ORDER — ATORVASTATIN CALCIUM 20 MG/1
20 TABLET, FILM COATED ORAL DAILY
Qty: 90 TABLET | Refills: 1 | Status: SHIPPED | OUTPATIENT
Start: 2023-12-20

## 2023-12-31 DIAGNOSIS — F41.9 ANXIETY: ICD-10-CM

## 2024-01-01 RX ORDER — ALPRAZOLAM 0.5 MG/1
0.5 TABLET ORAL NIGHTLY PRN
Qty: 30 TABLET | Refills: 0 | Status: SHIPPED | OUTPATIENT
Start: 2024-01-01

## 2024-01-03 ENCOUNTER — ORDER TRANSCRIPTION (OUTPATIENT)
Dept: ADMINISTRATIVE | Facility: HOSPITAL | Age: 59
End: 2024-01-03

## 2024-01-03 DIAGNOSIS — Z12.31 ENCOUNTER FOR SCREENING MAMMOGRAM FOR MALIGNANT NEOPLASM OF BREAST: Primary | ICD-10-CM

## 2024-01-17 RX ORDER — INSULIN PMP CART,AUT,G6/7,CNTR
1 EACH SUBCUTANEOUS
Qty: 10 EACH | Refills: 3 | Status: SHIPPED | OUTPATIENT
Start: 2024-01-17

## 2024-01-17 NOTE — TELEPHONE ENCOUNTER
Requested Prescriptions     Pending Prescriptions Disp Refills    OMNIPOD 5 G6 POD, GEN 5, Does not apply Misc [Pharmacy Med Name: OMNIPOD 5 G6 PODS (GEN 5) 5PK]  3     Sig: USE 1 POD EVERY 3 (THREE) DAYS.     Your appointments       Date & Time Appointment Department ([place])    Feb 03, 2024  7:20 AM CST SCREENING MAMMOGRAM with HERNANDEZ MARIA DEL ORSARIO RM1 [facility] ([facility])    Please arrive 15 minutes prior to your appointment.    If breastfeeding, pump or breastfeed one hour prior to your appointment time.    It is important to bring your previous mammography films to your appointment so that the radiologist has previous images to compare this exam to. Having your previous mammograms on file helps the radiologist notice subtle changes in your breast tissue that can alert us to a need for further studies. Without these images, the radiologist will not be able to detect the subtle changes and your final reading will be delayed until we receive them.    Mammography does not detect all breast cancer.  The American Cancer Society recommends a physical breast examination be performed annually by a healthcare professional.  It is necessary that you schedule an appointment with your physician or the physician that was assigned to you for a complete breast exam.    It is important that the annual screening mammogram be scheduled at least a year and a day after your last screening mammogram to ensure your insurance plan will cover the cost, unless you are experiencing breast related symptoms.    Do NOT use deodorant, talcum powder, lotions, or creams on your breasts or underarms. They leave a coating that may be picked up by the x-rays, thereby distorting the mammogram.    Wear a two piece outfit the day of the exam. This allows you to be more comfortable during the exam.      Feb 15, 2024 10:15 AM CST Follow Up Visit with Damian Villalba MD [facility] ([place], [place]), [address] [place] [address]  ([facility])    Contact your primary care provider if your insurance requires a referral.    Please arrive 15 minutes prior to your scheduled appointment. Be sure to bring your current Insurance card, Photo ID, and medication bottles or a list of your current medications.      A 24 hour notice is required to cancel any appointment or you may be charged a $40 No Show Fee.     Important: 24 hour notice is required to cancel any appointment or you may be charged a $40 No Show Fee. Please notify your physician office.         Mar 13, 2024  7:30 AM CDT Diabetes Pump follow up with Kathleen Buitrago APRN [facility] ([facility]  [address] [facility] [id number] [phone] [address]  [address] [place] [address] 101  [facility] [id number] [phone] [facility]  [address] [facility] 100  [facility] [id number] [phone]          Last A1c value was 6.4% done 10/24/2023.    Refill 9/20/23  LOV 11/15/23

## 2024-01-22 RX ORDER — PROCHLORPERAZINE 25 MG/1
1 SUPPOSITORY RECTAL
Qty: 9 EACH | Refills: 1 | Status: SHIPPED | OUTPATIENT
Start: 2024-01-22

## 2024-01-22 NOTE — TELEPHONE ENCOUNTER
Requested Prescriptions     Pending Prescriptions Disp Refills    Continuous Blood Gluc Sensor (DEXCOM G6 SENSOR) Does not apply Misc 9 each 1     Si each Every 10 days. Use as directed every 10 days     Future Appointments   Date Time Provider Department Center   2024  1:45 PM Damian Villalba MD [facility] 75 [facility]   2/3/2024  7:20 AM BBK MARIA DEL ROSARIO RM1 [facility] [place]   3/13/2024  7:30 AM Kathleen Buitrago APRN [facility]TBB[facility]     Last A1c value was 6.4% done 10/24/2023.  Refill 23  LOV 11/15/2023

## 2024-01-25 ENCOUNTER — OFFICE VISIT (OUTPATIENT)
Dept: ORTHOPEDICS CLINIC | Facility: CLINIC | Age: 59
End: 2024-01-25
Payer: COMMERCIAL

## 2024-01-25 VITALS — HEIGHT: 66 IN | BODY MASS INDEX: 31.9 KG/M2 | WEIGHT: 198.5 LBS

## 2024-01-25 DIAGNOSIS — M77.8 WRIST TENDONITIS: Primary | ICD-10-CM

## 2024-01-25 PROCEDURE — 99213 OFFICE O/P EST LOW 20 MIN: CPT | Performed by: ORTHOPAEDIC SURGERY

## 2024-01-25 PROCEDURE — 3008F BODY MASS INDEX DOCD: CPT | Performed by: ORTHOPAEDIC SURGERY

## 2024-01-25 RX ORDER — METHYLPREDNISOLONE 4 MG/1
TABLET ORAL
Qty: 1 EACH | Refills: 0 | Status: SHIPPED | OUTPATIENT
Start: 2024-01-25

## 2024-01-25 NOTE — PROGRESS NOTES
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  It is already improving.  Recommend Medrol Dosepak and wrist immobilization at nighttime.  If things do not improve she will email me and we will get her in to do a steroid injection.  Symptomatic left TFCC degenerative tear-s/p 1 csi no issues today  No NSAIDs    Follow Up: As needed    Diagnostic Studies:  X-ray left wrist 3 views: No fractures dislocations or osseous abnormalities    Physical Exam:    Ht 5' 6\" (1.676 m)   Wt 198 lb 8 oz (90 kg)   LMP  (LMP Unknown)   BMI 32.04 kg/m²     Constitutional: NAD. AOx3. Well-developed and Well-nourished.   Psychiatric: Normal mood/ affect/ behavior. Judgment and thought content normal.     Left upper Extremity:   Inspection: Skin Intact. No skin lesions. No gross deformity.   Palpation:  Tenderness to palpation over the ECU tendon, minimal pain in the ulnar fovea   Motion: Elbow: normal bilateral symmetric ext/flex  Wrist: normal bilateral symmetric ext/flex/sup/pro  Finger: full composite fist   Special Tests:  Sensation is intact light touch in the median, ulnar, radial sensory nerve distribution.  Motor function is intact to AIN, PIN, ulnar motor nerve.       CC: Left wrist pain    HPI: This 58 year old right-hand-dominant female presents with left wrist pain that started a few months ago.  No injuries or traumas that she can recall.  The pain level is moderate and sharp in intensity.  The pain will wake her up at nighttime.  She does not have difficulty falling asleep secondary to the pain.  The brace does help minimize wrist motion which helps her pain.  She is intermittently taking NSAIDs.    Interval history: Patient reports reexacerbation while making cookies over Christmas holiday.  She already notes improvement in her symptoms.  She is been wearing a wrist brace.    Occupation:     Past  Medical History:   Diagnosis Date    Anxiety state, unspecified     Arrhythmia     SVT    Calculus of kidney     Essential hypertension, benign 2/3/2014    High cholesterol     History of 2019 novel coronavirus disease (COVID-19) 09/2020    not hospitalized, flu-like symptoms; lingering symptom: fogginess    Hypothyroidism     Dx at age 42    Type II or unspecified type diabetes mellitus without mention of complication, not stated as uncontrolled     Visual impairment     glasses     Past Surgical History:   Procedure Laterality Date    COLONOSCOPY N/A 7/23/2021    Procedure: COLONOSCOPY with forcep polypectomy, clip placement x1, hot snare polypectomy, and tattoo injection;  Surgeon: Acosta Barrera DO;  Location:  ENDOSCOPY    OTHER SURGICAL HISTORY      [place] teeth extraction     Current Outpatient Medications   Medication Sig Dispense Refill    Continuous Blood Gluc Sensor (DEXCOM G6 SENSOR) Does not apply Misc 1 each Every 10 days. Use as directed every 10 days 9 each 1    Insulin Disposable Pump (OMNIPOD 5 G6 POD, GEN 5,) Does not apply Misc 1 each every 3 (three) days. 10 each 3    ALPRAZolam 0.5 MG Oral Tab Take 1 tablet (0.5 mg total) by mouth nightly as needed. 30 tablet 0    atorvastatin 20 MG Oral Tab TAKE 1 TABLET BY MOUTH EVERY DAY 90 tablet 1    insulin lispro (HUMALOG) 100 UNIT/ML Injection Solution 50 Units by Insulin pump route daily. 50 mL 1    Tirzepatide (MOUNJARO) 12.5 MG/0.5ML Subcutaneous Solution Pen-injector INJECT 12.5 MG UNDER THE SKIN ONE DAY A WEEK 6 mL 1    sertraline 50 MG Oral Tab Take 1 tablet (50 mg total) by mouth daily. 30 tablet 2    Insulin Degludec (TRESIBA FLEXTOUCH) 100 UNIT/ML Subcutaneous Solution Pen-injector Up to 12 units once daily when off pump only      Empagliflozin (JARDIANCE) 25 MG Oral Tab Take 1 tablet by mouth daily. 90 tablet 1    ZOLPIDEM 5 MG Oral Tab Take 1 tablet (5 mg total) by mouth nightly as needed for Sleep. 30 tablet 0    levothyroxine 150 MCG  Oral Tab TAKE 1 TABLET BY MOUTH EVERY DAY. labs due 90 tablet 1    traMADol 50 MG Oral Tab Take 1 tablet (50 mg total) by mouth every 6 (six) hours as needed for Pain. 30 tablet 0    lisinopril-hydroCHLOROthiazide 20-12.5 MG Oral Tab Take 1 tablet by mouth daily. 90 tablet 2    Continuous Blood Gluc Transmit (DEXCOM G6 TRANSMITTER) Does not apply Misc Use as directed with Dexcom G 6 sensor 1 each 3    orphenadrine  MG Oral Tablet 12 Hr Take 100 mg by mouth 2 (two) times daily as needed. 30 each 0    FEXOFENADINE 180 MG Oral Tab TAKE 1 TABLET BY MOUTH EVERY DAY 30 tablet 2    Metoprolol Succinate ER 25 MG Oral Tablet 24 Hr Take 1 tablet (25 mg total) by mouth daily.      Albuterol Sulfate HFA (PROAIR HFA) 108 (90 BASE) MCG/ACT Inhalation Aero Soln Inhale 2 puffs into the lungs every 4 (four) hours as needed for Wheezing. 1 Inhaler 6     Allergies   Allergen Reactions    Glucophage [Metformin Hydrochloride] RASH     Family History   Problem Relation Age of Onset    Diabetes Mother         Type 2 DM    Heart Disorder Mother         CHF and atrial fib    Diabetes Father     Diabetes Maternal Grandmother     Diabetes Brother         One brother with Type 2 DM    Thyroid Disorder Neg     Thyroid disease Neg      Social History     Occupational History    Occupation: Inventory Control     Comment: Warehouse    Occupation: [employer] worker   Tobacco Use    Smoking status: Never    Smokeless tobacco: Never   Vaping Use    Vaping Use: Never used   Substance and Sexual Activity    Alcohol use: Not Currently     Comment: rare    Drug use: Yes     Types: Cannabis     Comment: Very very rare uses marijuana gummies    Sexual activity: Yes        Review of Systems (negative unless bolded):  General: fevers, chills, fatigue  CV:  chest pain, palpitations, leg swelling  Msk: bodyaches, neck pain, neck stiffness  Skin: rashes, open wounds, nonhealing ulcers  Hem: bleeds easily, bruise easily, immunocompromised  Neuro:  dizziness, light headedness, headaches  Psych: anxious, depressed, anger issues          Damian Villalba MD  Hand, Wrist, & Elbow Surgery  [facility]  [address] [place] [address], [address], [facility]  [email or web address]  t: [phone]  f: [phone]

## 2024-02-03 ENCOUNTER — HOSPITAL ENCOUNTER (OUTPATIENT)
Dept: MAMMOGRAPHY | Age: 59
Discharge: HOME | End: 2024-02-03
Attending: FAMILY MEDICINE
Payer: COMMERCIAL

## 2024-02-03 DIAGNOSIS — Z12.31 ENCOUNTER FOR SCREENING MAMMOGRAM FOR MALIGNANT NEOPLASM OF BREAST: ICD-10-CM

## 2024-02-03 PROCEDURE — 77063 BREAST TOMOSYNTHESIS BI: CPT | Performed by: FAMILY MEDICINE

## 2024-02-03 PROCEDURE — 77067 SCR MAMMO BI INCL CAD: CPT | Performed by: FAMILY MEDICINE

## 2024-02-09 DIAGNOSIS — F41.9 ANXIETY: ICD-10-CM

## 2024-02-09 RX ORDER — ALPRAZOLAM 0.5 MG/1
0.5 TABLET ORAL NIGHTLY PRN
Qty: 30 TABLET | Refills: 0 | OUTPATIENT
Start: 2024-02-09

## 2024-02-09 RX ORDER — ALPRAZOLAM 0.5 MG/1
0.5 TABLET ORAL NIGHTLY PRN
Qty: 30 TABLET | Refills: 0 | Status: SHIPPED | OUTPATIENT
Start: 2024-02-09

## 2024-02-09 NOTE — TELEPHONE ENCOUNTER
Alprazolam 0.5 mg  Filled 1-1-24  Qty 30  0 refills  Future Appointments   Date Time Provider Department Center   3/13/2024  7:30 AM Kathleen Buitrago APRN [facility] 10-26-23

## 2024-02-10 RX ORDER — ALPRAZOLAM 0.5 MG/1
0.5 TABLET ORAL NIGHTLY PRN
Qty: 30 TABLET | Refills: 0 | OUTPATIENT
Start: 2024-02-10

## 2024-02-27 NOTE — TELEPHONE ENCOUNTER
Passed along suggestion via phone and mychart that she should start with moisturizing skin / and or using skin barrier. unable to assess

## 2024-03-01 DIAGNOSIS — I10 HYPERTENSION, ESSENTIAL, BENIGN: ICD-10-CM

## 2024-03-01 RX ORDER — LISINOPRIL AND HYDROCHLOROTHIAZIDE 20; 12.5 MG/1; MG/1
1 TABLET ORAL DAILY
Qty: 90 TABLET | Refills: 0 | Status: SHIPPED | OUTPATIENT
Start: 2024-03-01

## 2024-03-01 NOTE — TELEPHONE ENCOUNTER
Requested Prescriptions     Pending Prescriptions Disp Refills    LISINOPRIL-HYDROCHLOROTHIAZIDE 20-12.5 MG Oral Tab [Pharmacy Med Name: Lisinopril-hydroCHLOROthiazide Oral Tablet 20-12.5 MG] 90 tablet 0     Sig: TAKE 1 TABLET BY MOUTH EVERY DAY     Your appointments       Date & Time Appointment Department ([place])    Mar 13, 2024  7:30 AM CDT Diabetes Pump follow up with Kathleen Buitrago APRN [facility] ([facility])              [facility]  [address] [facility] 100  [facility] [id number] [phone]          Last A1c value was 6.4% done 10/24/2023.    Refill 4/12/23  LOV 11/15/23

## 2024-03-11 DIAGNOSIS — F41.9 ANXIETY: ICD-10-CM

## 2024-03-11 RX ORDER — ALPRAZOLAM 0.5 MG/1
0.5 TABLET ORAL NIGHTLY PRN
Qty: 30 TABLET | Refills: 0 | OUTPATIENT
Start: 2024-03-11

## 2024-03-11 RX ORDER — ALPRAZOLAM 0.5 MG/1
0.5 TABLET ORAL NIGHTLY PRN
Qty: 30 TABLET | Refills: 0 | Status: SHIPPED | OUTPATIENT
Start: 2024-03-11

## 2024-03-11 NOTE — TELEPHONE ENCOUNTER
Alprazolam 0.5 mg  Filled 2-9-24  Qty 30  0 refills  Future Appointments   Date Time Provider Department Center   3/13/2024  7:30 AM Kathleen Buitrago APRN [facility]   LOV 10-26-23     Sertraline 50 mg  Filled 11-30-23  Qty 30  2 refills  Future Appointments   Date Time Provider Department Center   3/13/2024  7:30 AM Kathleen Buitrago APRN [facility]A [facility]   LOV 10-26-23

## 2024-03-12 RX ORDER — ALPRAZOLAM 0.5 MG/1
0.5 TABLET ORAL NIGHTLY PRN
Qty: 30 TABLET | Refills: 0 | OUTPATIENT
Start: 2024-03-12

## 2024-03-12 NOTE — PROGRESS NOTES
Chief Complaint   Patient presents with    Diabetes     Follow up streaming Dexcom/Op5        Taryn is a 58 year old  presenting for  type 2 DM medication management.   Today's   A1C 6.3  % ( last A1C 6.4%)   Doing well on Mounjaro ; has lost ~ 30 # since starting  Desires to increase dose since she has hit a plateua with weight loss  TDD insulin usage has significantly decreased since Mounjaro and weight loss:   Recent pump shows ~ 13.2 units daily for past 14 days - see below     Admits cost of pods for OP have increased to $40 per month     Last DM appt 11-15 -2023   1-2024 L wrist pain; saw ortho. Had steroid rx     Dexcom download: 2.29.2024 thru 3.13.2024        Sensor download: full report in media  Average glucose: 124  mg/dl (last average:141 mg/dl)     CV (coefficient of variation) 21.1 %   GMI: estimated A1C -  %     2% time above 180mg /dl ( last download: 10%)      0% time above 250 mg/dl ( last download: 0%)      97 % time in target range:  mg/dl ( last download: 89%)      1 % time below 70mg/dl ( last download: 0%)    0% time below 54 mg/dl  ( last download : 0%)       Food/activity diary findings:   1. Hypoglycemia- low likelihood  2. Time in target: 97 %   (ADA target  > 70%)    100% automated mode  1% automated limited  0% manual mode       Pump download;   14 d average:   TDD  13.2   u/day (last upload 19.9  u/day)  Bolus: 7.2 u/day (last upload 12.5 u/day )  Basal: 6 u/day (last upload 25.9 u/day)                   Diabetes History:  Type 2 DM ~ 2009    Patient has not had hospitalizations for blood sugar issues and denies any history of pancreatitis    Previous DM therapies:  Metformin- hives/rash  Glimiperide/glipzide: change in therapy    Chinyere CGM: skin rash w adhesive      Ozempic  2mg : lack of effect; change in therapy       Current DM Regimen:    Dexcom G6CGM   Mounjaro 12.5 mg subcutaneous once weekly   Jardiance 25mg once daily      Omni pod w humalog U 100   12MN    0.95 /hour       Bolus settings:   IC ratio :  5   ~Reminded to enter carbs at meals : using set meal entries: 30-45 gm per meal     ISF:   25   BG target 120 mg/dl (correct above 120 mg/dl)   3 hour active insulin      Max basal: 1.5 u/hour  Max bolus 20 units       Pump back up plan: Tresiba 12  units once daily  if off pump > 24 hours               HGBA1C:    Lab Results   Component Value Date    A1C 6.3 (A) 03/13/2024    A1C 6.4 (H) 10/24/2023    A1C 6.9 (A) 07/12/2023     (H) 10/24/2023        Lab Results   Component Value Date    CHOLEST 118 10/24/2023    CHOLEST 104 02/25/2023    TRIG 131 10/24/2023    TRIG 78 02/25/2023    HDL 35 (L) 10/24/2023    HDL 39 (L) 02/25/2023    LDL 60 10/24/2023    LDL 49 02/25/2023     Lab Results   Component Value Date    MICROALBCREA 10.8 02/25/2023    MICROALBCREA 31.3 (H) 03/03/2022      Lab Results   Component Value Date    CREATSERUM 0.83 10/24/2023    CREATSERUM 0.77 02/25/2023    EGFRCR 83 10/24/2023    EGFRCR 91 02/25/2023     Lab Results   Component Value Date    AST 14 (L) 10/24/2023    AST 25 02/25/2023    ALT 23 10/24/2023    ALT 41 02/25/2023       Lab Results   Component Value Date    TSH 3.330 02/25/2023    TSH 1.880 03/23/2022    T4F 1.2 05/05/2018           DM Complications:  Microvascular:   Neuropathy: yes, LE   Retinopathy: no  Nephropathy: no    Macrovascular:  PVD: no  CAD: no  Stroke/CVA: no    Modifying factors:  Medication adherence: yes   Recent steroids, illness or infections(past 3m)   1-2024 wrist pain : steroid rx    Allergies: Glucophage [metformin hydrochloride]    Past Medical History:   Diagnosis Date    Anxiety state, unspecified     Arrhythmia     SVT    Calculus of kidney     Essential hypertension, benign 2/3/2014    High cholesterol     History of 2019 novel coronavirus disease (COVID-19) 09/2020    not hospitalized, flu-like symptoms; lingering symptom: fogginess    Hypothyroidism     Dx at age 42    Type II or unspecified type  diabetes mellitus without mention of complication, not stated as uncontrolled     Visual impairment     glasses     Past Surgical History:   Procedure Laterality Date    COLONOSCOPY N/A 7/23/2021    Procedure: COLONOSCOPY with forcep polypectomy, clip placement x1, hot snare polypectomy, and tattoo injection;  Surgeon: Acosta Barrera DO;  Location:  ENDOSCOPY    OTHER SURGICAL HISTORY      [place] teeth extraction     Social History     Socioeconomic History    Marital status:     Number of children: 0   Occupational History    Occupation: Inventory Control     Comment: Warehouse    Occupation: [employer] worker   Tobacco Use    Smoking status: Never    Smokeless tobacco: Never   Vaping Use    Vaping Use: Never used   Substance and Sexual Activity    Alcohol use: Not Currently     Comment: rare    Drug use: Yes     Types: Cannabis     Comment: Very very rare uses marijuana gummies    Sexual activity: Yes   Other Topics Concern    Caffeine Concern Yes     Comment: 1-2 cans of soda weekly.     Stress Concern Yes    Weight Concern Yes    Special Diet No    Exercise Yes     Comment: Walks 1-2x/week    Seat Belt Yes     Family History   Problem Relation Age of Onset    Diabetes Mother         Type 2 DM    Heart Disorder Mother         CHF and atrial fib    Diabetes Father     Diabetes Maternal Grandmother     Diabetes Brother         One brother with Type 2 DM    Thyroid Disorder Neg     Thyroid disease Neg      Current Outpatient Medications   Medication Sig Dispense Refill    Tirzepatide (MOUNJARO) 15 MG/0.5ML Subcutaneous Solution Pen-injector Inject 15 mg into the skin once a week. 6 mL 1    ALPRAZolam 0.5 MG Oral Tab Take 1 tablet (0.5 mg total) by mouth nightly as needed. 30 tablet 0    sertraline 50 MG Oral Tab Take 1 tablet (50 mg total) by mouth daily. 30 tablet 0    insulin lispro (HUMALOG) 100 UNIT/ML Injection Solution 50 Units by Insulin pump route daily. 50 mL 1    Empagliflozin (JARDIANCE) 25 MG  Oral Tab Take 1 tablet by mouth daily. 90 tablet 1    levothyroxine 150 MCG Oral Tab TAKE 1 TABLET BY MOUTH EVERY DAY. labs due 90 tablet 1    traMADol 50 MG Oral Tab Take 1 tablet (50 mg total) by mouth every 6 (six) hours as needed for Pain. 30 tablet 0    lisinopril-hydroCHLOROthiazide 20-12.5 MG Oral Tab Take 1 tablet by mouth daily. 90 tablet 0    methylPREDNISolone (MEDROL) 4 MG Oral Tablet Therapy Pack As directed. 1 each 0    Continuous Blood Gluc Sensor (DEXCOM G6 SENSOR) Does not apply Misc 1 each Every 10 days. Use as directed every 10 days 9 each 1    Insulin Disposable Pump (OMNIPOD 5 G6 POD, GEN 5,) Does not apply Misc 1 each every 3 (three) days. 10 each 3    atorvastatin 20 MG Oral Tab TAKE 1 TABLET BY MOUTH EVERY DAY 90 tablet 1    Insulin Degludec (TRESIBA FLEXTOUCH) 100 UNIT/ML Subcutaneous Solution Pen-injector Up to 12 units once daily when off pump only      ZOLPIDEM 5 MG Oral Tab Take 1 tablet (5 mg total) by mouth nightly as needed for Sleep. 30 tablet 0    Continuous Blood Gluc Transmit (DEXCOM G6 TRANSMITTER) Does not apply Misc Use as directed with Dexcom G 6 sensor 1 each 3    orphenadrine  MG Oral Tablet 12 Hr Take 100 mg by mouth 2 (two) times daily as needed. 30 each 0    FEXOFENADINE 180 MG Oral Tab TAKE 1 TABLET BY MOUTH EVERY DAY 30 tablet 2    Metoprolol Succinate ER 25 MG Oral Tablet 24 Hr Take 1 tablet (25 mg total) by mouth daily.      Albuterol Sulfate HFA (PROAIR HFA) 108 (90 BASE) MCG/ACT Inhalation Aero Soln Inhale 2 puffs into the lungs every 4 (four) hours as needed for Wheezing. 1 Inhaler 6     DM associated review of  symptoms:   Endocrine: Polyuria, polyphagia, polydipsia: no  Neurological: Paresthesias: no  HEENT: Blurred vision: no  Skin: no wounds or skin rash   Hematological: Hypoglycemia: no      Review of Systems   LUNGS: denies shortness of breath   CARDIOVASCULAR: denies chest pain  GI: denies abdominal pain, nausea  or diarrhea   : denies dysuria or  mycotic infection sxs       Physical exam:  /68   Pulse 82   Resp 17   Wt 205 lb (93 kg)   LMP  (LMP Unknown)   SpO2 97%   BMI 33.09 kg/m²   Body mass index is 33.09 kg/m².      Physical Exam   Vitals reviewed.  Constitutional: Normal appearance   Cardiovascular: Normal rate , rhythm   Pulmonary/Chest: Effort normal  Neurological: Alert and oriented .   Psychiatric: Normal mood and affect.   Musculoskeletal:  Diabetes foot exam:   Good foot hygiene.   Bilateral barefoot skin diabetic exam: normal.  Visualized feet and the appearance : normal.  Bilateral monofilament/sensation of both feet is normal. Vibration to dorsum to the first toe perceived.   Bilateral 2+ pedal pulse pedal pulse exam        Assessment/Plan:    Hypertension   Well controlled but needs ongoing monitoring   If she continues to lose weight, may be able to decrease BP meds   Will check urine m/alb to see if ace rx can be decreased.      Dyslipidemia   Last labs 11 -2023   needs ongoing monitoring    Continue Atorvastatin rx     Obesity   Continue lifestyle modifications and Mounjaro rx          Type 2 diabetes, uncontrolled, with neuropathy (HCC)  A1C: 6.3 %  (last A1C 6.4%)   Weight: 205  lb ( last weight 204 lb, 224, 237  lb  )   Diabetes control is improving -  needs ongoing management and evaluation  given the chronicity of Diabetes.    Reminded about treatment goals and benefits of improved glycemic control for her long term health        Increase  Mounjaro 12.5  --> 15 mg subcutaneous once weekly   Discussed low insulin requirements on omni pod pump with frequent pauses of insulin daily   To trial off pump after this pod expires (3 d)   Will continue dexcom G6 and monitor trends  No basal since basal daily dosing ~ 6 units daily   Will rx prandial for corrections : Humalog U 100 kwik pen provided today   If dexcom reading before or 2 hour after meals is:      180-240, dose 2 units   241-280, dose 3 units   Over 281, dose 4  units    Continue:Jardiance 25mg once daily    If she can continue off pump will change Dexcom G6 --> G7 CGM       In case she has to resume pump for higher trends off pump , adjusted settings to avoid lows if in manual mode       Basal rate: - no changes today   12MN     0.95 u/hour --> 0. 45       Bolus settings:   IC ratio :  5 --> 10     ISF:   25 --> 30   BG target 120 mg/dl (correct above 120 mg/dl)   3 hour active insulin   Reverse correction ON         Continue site rotation  site selection/rotation for pump sites to avoid lipodystrophy     Reminded pt on A1C and blood sugar targets (Fasting < 130 and post prandial <180 ) and complications associated with hyperglycemia and uncontrolled DM (on AUTUMN)  Recommended SMBG 4- x daily if not using CGM   Reviewed s/s and treatment of hypoglycemia (on AVS)   Continue lifestyle modifications since they have positive impact on diabetes/blood sugars/health (portion control, physical activity, weight loss)     Urine m/alb collected today   Check in 1 week since going off pump     The patient is asked to return in 4- m ,  but recommended to contact DM clinic sooner if questions or concerns.        Orders Placed This Encounter    HEMOGLOBIN A1C     Order Specific Question:   Release to patient     Answer:   Immediate    Microalb/Creat Ratio, Random Urine     Standing Status:   Future     Standing Expiration Date:   3/13/2025     Order Specific Question:   Release to patient     Answer:   Immediate    Tirzepatide (MOUNJARO) 15 MG/0.5ML Subcutaneous Solution Pen-injector     Sig: Inject 15 mg into the skin once a week.     Dispense:  6 mL     Refill:  1     Dx e11.65     DM quality  A1C/Blood pressure: as reported above   Nephropathy screenin2023   continue ace /arb rx.   LIPID screenin     . atorvastatin rx.   Last dilated eye exam: Last Dilated Eye Exam: 23   Exam shows retinopathy? Eye Exam shows Diabetic Retinopathy?: No  Last diabetic foot exam: Last  Foot Exam: 03/13/24    Upcoming DM appt : Dr Quinones     Spent 40 min obtaining patient history, evaluating patient, reviewing blood glucose trends, discussing treatment options, lifestyle modifications and completing documentation -this time does not including sensor interpretation time  Discussed in detail about stopping pump, alternate therapy; resuming MDI therapy  and when to call provider   The risks and benefits of my recommendations, as well as other treatment options were discussed with the patient today. questions were also answered to the best of my knowledge.

## 2024-03-13 ENCOUNTER — OFFICE VISIT (OUTPATIENT)
Dept: ENDOCRINOLOGY CLINIC | Facility: CLINIC | Age: 59
End: 2024-03-13
Payer: COMMERCIAL

## 2024-03-13 VITALS
OXYGEN SATURATION: 97 % | BODY MASS INDEX: 33 KG/M2 | SYSTOLIC BLOOD PRESSURE: 114 MMHG | DIASTOLIC BLOOD PRESSURE: 68 MMHG | WEIGHT: 205 LBS | RESPIRATION RATE: 17 BRPM | HEART RATE: 82 BPM

## 2024-03-13 DIAGNOSIS — E78.5 DYSLIPIDEMIA: ICD-10-CM

## 2024-03-13 DIAGNOSIS — E66.9 OBESITY (BMI 30-39.9): ICD-10-CM

## 2024-03-13 DIAGNOSIS — Z96.41 INSULIN PUMP IN PLACE: ICD-10-CM

## 2024-03-13 DIAGNOSIS — Z79.4 TYPE 2 DIABETES MELLITUS WITH HYPERGLYCEMIA, WITH LONG-TERM CURRENT USE OF INSULIN (HCC): Primary | ICD-10-CM

## 2024-03-13 DIAGNOSIS — E11.65 TYPE 2 DIABETES MELLITUS WITH HYPERGLYCEMIA, WITH LONG-TERM CURRENT USE OF INSULIN (HCC): Primary | ICD-10-CM

## 2024-03-13 DIAGNOSIS — I10 HYPERTENSION, ESSENTIAL, BENIGN: ICD-10-CM

## 2024-03-13 LAB
CARTRIDGE LOT#: 671 NUMERIC
HEMOGLOBIN A1C: 6.3 % (ref 4.3–5.6)

## 2024-03-13 PROCEDURE — 83036 HEMOGLOBIN GLYCOSYLATED A1C: CPT | Performed by: NURSE PRACTITIONER

## 2024-03-13 PROCEDURE — 95251 CONT GLUC MNTR ANALYSIS I&R: CPT | Performed by: NURSE PRACTITIONER

## 2024-03-13 PROCEDURE — 99215 OFFICE O/P EST HI 40 MIN: CPT | Performed by: NURSE PRACTITIONER

## 2024-03-13 PROCEDURE — 82570 ASSAY OF URINE CREATININE: CPT | Performed by: NURSE PRACTITIONER

## 2024-03-13 PROCEDURE — 82043 UR ALBUMIN QUANTITATIVE: CPT | Performed by: NURSE PRACTITIONER

## 2024-03-13 RX ORDER — TIRZEPATIDE 15 MG/.5ML
15 INJECTION, SOLUTION SUBCUTANEOUS WEEKLY
Qty: 6 ML | Refills: 1 | Status: SHIPPED | OUTPATIENT
Start: 2024-03-13

## 2024-03-13 NOTE — PATIENT INSTRUCTIONS
If you continue with good trends and low insulin volume, we can try off pump         Increase Mounjaro to 15mg once weekly (if you can get it)   This may help with more weight loss         Continue Jardiance daily     After next pod expires, stop pump   Watch trends , keep me updated      We may need to dose humalog as needed or even revisit once daily basal insulin     For now, recommendation (when off pump)       Humalog 1 unit for every 60 points above 180     180-240, dose 2 units   241-280, dose 3 units   Over 281, dose 4 units      Blood sugar targets:     Fasting blood sugar (before breakfast) Target:    (ideally less than 110)  2 hours after eating less than 180 (ideally less than  150 )     Call for blood sugars less than  75 or greater than  200 more than 2 times in a week     If you are wearing the sensor, please look at your trends/averages    Recommendations:   GMI (estimated A1C ) target under  7%     Time in range (healthy blood sugar targets) : goal is over 70%   less than 70 : goal is less than 4%   Over 180 : goal is less than 20 %   Over 250: goal is  less than 5%       Watch for low blood sugars: (less than 70 )    Treatment of Low Blood Glucose Action Plan  1. Check blood glucose to be sure that it is low. You cannot  always go by symptoms or how you feel. If in doubt, treat your low blood glucose anyway.  Rule of 15 :     2. Take 15 grams of carbohydrate (carb). Here are some choices:    4 oz. regular fruit juice  3-4 glucose tablets  6 oz. regular soda   7-8 jelly beans    3. Recheck blood glucose after 10-15 minutes. If blood glucose is still low (less than 70 mg/dl) repeat the treatment (step 2).    4. If your next meal is more than one hour away, eat a small snack.    5. If you’re not sure what caused your low blood glucose, call your healthcare provider.    6. Always check your blood glucose before you drive

## 2024-03-14 LAB
CREAT UR-SCNC: 268 MG/DL
MICROALBUMIN UR-MCNC: 2.37 MG/DL
MICROALBUMIN/CREAT 24H UR-RTO: 8.8 UG/MG (ref ?–30)

## 2024-03-17 DIAGNOSIS — G47.00 INSOMNIA, UNSPECIFIED TYPE: ICD-10-CM

## 2024-03-18 ENCOUNTER — TELEPHONE (OUTPATIENT)
Dept: ENDOCRINOLOGY CLINIC | Facility: CLINIC | Age: 59
End: 2024-03-18

## 2024-03-18 RX ORDER — INSULIN ASPART INJECTION 100 [IU]/ML
INJECTION, SOLUTION SUBCUTANEOUS
Qty: 15 ML | Refills: 0 | Status: SHIPPED | OUTPATIENT
Start: 2024-03-18

## 2024-03-18 RX ORDER — ZOLPIDEM TARTRATE 5 MG/1
5 TABLET ORAL NIGHTLY PRN
Qty: 30 TABLET | Refills: 0 | Status: SHIPPED | OUTPATIENT
Start: 2024-03-18

## 2024-03-18 NOTE — TELEPHONE ENCOUNTER
Received fax for patient Humalog no longer covered change to [email or web address]      Previous DM therapies:  Metformin- hives/rash  Glimiperide/glipzide: change in therapy    Chinyere CGM: skin rash w adhesive      Ozempic  2mg : lack of effect; change in therapy

## 2024-03-18 NOTE — TELEPHONE ENCOUNTER
Orders Placed This Encounter    Insulin Aspart, w/Niacinamide, (FIASP FLEXTOUCH) 100 UNIT/ML Subcutaneous Solution Pen-injector     Sig: Up to 10 units daily as directed in divided doses based on BG reading when off pump     Dispense:  15 mL     Refill:  0     Recommended dosing Fiasp if Blood sugar is staying above 180 (before meal or if BG is over 180 more than 2 hrs after meal)    If blood sugar is:   180-220, dose 2 units   221-260 dose 3 units  Over 261, dose 4 units

## 2024-03-18 NOTE — TELEPHONE ENCOUNTER
Ok to change to Fiasp however pt was stopping pump after next pod change  If off, can rx flex touch pens for prn use   Sent message to pt     Last A1c value was 6.3% done 3/13/2024.

## 2024-03-18 NOTE — TELEPHONE ENCOUNTER
Zolpidem 5 mg  Filled 10-18-23  Qty 30  0 refills  Future Appointments   Date Time Provider Department Center   7/31/2024  7:30 AM Kathleen Buitrago APR[facility] 10-26[facility]

## 2024-03-29 DIAGNOSIS — E03.9 HYPOTHYROIDISM (ACQUIRED): ICD-10-CM

## 2024-03-29 RX ORDER — LEVOTHYROXINE SODIUM 0.15 MG/1
150 TABLET ORAL DAILY
Qty: 30 TABLET | Refills: 0 | Status: SHIPPED | OUTPATIENT
Start: 2024-03-29

## 2024-04-10 ENCOUNTER — PATIENT MESSAGE (OUTPATIENT)
Dept: ENDOCRINOLOGY CLINIC | Facility: CLINIC | Age: 59
End: 2024-04-10

## 2024-04-11 NOTE — TELEPHONE ENCOUNTER
From: Taryn Watt  To: Kathleen Buitrago  Sent: 4/10/2024 9:44 PM CDT  Subject: Moms A1C    Hello, mom saw you today, WAYLON HORTON, she told us her A1C was 6.3, that that was great but she said it is to low that it shouldn’t be under 7? Is that correct or am I a missing something. My number have been off, could not get monujaro. It was over 4 weeks, got it at [facility] today. Hoping to turn things around. Also didn’t help our fridge went out. And we were eating out a lot.

## 2024-04-11 NOTE — TELEPHONE ENCOUNTER
Pt has been off Mounjaro for 4 weeks, but states she picked it up yesterday.  Will she need to titrate back up or okay to inject 15 mg dose?

## 2024-04-19 DIAGNOSIS — F41.9 ANXIETY: ICD-10-CM

## 2024-04-19 RX ORDER — ALPRAZOLAM 0.5 MG/1
0.5 TABLET ORAL NIGHTLY PRN
Qty: 30 TABLET | Refills: 0 | Status: SHIPPED | OUTPATIENT
Start: 2024-04-19

## 2024-04-19 NOTE — TELEPHONE ENCOUNTER
Alprazolam 0.5 mg  Filled 3-11-24  Qty 30  0 refills  Future Appointments   Date Time Provider Department Center   7/31/2024  7:30 AM Kathleen Buitrago APRN [facility] [place]   LOV 10-26-23     Sertraline 50 mg  Filled 3-11-24  Qty 30  0 refills  Future Appointments   Date Time Provider Department Center   7/31/2024  7:30 AM Kathleen Buitrago APRN [facility] [place]   L[facility] 10-26-23

## 2024-04-24 ENCOUNTER — NURSE ONLY (OUTPATIENT)
Dept: ENDOCRINOLOGY CLINIC | Facility: CLINIC | Age: 59
End: 2024-04-24
Payer: COMMERCIAL

## 2024-04-24 ENCOUNTER — PATIENT MESSAGE (OUTPATIENT)
Dept: ENDOCRINOLOGY CLINIC | Facility: CLINIC | Age: 59
End: 2024-04-24

## 2024-04-24 NOTE — TELEPHONE ENCOUNTER
Sample transmitter available in [place]    Pt usually sees CB in [facility], messaging lizzette to see if we have any transmitters at that location

## 2024-04-30 DIAGNOSIS — E03.9 HYPOTHYROIDISM (ACQUIRED): ICD-10-CM

## 2024-04-30 RX ORDER — LEVOTHYROXINE SODIUM 0.15 MG/1
TABLET ORAL
Qty: 30 TABLET | Refills: 0 | OUTPATIENT
Start: 2024-04-30

## 2024-04-30 NOTE — TELEPHONE ENCOUNTER
Levothyroxine 150 mcg    Thyroid Medication Protocol Xzaxyk9004/30/2024 07:17 AM   Protocol Details TSH in past 12 months      Filled 3-29-24  Qty 30  0 refills  Future Appointments   Date Time Provider Department Center   7/31/2024  7:30 AM Kathleen Buitrago APRN [facility] 10-26-23

## 2024-05-04 DIAGNOSIS — I10 HYPERTENSION, ESSENTIAL, BENIGN: ICD-10-CM

## 2024-05-06 RX ORDER — LISINOPRIL AND HYDROCHLOROTHIAZIDE 20; 12.5 MG/1; MG/1
1 TABLET ORAL DAILY
Qty: 90 TABLET | Refills: 0 | Status: SHIPPED | OUTPATIENT
Start: 2024-05-06

## 2024-05-06 NOTE — TELEPHONE ENCOUNTER
Requested Prescriptions     Pending Prescriptions Disp Refills    LISINOPRIL-HYDROCHLOROTHIAZIDE 20-12.5 MG Oral Tab [Pharmacy Med Name: Lisinopril-hydroCHLOROthiazide Oral Tablet 20-12.5 MG] 90 tablet 0     Sig: TAKE 1 TABLET BY MOUTH EVERY DAY     Your appointments       Date & Time Appointment Department ([place])    Jul 31, 2024 7:30 AM CDT Diabetes Pump follow up with Kathleen Buitrago APRN [facility]              [facility]  [address]  [place] [id number] [phone]          Last A1c value was 6.3% done 3/13/2024.    Refill 3/1/24  LOV 3/13/24

## 2024-05-07 DIAGNOSIS — E03.9 HYPOTHYROIDISM (ACQUIRED): ICD-10-CM

## 2024-05-07 RX ORDER — LEVOTHYROXINE SODIUM 0.15 MG/1
150 TABLET ORAL
Qty: 30 TABLET | Refills: 0 | Status: SHIPPED | OUTPATIENT
Start: 2024-05-07

## 2024-05-07 NOTE — TELEPHONE ENCOUNTER
Levothyroxine 150 mcg    Thyroid Medication Protocol Tenusg9805/07/2024 01:51 PM   Protocol Details TSH in past 12 months      Filled 3-29-24  Qty 30  0 refills  Future Appointments   Date Time Provider Department Center   6/22/2024  8:00 AM Shi Gentile APRN EM[facility] 8 [facility] [place]   7/31/2024  7:30 AM Kathleen Buitrago APRN [facility] [place]   LOV 10-16-23   Labs none

## 2024-05-15 DIAGNOSIS — F41.9 ANXIETY: ICD-10-CM

## 2024-05-16 NOTE — TELEPHONE ENCOUNTER
Requesting    Name from pharmacy: Sertraline HCl Oral Tablet 50 MG         Will file in chart as: SERTRALINE 50 MG Oral Tab    Sig: TAKE 1 TABLET BY MOUTH EVERY DAY    Disp: 30 tablet    Refills: 0    Start: 5/15/2024    Class: Normal    Non-formulary For: Anxiety    Last ordered: 3 weeks ago (4/19/2024) by FELY Lima    Last refill: 4/19/2024    Rx #: [id number]    Psychiatric Non-Scheduled (Anti-Anxiety) Incnjg88/15/2024 04:39 PM   Protocol Details In person appointment or virtual visit in the past 6 mos or appointment in next 3 mos    Depression Screening completed within the past 12 months      To be filled at: [id number] - [place], [place] - [address] [phone], [phone]     LOV: 10/26/23 wMallika CLARKE   RTC: 04/2024  Last Relevant Labs: 10/24/23  Filled: 04/19/24 #30 with 0 refills    Future Appointments   Date Time Provider Department Center   6/22/2024  8:00 AM Shi Gentile APRN [facility] [place]   7/31/2024  7:30 AM Kathleen Buitrago APRN [facility] [place]

## 2024-05-28 DIAGNOSIS — F41.9 ANXIETY: ICD-10-CM

## 2024-05-29 RX ORDER — ALPRAZOLAM 0.5 MG/1
0.5 TABLET ORAL NIGHTLY PRN
Qty: 30 TABLET | Refills: 0 | Status: SHIPPED | OUTPATIENT
Start: 2024-05-29

## 2024-05-29 NOTE — TELEPHONE ENCOUNTER
Message sent to pt to schedule CPX +med f/u    Alprazolam 0.5 mg  Filled 4-19-24  Qty 30  0 refills  Future Appointments   Date Time Provider Department Center   6/22/2024  8:00 AM Shi Gentile APRN [facility]   7/31/2024  7:30 AM Kathleen Buitrago APRN [facility] [place]   LOV 10-26-23

## 2024-05-30 NOTE — TELEPHONE ENCOUNTER
VINCE pt has an upcoming appt with you on 6/22 for an annual check-up. Pt also needs med check, do you want pt want to set up separate med check appt? Thanks!

## 2024-05-30 NOTE — TELEPHONE ENCOUNTER
She can do both together but she does get charged for two visits- or she can split them up ( and get charge for 2 visits separately)

## 2024-06-10 DIAGNOSIS — G47.00 INSOMNIA, UNSPECIFIED TYPE: ICD-10-CM

## 2024-06-10 DIAGNOSIS — E03.9 HYPOTHYROIDISM (ACQUIRED): ICD-10-CM

## 2024-06-10 RX ORDER — FEXOFENADINE HCL 180 MG/1
180 TABLET ORAL DAILY
Qty: 30 TABLET | Refills: 0 | Status: SHIPPED | OUTPATIENT
Start: 2024-06-10

## 2024-06-10 RX ORDER — LEVOTHYROXINE SODIUM 0.15 MG/1
150 TABLET ORAL
Qty: 30 TABLET | Refills: 0 | Status: SHIPPED | OUTPATIENT
Start: 2024-06-10

## 2024-06-10 RX ORDER — ZOLPIDEM TARTRATE 5 MG/1
5 TABLET ORAL NIGHTLY PRN
Qty: 30 TABLET | Refills: 0 | Status: SHIPPED | OUTPATIENT
Start: 2024-06-10

## 2024-06-10 NOTE — TELEPHONE ENCOUNTER
Requesting    fexofenadine 180 MG Oral Tab         Sig: Take 1 tablet (180 mg total) by mouth daily.    Disp: 30 tablet    Refills: 2    Start: 6/10/2024    Class: Normal    Last ordered: 2 years ago (3/14/2022) by Iain Baker MD    Allergy Medication Protocol Qfvuem88/10/2024 03:00 PM   Protocol Details In person appointment or virtual visit in the past 12 mos or appointment in next 3 mos           LOV: 10/26/2023  RTC: 6 months   Last Relevant Labs: n/a       Future Appointments   Date Time Provider Department Center   6/22/2024  8:00 AM Shi Gentile APRN [facility]   7/31/2024  7:30 AM Kathleen Buitrago APRN [facility] [place]

## 2024-06-10 NOTE — TELEPHONE ENCOUNTER
Levothyroxine 150 mcg  Filled 5-7-24  Qty 30  0 refills  Future Appointments   Date Time Provider Department Center   6/22/2024  8:00 AM Shi Gentile APRN [facility] [place]   7/31/2024  7:30 AM Kathleen Buitrago APRN [facility] [place]   LOV 10-26-23

## 2024-06-10 NOTE — TELEPHONE ENCOUNTER
Zolpidem 5 mg  Filled 3-18-24  Qty 30  0 refills  Future Appointments   Date Time Provider Department Center   6/22/2024  8:00 AM Shi Gentile APRN [facility] [place]   7/31/2024  7:30 AM Kathleen Buitrago APRN [facility] [place]   LOV 10-26-23

## 2024-06-14 DIAGNOSIS — F41.9 ANXIETY: ICD-10-CM

## 2024-06-17 NOTE — TELEPHONE ENCOUNTER
Requesting    Name from pharmacy: Sertraline HCl Oral Tablet 50 MG         Will file in chart as: SERTRALINE 50 MG Oral Tab    Sig: TAKE 1 TABLET BY MOUTH EVERY DAY    Disp: 30 tablet    Refills: 0    Start: 6/14/2024    Class: Normal    Non-formulary For: Anxiety    Last ordered: 1 month ago (5/16/2024) by Iain Baker MD    Last refill: 5/16/2024    Rx #: [id number]    Psychiatric Non-Scheduled (Anti-Anxiety) Epcdqr2806/14/2024 08:29 PM   Protocol Details In person appointment or virtual visit in the past 6 mos or appointment in next 3 mos    Depression Screening completed within the past 12 months           LOV: 10/26/2023  RTC: 6 months   Last Relevant Labs: n/a  Filled: 5/16/2024 #30 with 0 refills    Future Appointments   Date Time Provider Department Center   6/22/2024  8:00 AM Shi Gentile APRN [facility] [place]   7/31/2024  7:30 AM Kathleen Buitrago APRN [facility] [place]

## 2024-06-20 RX ORDER — INSULIN ASPART INJECTION 100 [IU]/ML
INJECTION, SOLUTION SUBCUTANEOUS
Qty: 15 ML | Refills: 0 | Status: SHIPPED | OUTPATIENT
Start: 2024-06-20

## 2024-06-20 NOTE — PROGRESS NOTES
HPI:   Taryn Watt is a 58 year old female who presents for a complete physical exam. Symptoms: denies discharge, itching, burning or dysuria, is menopausal.   Regular SBE- yes,Sexually active- yes,  Contraception- none. STD history- none    Patient presents for recheck of their hypertension/hyperlipidemia. Pt has been taking medications as instructed, no medication side effects, home BP monitoring has not been done.  Currently asymptomatic, no chest pains, jaw pains, arm pains. No headaches, dizziness or edema.  No SOB on exertion or rest.  Pt has been following a low fat diet and has been exercising/walking.  Patient sees the diabetic clinic for her diabetes. Pt aware she is due for her eye exam.     Pt is here for a recheck of anxiety. Has been tolerating the meds well. Denies any side effects from the meds. Mood has been good. Patient's appetite is good.Pt denies headaches,tics,or insomnia.No depressive symptoms or suicidal ideations. Would like to continue the same medication.      Pt had a history of hypothyroidism and here to recheck. Has been tolerating the medication well. TSH due. Denies any shakiness, palpitations, increased BM's or wgt change. Denies any fatigue, skin or hair issues.    The patient is here for a recheck on insomnia. The insomnia medication is not working like it did. Pt would like to try to go up in the dose and see if that helps. The current dose has not helped improve the difficulty falling asleep/waking in the middle of the night and the difficulty falling back asleep. Pt has not had any side effects from the medication. Pt uses the medication it as needed but seems to needs it nightly lately.      Patient also needs a refill on the Tramadol for her chronic lower back pain. Pt states it had been going on for years and will flare up if she does too much. Last refill was 3/13/24.    Screening:  Immunization History   Administered Date(s) Administered    Covid-19 Vaccine Pfizer 30  mcg/0.3 ml 02/13/2021, 03/06/2021, 10/21/2021    DTAP INFANRIX 08/04/2013    FLUZONE 6 months and older PFS 0.5 ml (37939) 09/24/2018, 09/13/2019, 10/21/2021, 10/26/2023    Flucelvax 0.5 Ml Quad PFS Single Dose 10/09/2020    Flucelvax 0.5ml Vaccine 10/09/2020    Influenza 10/21/2008, 10/14/2015, 09/12/2016, 10/04/2017, 09/24/2018, 09/11/2020    Pneumococcal Conjugate PCV20 04/22/2023    Pneumovax 23 02/22/2017    TD 03/01/2004    TDAP 07/31/2013, 10/26/2023    Zoster Vaccine Recombinant Adjuvanted (Shingrix) 01/07/2021, 04/02/2022      Menarche- 13 yr  PAP- 4/2/22  Any abnormal pap smears- none  Pregnancies- 1, Live births- 0  Mammogram-  2/3/24   Any breast cancer- none, or any gynecological cancer- none, any cancers paternal grandmother colon cancer  Labs- partial 10/24/23  DEXA over 65yr- n/a  Flu shot-  10/26/23  Colon- 1/8/22  Glasses/contacts- yes, last exam- Jan 2023- [facility]  Dental visits- 3/2024    Immunization History   Administered Date(s) Administered    Covid-19 Vaccine Pfizer 30 mcg/0.3 ml 02/13/2021, 03/06/2021, 10/21/2021    DTAP INFANRIX 08/04/2013    FLUZONE 6 months and older PFS 0.5 ml (48264) 09/24/2018, 09/13/2019, 10/21/2021, 10/26/2023    Flucelvax 0.5 Ml Quad PFS Single Dose 10/09/2020    Flucelvax 0.5ml Vaccine 10/09/2020    Influenza 10/21/2008, 10/14/2015, 09/12/2016, 10/04/2017, 09/24/2018, 09/11/2020    Pneumococcal Conjugate PCV20 04/22/2023    Pneumovax 23 02/22/2017    TD 03/01/2004    TDAP 07/31/2013, 10/26/2023    Zoster Vaccine Recombinant Adjuvanted (Shingrix) 01/07/2021, 04/02/2022     Wt Readings from Last 6 Encounters:   06/22/24 207 lb 6.4 oz (94.1 kg)   03/13/24 205 lb (93 kg)   01/25/24 198 lb 8 oz (90 kg)   11/15/23 204 lb 6.4 oz (92.7 kg)   10/26/23 206 lb 3.2 oz (93.5 kg)   07/12/23 224 lb 3.2 oz (101.7 kg)     Body mass index is 34.51 kg/m².     Lab Results   Component Value Date     (H) 10/24/2023     (H) 02/25/2023     (H) 03/03/2022     Lab Results    Component Value Date    CHOLEST 118 10/24/2023    CHOLEST 104 02/25/2023    CHOLEST 133 03/03/2022     Lab Results   Component Value Date    HDL 35 (L) 10/24/2023    HDL 39 (L) 02/25/2023    HDL 41 03/03/2022     Lab Results   Component Value Date    LDL 60 10/24/2023    LDL 49 02/25/2023    LDL 67 03/03/2022     Lab Results   Component Value Date    AST 14 (L) 10/24/2023    AST 25 02/25/2023    AST 18 03/03/2022     Lab Results   Component Value Date    ALT 23 10/24/2023    ALT 41 02/25/2023    ALT 34 03/03/2022       Current Outpatient Medications   Medication Sig Dispense Refill    traMADol 50 MG Oral Tab Take 1 tablet (50 mg total) by mouth every 6 (six) hours as needed for Pain. 30 tablet 0    zolpidem 10 MG Oral Tab Take 1 tablet (10 mg total) by mouth nightly as needed for Sleep. 90 tablet 0    insulin degludec (TRESIBA FLEXTOUCH) 100 UNIT/ML Subcutaneous Solution Pen-injector Up to 12 units once daily when off pump only 3 mL 0    Insulin Disposable Pump (OMNIPOD 5 G6 PODS, GEN 5,) Does not apply Misc 1 each every 3 (three) days. 10 each 3    Insulin Aspart, w/Niacinamide, (FIASP FLEXTOUCH) 100 UNIT/ML Subcutaneous Solution Pen-injector INJECT UP TO 10 UNITS DAILY AS DIRECTED IN DIVIDED DOSES BASED ON BLOOD GLUCOSE READING WHEN OFF PUMP 15 mL 0    fexofenadine 180 MG Oral Tab Take 1 tablet (180 mg total) by mouth daily. 30 tablet 0    lisinopril-hydroCHLOROthiazide 20-12.5 MG Oral Tab Take 1 tablet by mouth daily. 90 tablet 0    Continuous Blood Gluc Sensor (DEXCOM G6 SENSOR) Does not apply Misc 1 each Every 10 days. Use as directed every 10 days 9 each 1    insulin lispro (HUMALOG) 100 UNIT/ML Injection Solution 50 Units by Insulin pump route daily. 50 mL 1    Empagliflozin (JARDIANCE) 25 MG Oral Tab Take 1 tablet by mouth daily. 90 tablet 1    Continuous Blood Gluc Transmit (DEXCOM G6 TRANSMITTER) Does not apply Misc Use as directed with Dexcom G 6 sensor 1 each 3    orphenadrine  MG Oral Tablet 12  Hr Take 100 mg by mouth 2 (two) times daily as needed. 30 each 0    Metoprolol Succinate ER 25 MG Oral Tablet 24 Hr Take 1 tablet (25 mg total) by mouth daily.      Albuterol Sulfate HFA (PROAIR HFA) 108 (90 BASE) MCG/ACT Inhalation Aero Soln Inhale 2 puffs into the lungs every 4 (four) hours as needed for Wheezing. 1 Inhaler 6    Tirzepatide (MOUNJARO) 2.5 MG/0.5ML Subcutaneous Solution Pen-injector Inject 2.5 mg into the skin once a week. DX E11.9 2 mL 0    Tirzepatide (MOUNJARO) 5 MG/0.5ML Subcutaneous Solution Pen-injector Inject 5 mg into the skin once a week for 4 doses. 2 mL 0    Insulin Disposable Pump (OMNIPOD 5 G7 PODS, GEN 5,) Does not apply Misc 1 each every 3 (three) days. 10 each 0      Past Medical History:    Anxiety state, unspecified    Arrhythmia    SVT    Calculus of kidney    Essential hypertension, benign    High cholesterol    History of 2019 novel coronavirus disease (COVID-19)    not hospitalized, flu-like symptoms; lingering symptom: fogginess    Hypothyroidism    Dx at age 42    Type II or unspecified type diabetes mellitus without mention of complication, not stated as uncontrolled    Visual impairment    glasses      Past Surgical History:   Procedure Laterality Date    Colonoscopy N/A 7/23/2021    Procedure: COLONOSCOPY with forcep polypectomy, clip placement x1, hot snare polypectomy, and tattoo injection;  Surgeon: Acosta Barrera DO;  Location:  ENDOSCOPY    Other surgical history      [place] teeth extraction      Family History   Problem Relation Age of Onset    Diabetes Mother         Type 2 DM    Heart Disorder Mother         CHF and atrial fib    Diabetes Father     Diabetes Maternal Grandmother     Diabetes Brother         One brother with Type 2 DM    Thyroid Disorder Neg     Thyroid disease Neg       Social History:   Social History     Socioeconomic History    Marital status:     Number of children: 0   Occupational History    Occupation: Inventory Control      Comment: Warehouse    Occupation: [employer] worker   Tobacco Use    Smoking status: Never    Smokeless tobacco: Never   Vaping Use    Vaping status: Never Used   Substance and Sexual Activity    Alcohol use: Not Currently     Comment: rare    Drug use: Yes     Types: Cannabis     Comment: Very very rare uses marijuana gummies    Sexual activity: Yes   Other Topics Concern    Caffeine Concern Yes     Comment: 1-2 cans of soda weekly.     Stress Concern Yes    Weight Concern Yes    Special Diet No    Exercise Yes     Comment: Walks 1-2x/week    Seat Belt Yes     Social Determinants of Health      Received from [facility]     Occ: sales. : yes. Children: 0.   Exercise: minimal.  Diet: watches fats closely and watches sugar closely     REVIEW OF SYSTEMS:   GENERAL: feels well otherwise  SKIN: denies any unusual skin lesions  EYES:denies blurred vision or double vision  HEENT: denies nasal congestion, sinus pain or ST  LUNGS: denies shortness of breath with exertion  CARDIOVASCULAR: denies chest pain on exertion,+HTN  GI: denies abdominal pain,denies heartburn  : denies dysuria, vaginal discharge or itching  MUSCULOSKELETAL: denies back pain  NEURO: denies headaches  PSYCHE: + anxiety and insomnia  HEMATOLOGIC: denies hx of anemia  ENDOCRINE: denies thyroid history,+DM  ALL/ASTHMA: denies hx of allergy or asthma    EXAM:   /70 (BP Location: Left arm, Patient Position: Sitting, Cuff Size: adult)   Pulse 66   Temp 98.3 °F (36.8 °C) (Oral)   Resp 18   Ht 5' 5\" (1.651 m)   Wt 207 lb 6.4 oz (94.1 kg)   LMP  (LMP Unknown)   SpO2 98%   BMI 34.51 kg/m²   Body mass index is 34.51 kg/m².   GENERAL: well developed, well nourished,in no apparent distress  SKIN: no rashes,no suspicious lesions  HEENT: atraumatic, normocephalic,ears and throat are clear  EYES:PERRLA, EOMI, normal optic disk,conjunctiva are clear  NECK: supple,no adenopathy,no bruits  CHEST: no chest tenderness  BREAST: declined  exam  LUNGS: clear to auscultation  CARDIO: RRR without murmur  GI: good BS's,no masses, HSM or tenderness  :deferred  MUSCULOSKELETAL: back is not tender,FROM of the back  EXTREMITIES: no cyanosis, clubbing or edema  NEURO: Oriented times three,cranial nerves are intact,motor and sensory are grossly intact    ASSESSMENT AND PLAN:   Taryn Watt is a 58 year old female who presents for a complete physical exam.  Mammogram up to date.. Health maintenance, will check fasting Labs. Pt' s weight is Body mass index is 34.51 kg/m²., recommended low fat diet and aerobic exercise 30 minutes three times weekly.  The patient indicates understanding of these issues and agrees to the plan.  The patient is asked to return for CPX in one year.  1. Routine general medical examination at a health care facility    - CBC With Differential With Platelet; Future  - Comp Metabolic Panel (14); Future  - Lipid Panel; Future  - Vitamin D; Future  - TSH W Reflex To Free T4; Future    2. Hypertension, essential, benign  Conservative measures dicussed. Continue medication.  Diet and exercise explained and encouraged.  Fasting labs due.  Home blood pressure monitoring. Pt should measure BP’s two to three times per week. Goal blood pressure at home - < 135/85.     3. Hyperlipidemia LDL goal <100  Pt to continue their medication, increase exercise,  choose better fats,  increase fiber and avoid processed foods.      4. Hypothyroidism (acquired)    Patient at goal, no symptoms of over or under-replaced.  Recheck TSH is due.  Continue current dose of medication.      5. Anxiety  Stable, continue xanax only as needed    6. Insomnia, unspecified type  Increase dose to 10 mg at HS prn. - proper sleep hygiene was discussed: Regular sleep schedule,  no exercise before bed, use the bed only for sleep & avoidance of alcohol in the evening were all explained.  - zolpidem 10 MG Oral Tab; Take 1 tablet (10 mg total) by mouth nightly as needed for  Sleep.  Dispense: 90 tablet; Refill: 0    7. Chronic midline low back pain without sciatica  Stable, uses medication as needed.  - traMADol 50 MG Oral Tab; Take 1 tablet (50 mg total) by mouth every 6 (six) hours as needed for Pain.  Dispense: 30 tablet; Refill: 0

## 2024-06-20 NOTE — TELEPHONE ENCOUNTER
Requested Prescriptions     Pending Prescriptions Disp Refills    Insulin Aspart, w/Niacinamide, (FIASP FLEXTOUCH) 100 UNIT/ML Subcutaneous Solution Pen-injector [Pharmacy Med Name: FIASP 100 UNIT/ML FLEXTOUCH]  0     Sig: INJECT UP TO 10 UNITS DAILY AS DIRECTED IN DIVIDED DOSES BASED ON BLOOD GLUCOSE READING WHEN OFF PUMP     Your Appointments      Saturday June 22, 2024 8:00 AM  Adult Physical with Shi Gentile [facility] ([facility]) [address]  [facility] [id number] [phone]   PLEASE NOTE - Most insurances allow a Complete Physical once every 366 days. Please schedule accordingly.    Please arrive 15 minutes prior to your scheduled appointment. Please also bring your Insurance card, Photo ID, and your medication bottles or a list of your current medication.    If you no longer require this appointment, please contact your physician office to cancel.         Wednesday July 31, 2024 7:30 AM  Diabetes Pump follow up with Kathleen Buitrago [facility] ([facility]) [address]  [facility] [id number] [phone]             HGBA1C:    Lab Results   Component Value Date    A1C 6.3 (A) 03/13/2024    A1C 6.4 (H) 10/24/2023    A1C 6.9 (A) 07/12/2023     (H) 10/24/2023       Last OFFICE VISIT:  3----CB    Last Refill:  3----15 ml with 0 refills

## 2024-06-21 ENCOUNTER — PATIENT MESSAGE (OUTPATIENT)
Dept: ENDOCRINOLOGY CLINIC | Facility: CLINIC | Age: 59
End: 2024-06-21

## 2024-06-21 RX ORDER — INSULIN PMP CART,AUT,G6/7,CNTR
1 EACH SUBCUTANEOUS
Qty: 10 EACH | Refills: 3 | Status: SHIPPED | OUTPATIENT
Start: 2024-06-21

## 2024-06-21 RX ORDER — TIRZEPATIDE 5 MG/.5ML
5 INJECTION, SOLUTION SUBCUTANEOUS WEEKLY
Qty: 2 ML | Refills: 0 | Status: SHIPPED | OUTPATIENT
Start: 2024-06-21 | End: 2024-07-13

## 2024-06-21 RX ORDER — ACYCLOVIR 400 MG/1
1 TABLET ORAL
Qty: 3 EACH | Refills: 11
Start: 2024-06-21

## 2024-06-21 RX ORDER — INSULIN PMP CART,AUT,G6/7,CNTR
1 EACH SUBCUTANEOUS DAILY
Qty: 1 KIT | Refills: 0 | Status: SHIPPED | OUTPATIENT
Start: 2024-06-21 | End: 2024-06-21

## 2024-06-21 RX ORDER — INSULIN PMP CART,AUT,G6/7,CNTR
1 EACH SUBCUTANEOUS
Qty: 10 EACH | Refills: 0 | Status: SHIPPED | OUTPATIENT
Start: 2024-06-21

## 2024-06-21 RX ORDER — TIRZEPATIDE 2.5 MG/.5ML
2.5 INJECTION, SOLUTION SUBCUTANEOUS WEEKLY
Qty: 2 ML | Refills: 0 | Status: SHIPPED | OUTPATIENT
Start: 2024-06-21

## 2024-06-21 RX ORDER — INSULIN DEGLUDEC 100 U/ML
INJECTION, SOLUTION SUBCUTANEOUS
Qty: 3 ML | Refills: 0 | Status: SHIPPED | OUTPATIENT
Start: 2024-06-21

## 2024-06-21 NOTE — TELEPHONE ENCOUNTER
Patient advised has had higher readings, looking to start on OmniPod pump again.     Hyperglycemia triage:   Confirm current Diabetes medications with patient (not from chart note)   Fiasp Flex Touch up to 10 units a day  Jardiance 25mg once a day  Mounjaro 15mg weekly when able to find supply (has been off for 4 weeks)    Onset, frequency and duration of symptoms?   Last few weeks- had not been sleeping well and stressed related to 's medical procedures  Recent illness or steroid prescription/injection?   N/A  Obtain Blood sugar readings: BG log or CGM?  Streaming Dexcom-   Changes in diet? Changes in any medications?   N/A    -----------------------------  Dexcom Clarity  -----------------------------  Taryn Blisard  YOB: 1965  Generated at: Fri, Jun 21, 2024 9:19 AM CDT  Reporting period: Sat Jun 8, 2024 - Fri Jun 21, 2024  -----------------------------  Glucose Details  Average glucose: 208 mg/dL  Standard deviation: 52 mg/dL  GMI: N/A  -----------------------------  Time in Range  Very High: 21%  High: 41%  In Range: 38%  Low: 0%  Very Low: 0%    Target Range   mg/dL    -----------------------------  CGM Details  Sensor usage: 86%  Days with CGM data: 12/14

## 2024-06-21 NOTE — TELEPHONE ENCOUNTER
Since pt is already wearing G6 would prefer pt to stay w current OP5 \  my chart message sent to pt

## 2024-06-21 NOTE — TELEPHONE ENCOUNTER
Called [facility] to check on order status - they said they will have to run the prescription as O but they will be pods that are compatible with the g7    They will reach out to pt to schedule delivery

## 2024-06-21 NOTE — TELEPHONE ENCOUNTER
Called [facility] again for clarification - they can provide g6 pods that will be compatible with the g7 however they are not servicing g7 yet so they could not provide pt with the g7 intro kit. They do not know when they will be able to start servicing the g7 to patients

## 2024-06-21 NOTE — TELEPHONE ENCOUNTER
3-2024 pump settings:     Omni pod w humalog U 100   Basal rates:   12MN   0.95 /hour         Bolus settings:   IC ratio :  5   using set meal entries: 30-45 gm per meal      ISF:   25   BG target 120 mg/dl (correct above 120 mg/dl)   3 hour active insulin    Reverse correction OFF      Max basal: 1.5 u/hour  Max bolus 20 units

## 2024-06-21 NOTE — TELEPHONE ENCOUNTER
Pt off pump and was doing well until recently due to supply disruption of Mounjaro, stress.   Desires to go back on OP 5 - however lost controller -   Offered for pt to see if insurance would cover O controller/pods - can send to [facility]  If approved, will need Dexcom G7 sensors -    If not approved, pt will pay $150 for new controller    Will need pump settings provided    In meantime, recommend:  restart basal insulin: Tresba 12 units once daily   Fiasp :    Check dexcom :    180-240, dose 3 units   241-280, dose 4 units   Over 281, dose 5units    Restarting Mounjaro 2.5mg subcutaneous once weekly and increase based on tolerance/supply

## 2024-06-21 NOTE — TELEPHONE ENCOUNTER
Pt okay with trying for g7 - pended OP to [facility] and g7 to local pharmacy    Pended tresiba as she needs refill

## 2024-06-21 NOTE — TELEPHONE ENCOUNTER
From: Taryn Watt  To: Kathleen Buitrago  Sent: 6/21/2024 8:57 AM CDT  Subject: Omni Pod    Irving Wang, I think I am going to try wearing my pod again. My numbers have been high, and I seem to be struggling a bit. I have to locate my controller. I contacted [employer] and they said $150.00. Let me know if you think I should not wear it. Thank you,

## 2024-06-21 NOTE — TELEPHONE ENCOUNTER
CB is okay with keeping pt on G6 but she needs a new PDM, spoke with william at [facility] and she is checking with one of her pharmacists on pricing for a new g6 PDM

## 2024-06-22 ENCOUNTER — OFFICE VISIT (OUTPATIENT)
Dept: INTERNAL MEDICINE CLINIC | Facility: CLINIC | Age: 59
End: 2024-06-22

## 2024-06-22 VITALS
OXYGEN SATURATION: 98 % | DIASTOLIC BLOOD PRESSURE: 70 MMHG | WEIGHT: 207.38 LBS | HEART RATE: 66 BPM | RESPIRATION RATE: 18 BRPM | BODY MASS INDEX: 34.55 KG/M2 | TEMPERATURE: 98 F | SYSTOLIC BLOOD PRESSURE: 116 MMHG | HEIGHT: 65 IN

## 2024-06-22 DIAGNOSIS — G47.00 INSOMNIA, UNSPECIFIED TYPE: ICD-10-CM

## 2024-06-22 DIAGNOSIS — G89.29 CHRONIC MIDLINE LOW BACK PAIN WITHOUT SCIATICA: ICD-10-CM

## 2024-06-22 DIAGNOSIS — F41.9 ANXIETY: ICD-10-CM

## 2024-06-22 DIAGNOSIS — I10 HYPERTENSION, ESSENTIAL, BENIGN: ICD-10-CM

## 2024-06-22 DIAGNOSIS — E78.5 HYPERLIPIDEMIA LDL GOAL <100: ICD-10-CM

## 2024-06-22 DIAGNOSIS — M54.50 CHRONIC MIDLINE LOW BACK PAIN WITHOUT SCIATICA: ICD-10-CM

## 2024-06-22 DIAGNOSIS — Z00.00 ROUTINE GENERAL MEDICAL EXAMINATION AT A HEALTH CARE FACILITY: Primary | ICD-10-CM

## 2024-06-22 DIAGNOSIS — E03.9 HYPOTHYROIDISM (ACQUIRED): ICD-10-CM

## 2024-06-22 PROCEDURE — 99396 PREV VISIT EST AGE 40-64: CPT | Performed by: FAMILY MEDICINE

## 2024-06-22 PROCEDURE — 99213 OFFICE O/P EST LOW 20 MIN: CPT | Performed by: FAMILY MEDICINE

## 2024-06-22 RX ORDER — TRAMADOL HYDROCHLORIDE 50 MG/1
50 TABLET ORAL EVERY 6 HOURS PRN
Qty: 30 TABLET | Refills: 0 | Status: SHIPPED | OUTPATIENT
Start: 2024-06-22

## 2024-06-22 RX ORDER — ZOLPIDEM TARTRATE 10 MG/1
10 TABLET ORAL NIGHTLY PRN
Qty: 90 TABLET | Refills: 0 | Status: SHIPPED | OUTPATIENT
Start: 2024-06-22

## 2024-06-25 ENCOUNTER — TELEPHONE (OUTPATIENT)
Dept: ENDOCRINOLOGY CLINIC | Facility: CLINIC | Age: 59
End: 2024-06-25

## 2024-06-26 NOTE — TELEPHONE ENCOUNTER
MCM sent to pt regarding Omnipod G7 integration for current users.    Closing encounter, PA not needed at this time.

## 2024-06-28 DIAGNOSIS — E11.65 TYPE 2 DIABETES MELLITUS WITH HYPERGLYCEMIA, WITH LONG-TERM CURRENT USE OF INSULIN (HCC): ICD-10-CM

## 2024-06-28 DIAGNOSIS — Z79.4 TYPE 2 DIABETES MELLITUS WITH HYPERGLYCEMIA, WITH LONG-TERM CURRENT USE OF INSULIN (HCC): ICD-10-CM

## 2024-06-28 RX ORDER — EMPAGLIFLOZIN 25 MG/1
1 TABLET, FILM COATED ORAL DAILY
Qty: 90 TABLET | Refills: 0 | Status: SHIPPED | OUTPATIENT
Start: 2024-06-28

## 2024-06-28 NOTE — TELEPHONE ENCOUNTER
Requested Prescriptions     Pending Prescriptions Disp Refills    JARDIANCE 25 MG Oral Tab [Pharmacy Med Name: Jardiance Oral Tablet 25 MG] 90 tablet 0     Sig: TAKE 1 TABLET BY MOUTH EVERY DAY     Your appointments       Date & Time Appointment Department ([place])    Jul 31, 2024 7:30 AM CDT Diabetes Pump follow up with Kathleen Buitrago APRN [facility] ([facility])              [facility]  [address]  [place] [address]  [phone]          Last A1c value was 6.3% done 3/13/2024.    Refill 11/15/23  LOV 3/13/24

## 2024-07-01 NOTE — TELEPHONE ENCOUNTER
Voicemail from [facility] regarding paperwork they faxed.  Patient found PDM so does not need a new kit at this time.

## 2024-07-05 DIAGNOSIS — F41.9 ANXIETY: ICD-10-CM

## 2024-07-05 DIAGNOSIS — E03.9 HYPOTHYROIDISM (ACQUIRED): ICD-10-CM

## 2024-07-07 RX ORDER — LEVOTHYROXINE SODIUM 0.15 MG/1
150 TABLET ORAL
Qty: 30 TABLET | Refills: 0 | Status: SHIPPED | OUTPATIENT
Start: 2024-07-07

## 2024-07-07 RX ORDER — ALPRAZOLAM 0.5 MG/1
0.5 TABLET ORAL NIGHTLY PRN
Qty: 30 TABLET | Refills: 0 | Status: SHIPPED | OUTPATIENT
Start: 2024-07-07

## 2024-07-10 ENCOUNTER — OFFICE VISIT (OUTPATIENT)
Dept: INTERNAL MEDICINE CLINIC | Facility: CLINIC | Age: 59
End: 2024-07-10
Payer: COMMERCIAL

## 2024-07-10 ENCOUNTER — LAB ENCOUNTER (OUTPATIENT)
Dept: LAB | Age: 59
End: 2024-07-10
Attending: FAMILY MEDICINE
Payer: COMMERCIAL

## 2024-07-10 VITALS
TEMPERATURE: 98 F | DIASTOLIC BLOOD PRESSURE: 70 MMHG | BODY MASS INDEX: 34.62 KG/M2 | HEIGHT: 65 IN | WEIGHT: 207.81 LBS | OXYGEN SATURATION: 98 % | SYSTOLIC BLOOD PRESSURE: 118 MMHG | HEART RATE: 71 BPM

## 2024-07-10 DIAGNOSIS — R10.10 UPPER ABDOMINAL PAIN: Primary | ICD-10-CM

## 2024-07-10 DIAGNOSIS — Z00.00 ROUTINE GENERAL MEDICAL EXAMINATION AT A HEALTH CARE FACILITY: ICD-10-CM

## 2024-07-10 LAB
ALBUMIN SERPL-MCNC: 4.6 G/DL (ref 3.2–4.8)
ALBUMIN/GLOB SERPL: 1.5 {RATIO} (ref 1–2)
ALP LIVER SERPL-CCNC: 132 U/L
ALT SERPL-CCNC: 24 U/L
ANION GAP SERPL CALC-SCNC: 8 MMOL/L (ref 0–18)
AST SERPL-CCNC: 24 U/L (ref ?–34)
BASOPHILS # BLD AUTO: 0.04 X10(3) UL (ref 0–0.2)
BASOPHILS NFR BLD AUTO: 0.4 %
BILIRUB SERPL-MCNC: 1.2 MG/DL (ref 0.3–1.2)
BUN BLD-MCNC: 20 MG/DL (ref 9–23)
BUN/CREAT SERPL: 22.5 (ref 10–20)
CALCIUM BLD-MCNC: 9.4 MG/DL (ref 8.7–10.4)
CHLORIDE SERPL-SCNC: 106 MMOL/L (ref 98–112)
CHOLEST SERPL-MCNC: 169 MG/DL (ref ?–200)
CO2 SERPL-SCNC: 26 MMOL/L (ref 21–32)
CREAT BLD-MCNC: 0.89 MG/DL
DEPRECATED RDW RBC AUTO: 39.3 FL (ref 35.1–46.3)
EGFRCR SERPLBLD CKD-EPI 2021: 76 ML/MIN/1.73M2 (ref 60–?)
EOSINOPHIL # BLD AUTO: 0.31 X10(3) UL (ref 0–0.7)
EOSINOPHIL NFR BLD AUTO: 3.5 %
ERYTHROCYTE [DISTWIDTH] IN BLOOD BY AUTOMATED COUNT: 11.9 % (ref 11–15)
FASTING PATIENT LIPID ANSWER: YES
FASTING STATUS PATIENT QL REPORTED: YES
GLOBULIN PLAS-MCNC: 3 G/DL (ref 2–3.5)
GLUCOSE BLD-MCNC: 102 MG/DL (ref 70–99)
HCT VFR BLD AUTO: 46.2 %
HDLC SERPL-MCNC: 48 MG/DL (ref 40–59)
HGB BLD-MCNC: 15.5 G/DL
IMM GRANULOCYTES # BLD AUTO: 0.02 X10(3) UL (ref 0–1)
IMM GRANULOCYTES NFR BLD: 0.2 %
LDLC SERPL CALC-MCNC: 101 MG/DL (ref ?–100)
LYMPHOCYTES # BLD AUTO: 2.92 X10(3) UL (ref 1–4)
LYMPHOCYTES NFR BLD AUTO: 32.7 %
MCH RBC QN AUTO: 30 PG (ref 26–34)
MCHC RBC AUTO-ENTMCNC: 33.5 G/DL (ref 31–37)
MCV RBC AUTO: 89.5 FL
MONOCYTES # BLD AUTO: 0.57 X10(3) UL (ref 0.1–1)
MONOCYTES NFR BLD AUTO: 6.4 %
NEUTROPHILS # BLD AUTO: 5.07 X10 (3) UL (ref 1.5–7.7)
NEUTROPHILS # BLD AUTO: 5.07 X10(3) UL (ref 1.5–7.7)
NEUTROPHILS NFR BLD AUTO: 56.8 %
NONHDLC SERPL-MCNC: 121 MG/DL (ref ?–130)
OSMOLALITY SERPL CALC.SUM OF ELEC: 293 MOSM/KG (ref 275–295)
PLATELET # BLD AUTO: 264 10(3)UL (ref 150–450)
POTASSIUM SERPL-SCNC: 4 MMOL/L (ref 3.5–5.1)
PROT SERPL-MCNC: 7.6 G/DL (ref 5.7–8.2)
RBC # BLD AUTO: 5.16 X10(6)UL
SODIUM SERPL-SCNC: 140 MMOL/L (ref 136–145)
T3FREE SERPL-MCNC: 2.9 PG/ML (ref 2.4–4.2)
T4 FREE SERPL-MCNC: 1.4 NG/DL (ref 0.8–1.7)
TRIGL SERPL-MCNC: 110 MG/DL (ref 30–149)
TSI SER-ACNC: 0.45 MIU/ML (ref 0.55–4.78)
VIT D+METAB SERPL-MCNC: 37.5 NG/ML (ref 30–100)
VLDLC SERPL CALC-MCNC: 18 MG/DL (ref 0–30)
WBC # BLD AUTO: 8.9 X10(3) UL (ref 4–11)

## 2024-07-10 PROCEDURE — 80053 COMPREHEN METABOLIC PANEL: CPT

## 2024-07-10 PROCEDURE — 84443 ASSAY THYROID STIM HORMONE: CPT

## 2024-07-10 PROCEDURE — 85025 COMPLETE CBC W/AUTO DIFF WBC: CPT

## 2024-07-10 PROCEDURE — 84481 FREE ASSAY (FT-3): CPT

## 2024-07-10 PROCEDURE — 84439 ASSAY OF FREE THYROXINE: CPT

## 2024-07-10 PROCEDURE — 82306 VITAMIN D 25 HYDROXY: CPT

## 2024-07-10 PROCEDURE — 36415 COLL VENOUS BLD VENIPUNCTURE: CPT

## 2024-07-10 PROCEDURE — 80061 LIPID PANEL: CPT

## 2024-07-10 PROCEDURE — 99213 OFFICE O/P EST LOW 20 MIN: CPT | Performed by: FAMILY MEDICINE

## 2024-07-10 RX ORDER — TIRZEPATIDE 15 MG/.5ML
15 INJECTION, SOLUTION SUBCUTANEOUS WEEKLY
COMMUNITY
Start: 2024-06-23

## 2024-07-10 NOTE — PROGRESS NOTES
Taryn Watt is a 58 year old female who presents for abdominal pain.    The patient complaints of abdominal pain.  Pain is located at RUQ, LUQ, Epigastric. Pain is described as  sore and pressure . Severity is off and on, mainly after she eats . Associated symptoms: burping and bloating. The pain radiates to none.  Has had for  1-2   months. Pain is worsened by eating. Pt has not noticed a trigger food. Gets relief of pain with not eating. Pain lasts about an hour. Appetite is ok. Any weight loss: none. Prior abdomial pain hx: none. Pt has a hx of gallbladder disease in her family.   Denies: jaundice, abnormal discoloration of the stool or skin, no anorexia,  vomiting. No pruritis, no abdominal distention.No yellow discoloration of the sclera.  No travel, no sick contacts, no suspicious food.      Wt Readings from Last 6 Encounters:   07/10/24 207 lb 12.8 oz (94.3 kg)   06/22/24 207 lb 6.4 oz (94.1 kg)   03/13/24 205 lb (93 kg)   01/25/24 198 lb 8 oz (90 kg)   11/15/23 204 lb 6.4 oz (92.7 kg)   10/26/23 206 lb 3.2 oz (93.5 kg)     Body mass index is 34.58 kg/m².     Cholesterol, Total (mg/dL)   Date Value   10/24/2023 118   02/25/2023 104   03/03/2022 133     CHOLESTEROL, TOTAL (mg/dL)   Date Value   08/04/2012 146     CHOLESTEROL (mg/dL)   Date Value   02/08/2014 159     HDL Cholesterol (mg/dL)   Date Value   10/24/2023 35 (L)   02/25/2023 39 (L)   03/03/2022 41     HDL CHOLESTEROL (mg/dL)   Date Value   08/04/2012 35 (L)     HDL CHOL (mg/dL)   Date Value   02/08/2014 27 (L)     LDL Cholesterol (mg/dL)   Date Value   10/24/2023 60   02/25/2023 49   03/03/2022 67     LDL-CHOLESTEROL (mg/dL (calc))   Date Value   08/04/2012 93     LDL CHOLESTROL (mg/dL)   Date Value   02/08/2014 96     AST (U/L)   Date Value   10/24/2023 14 (L)   02/25/2023 25   03/03/2022 18   02/08/2014 20   02/04/2012 12   06/18/2011 14     ALT (U/L)   Date Value   10/24/2023 23   02/25/2023 41   03/03/2022 34   02/08/2014 42   02/04/2012 18    06/18/2011 19      Current Outpatient Medications   Medication Sig Dispense Refill    MOUNJARO 15 MG/0.5ML Subcutaneous Solution Pen-injector Inject 15 mg into the skin once a week.      LEVOTHYROXINE 150 MCG Oral Tab Take 1 tablet (150 mcg total) by mouth before breakfast. PHYSICAL DUE NOW 30 tablet 0    ALPRAZOLAM 0.5 MG Oral Tab TAKE 1 TABLET BY MOUTH AT NIGHT AS NEEDED 30 tablet 0    JARDIANCE 25 MG Oral Tab TAKE 1 TABLET BY MOUTH EVERY DAY 90 tablet 0    traMADol 50 MG Oral Tab Take 1 tablet (50 mg total) by mouth every 6 (six) hours as needed for Pain. 30 tablet 0    zolpidem 10 MG Oral Tab Take 1 tablet (10 mg total) by mouth nightly as needed for Sleep. 90 tablet 0    insulin degludec (TRESIBA FLEXTOUCH) 100 UNIT/ML Subcutaneous Solution Pen-injector Up to 12 units once daily when off pump only 3 mL 0    Insulin Disposable Pump (OMNIPOD 5 G7 PODS, GEN 5,) Does not apply Misc 1 each every 3 (three) days. 10 each 0    Insulin Disposable Pump (OMNIPOD 5 G6 PODS, GEN 5,) Does not apply Misc 1 each every 3 (three) days. 10 each 3    Insulin Aspart, w/Niacinamide, (FIASP FLEXTOUCH) 100 UNIT/ML Subcutaneous Solution Pen-injector INJECT UP TO 10 UNITS DAILY AS DIRECTED IN DIVIDED DOSES BASED ON BLOOD GLUCOSE READING WHEN OFF PUMP 15 mL 0    fexofenadine 180 MG Oral Tab Take 1 tablet (180 mg total) by mouth daily. 30 tablet 0    lisinopril-hydroCHLOROthiazide 20-12.5 MG Oral Tab Take 1 tablet by mouth daily. 90 tablet 0    Continuous Blood Gluc Sensor (DEXCOM G6 SENSOR) Does not apply Misc 1 each Every 10 days. Use as directed every 10 days 9 each 1    insulin lispro (HUMALOG) 100 UNIT/ML Injection Solution 50 Units by Insulin pump route daily. 50 mL 1    Continuous Blood Gluc Transmit (DEXCOM G6 TRANSMITTER) Does not apply Misc Use as directed with Dexcom G 6 sensor 1 each 3    orphenadrine  MG Oral Tablet 12 Hr Take 100 mg by mouth 2 (two) times daily as needed. 30 each 0    Metoprolol Succinate ER 25 MG  Oral Tablet 24 Hr Take 1 tablet (25 mg total) by mouth daily.      Albuterol Sulfate HFA (PROAIR HFA) 108 (90 BASE) MCG/ACT Inhalation Aero Soln Inhale 2 puffs into the lungs every 4 (four) hours as needed for Wheezing. 1 Inhaler 6    Tirzepatide (MOUNJARO) 2.5 MG/0.5ML Subcutaneous Solution Pen-injector Inject 2.5 mg into the skin once a week. DX E11.9 (Patient not taking: Reported on 7/10/2024) 2 mL 0    Tirzepatide (MOUNJARO) 5 MG/0.5ML Subcutaneous Solution Pen-injector Inject 5 mg into the skin once a week for 4 doses. (Patient not taking: Reported on 7/10/2024) 2 mL 0      Past Medical History:    Anxiety state, unspecified    Arrhythmia    SVT    Calculus of kidney    Essential hypertension, benign    High cholesterol    History of 2019 novel coronavirus disease (COVID-19)    not hospitalized, flu-like symptoms; lingering symptom: fogginess    Hypothyroidism    Dx at age 42    Type II or unspecified type diabetes mellitus without mention of complication, not stated as uncontrolled    Visual impairment    glasses      Past Surgical History:   Procedure Laterality Date    Colonoscopy N/A 7/23/2021    Procedure: COLONOSCOPY with forcep polypectomy, clip placement x1, hot snare polypectomy, and tattoo injection;  Surgeon: Acosta Barrera DO;  Location:  ENDOSCOPY    Other surgical history      [place] teeth extraction      Family History   Problem Relation Age of Onset    Diabetes Mother         Type 2 DM    Heart Disorder Mother         CHF and atrial fib    Diabetes Father     Diabetes Maternal Grandmother     Diabetes Brother         One brother with Type 2 DM    Thyroid Disorder Neg     Thyroid disease Neg       Social History:  Social History     Socioeconomic History    Marital status:     Number of children: 0   Occupational History    Occupation: Inventory Control     Comment: Warehouse    Occupation: [employer] worker   Tobacco Use    Smoking status: Never    Smokeless tobacco: Never   Vaping Use     Vaping status: Never Used   Substance and Sexual Activity    Alcohol use: Not Currently     Comment: rare    Drug use: Yes     Types: Cannabis     Comment: Very very rare uses marijuana gummies    Sexual activity: Yes   Other Topics Concern    Caffeine Concern Yes     Comment: 1-2 cans of soda weekly.     Stress Concern Yes    Weight Concern Yes    Special Diet No    Exercise Yes     Comment: Walks 1-2x/week    Seat Belt Yes     Social Determinants of Health      Received from [facility]         REVIEW OF SYSTEMS:   GENERAL: feels tired, no lethargy, no fever, no chills  SKIN: denies any unusual skin lesions, rash.  HEENT:no abnormal taste  LUNGS: denies shortness of breath with exertion  CARDIOVASCULAR: denies chest pain on exertion  GI: as above. No melena, no rectal bleeding.No vomiting.  :no dysuria.  MUSCULOSKELETAL: no myalgia  NEURO: denies headaches      EXAM:   /70   Pulse 71   Temp 97.8 °F (36.6 °C) (Temporal)   Ht 5' 5\" (1.651 m)   Wt 207 lb 12.8 oz (94.3 kg)   LMP  (LMP Unknown)   SpO2 98%   BMI 34.58 kg/m²   Body mass index is 34.58 kg/m².   GENERAL: well developed, well nourished,in no apparent distress  SKIN: no rashes,no suspicious lesions  HEENT:clear  EYES:PERRLA, EOMI  NECK: supple,no adenopathy  LUNGS: clear to auscultation  CARDIO: RRR without murmur  GI: good BS's,no masses, HSM or tenderness, Calles negative, no guarding, no Psoas sign. No hernias.  EXTREMITIES: no edema      ASSESSMENT AND PLAN:   Taryn Watt is a 58 year old female who presents for abdominal pain.    Encounter Diagnosis   Name Primary?    Upper abdominal pain Yes       No orders of the defined types were placed in this encounter.      Meds & Refills for this Visit:  Requested Prescriptions      No prescriptions requested or ordered in this encounter       Imaging & Consults:  US ABDOMEN COMPLETE (CPT=76700)    Dietary changes: bland diet, avoid greasy,fatty,spicy foods and dairy. Pt  should also watch her coffee intake  Diagnostic tests: abdominal ultrasound   Medications: none   The patient indicates understanding of these issues and agrees to the plan.  Patient to call if not improving.    I spent 20 minutes preparing to see the patient,reviewing patient's chart,results,history,medical decision making,placing orders,counseling/coordinating care and documenting in the chart.

## 2024-07-12 ENCOUNTER — TELEPHONE (OUTPATIENT)
Dept: INTERNAL MEDICINE CLINIC | Facility: CLINIC | Age: 59
End: 2024-07-12

## 2024-07-12 NOTE — TELEPHONE ENCOUNTER
[facility]  [facility]   CB# [phone]     Pharmacy has   levothyroxine 137 MCG Oral Tab 30 tablet   Sent out today.       And   levothyroxine 150 MCG Oral Tab 30 tablet   Sent out on 07/07 that has not been p/u by pt. Ok to discontinue?     Please advise.

## 2024-07-15 NOTE — TELEPHONE ENCOUNTER
Spoke with pharmacy and clarified that patient is to be on 137 mcg per last result notes    Pharmacist verbalized understanding and is in process of refilling medication

## 2024-07-18 ENCOUNTER — TELEPHONE (OUTPATIENT)
Dept: ENDOCRINOLOGY CLINIC | Facility: CLINIC | Age: 59
End: 2024-07-18

## 2024-07-18 NOTE — TELEPHONE ENCOUNTER
Received PA request for mounjaro, adry must call [phone] [facility].  Completed over phone, faxed chart note to [phone],confirmed.  Turn around time is two business days, we will be notified via fax.

## 2024-07-19 NOTE — TELEPHONE ENCOUNTER
Received fax from [email or web address] that PA for mounjaro was approved from 7/18/24 through 7/18/25. Sent letter to scan    Called [facility] - the rx is now going through as a paid claim and they can order it for her for tomorrow

## 2024-07-25 DIAGNOSIS — E78.5 HYPERLIPIDEMIA LDL GOAL <100: ICD-10-CM

## 2024-07-25 RX ORDER — ATORVASTATIN CALCIUM 20 MG/1
20 TABLET, FILM COATED ORAL DAILY
Qty: 90 TABLET | Refills: 0 | Status: SHIPPED | OUTPATIENT
Start: 2024-07-25

## 2024-07-30 NOTE — PROGRESS NOTES
No chief complaint on file.      Taryn is a 58 year old  presenting for  type 2 DM medication management.   Iain Baker MD,. Primary care physician   Recently saw Estella Gentile NP for upper abd pain; pending abd ultrasound      Today's A1C 6.3% ( last A1C 6.3%)       Last DM appt 3-2024; insulin pump was discontinued and sliding scale prandial insulin recommended since she was requiring minimal insulin due to weight loss/Mounjaro effect  However, pt started having disruptions in Mounjaro rx due to supply issues and trends were increasing   Resumed insulin pump 6-   Still TDD very low ~ 12 units/daily     Has been able to restart Mounjaro and back at 15mg weekly dose for past 2m   Prefers to stay on pump since it is more convenient for her with bolusing - having insulin on her at all times (v traveling w insulin pens)       Reviewed OP 5/CGM report/trends w patient today:   Dexcom (full download in media)   Sensor active time: 93 %    30 day GMI 6.6 %  Average glucose: 138 mg/dl   Hypoglycemia 1%  TIR 91 %  (ADA recommended goal > 70%)   Variability WNL ( < 33%)     100% automated mode  1% automated limited  0% manual mode       Pump download;   14 d average:   TDD  12.6  u/day (last upload 13.2  u/day)  Bolus: 6.7  u/day (last upload 7.2 u/day )  Basal: 6 u/day (last upload 6 u/day)             Diabetes History:  Type 2 DM ~ 2009    Patient has not had hospitalizations for blood sugar issues and denies any history of pancreatitis    Previous DM therapies:  Metformin- hives/rash  Glimiperide/glipzide: change in therapy    Chinyere CGM: skin rash w adhesive      Ozempic  2mg : lack of effect; change in therapy       Current DM Regimen:    Dexcom G6 CGM   Mounjaro 15 mg subcutaneous once weekly   Jardiance 25mg once daily      Omni pod 5  w humalog U 100   12MN   0.95 /hour       Bolus settings:   IC ratio :  5   ~Reminded to enter carbs at meals : using set meal entries: 30-45 gm per meal     ISF:   25    BG target 120 mg/dl (correct above 120 mg/dl)   3 hour active insulin      Max basal: 1.5 u/hour  Max bolus 20 units       Pump back up plan: Tresiba 12  units once daily  if off pump > 24 hours               HGBA1C:    Lab Results   Component Value Date    A1C 6.3 (A) 03/13/2024    A1C 6.4 (H) 10/24/2023    A1C 6.9 (A) 07/12/2023     (H) 10/24/2023        Lab Results   Component Value Date    CHOLEST 169 07/10/2024    CHOLEST 118 10/24/2023    TRIG 110 07/10/2024    TRIG 131 10/24/2023    HDL 48 07/10/2024    HDL 35 (L) 10/24/2023     (H) 07/10/2024    LDL 60 10/24/2023     Lab Results   Component Value Date    MICROALBCREA 8.8 03/13/2024    MICROALBCREA 10.8 02/25/2023      Lab Results   Component Value Date    CREATSERUM 0.89 07/10/2024    CREATSERUM 0.83 10/24/2023    EGFRCR 76 07/10/2024    EGFRCR 83 10/24/2023     Lab Results   Component Value Date    AST 24 07/10/2024    AST 14 (L) 10/24/2023    ALT 24 07/10/2024    ALT 23 10/24/2023       Lab Results   Component Value Date    TSH 0.450 (L) 07/10/2024    TSH 3.330 02/25/2023    T4F 1.4 07/10/2024           DM Complications:  Microvascular:   Neuropathy: yes, LE   Retinopathy: no  Nephropathy: no    Macrovascular:  PVD: no  CAD: no  Stroke/CVA: no    Modifying factors:  Medication adherence: yes   Recent steroids, illness or infections(past 3m)  No    Allergies: Glucophage [metformin hydrochloride]    Past Medical History:    Anxiety state, unspecified    Arrhythmia    SVT    Calculus of kidney    Essential hypertension, benign    High cholesterol    History of 2019 novel coronavirus disease (COVID-19)    not hospitalized, flu-like symptoms; lingering symptom: fogginess    Hypothyroidism    Dx at age 42    Type II or unspecified type diabetes mellitus without mention of complication, not stated as uncontrolled    Visual impairment    glasses     Past Surgical History:   Procedure Laterality Date    Colonoscopy N/A 7/23/2021    Procedure:  COLONOSCOPY with forcep polypectomy, clip placement x1, hot snare polypectomy, and tattoo injection;  Surgeon: Acosta Barrera DO;  Location:  ENDOSCOPY    Other surgical history      [place] teeth extraction     Social History     Socioeconomic History    Marital status:     Number of children: 0   Occupational History    Occupation: Inventory Control     Comment: Warehouse    Occupation: [employer] worker   Tobacco Use    Smoking status: Never    Smokeless tobacco: Never   Vaping Use    Vaping status: Never Used   Substance and Sexual Activity    Alcohol use: Not Currently     Comment: rare    Drug use: Yes     Types: Cannabis     Comment: Very very rare uses marijuana gummies    Sexual activity: Yes   Other Topics Concern    Caffeine Concern Yes     Comment: 1-2 cans of soda weekly.     Stress Concern Yes    Weight Concern Yes    Special Diet No    Exercise Yes     Comment: Walks 1-2x/week    Seat Belt Yes     Social Determinants of Health      [facility]     Family History   Problem Relation Age of Onset    Diabetes Mother         Type 2 DM    Heart Disorder Mother         CHF and atrial fib    Diabetes Father     Diabetes Maternal Grandmother     Diabetes Brother         One brother with Type 2 DM    Thyroid Disorder Neg     Thyroid disease Neg      Current Outpatient Medications   Medication Sig Dispense Refill    ATORVASTATIN 20 MG Oral Tab TAKE 1 TABLET BY MOUTH EVERY DAY 90 tablet 0    levothyroxine 137 MCG Oral Tab Take 137 mcg by mouth before breakfast. 30 tablet 1    MOUNJARO 15 MG/0.5ML Subcutaneous Solution Pen-injector Inject 15 mg into the skin once a week.      LEVOTHYROXINE 150 MCG Oral Tab Take 1 tablet (150 mcg total) by mouth before breakfast. PHYSICAL DUE NOW 30 tablet 0    ALPRAZOLAM 0.5 MG Oral Tab TAKE 1 TABLET BY MOUTH AT NIGHT AS NEEDED 30 tablet 0    JARDIANCE 25 MG Oral Tab TAKE 1 TABLET BY MOUTH EVERY DAY 90 tablet 0    traMADol 50 MG Oral Tab Take  1 tablet (50 mg total) by mouth every 6 (six) hours as needed for Pain. 30 tablet 0    zolpidem 10 MG Oral Tab Take 1 tablet (10 mg total) by mouth nightly as needed for Sleep. 90 tablet 0    Tirzepatide (MOUNJARO) 2.5 MG/0.5ML Subcutaneous Solution Pen-injector Inject 2.5 mg into the skin once a week. DX E11.9 (Patient not taking: Reported on 7/10/2024) 2 mL 0    Tirzepatide (MOUNJARO) 5 MG/0.5ML Subcutaneous Solution Pen-injector Inject 5 mg into the skin once a week for 4 doses. (Patient not taking: Reported on 7/10/2024) 2 mL 0    insulin degludec (TRESIBA FLEXTOUCH) 100 UNIT/ML Subcutaneous Solution Pen-injector Up to 12 units once daily when off pump only 3 mL 0    Insulin Disposable Pump (OMNIPOD 5 G7 PODS, GEN 5,) Does not apply Misc 1 each every 3 (three) days. 10 each 0    Insulin Disposable Pump (OMNIPOD 5 G6 PODS, GEN 5,) Does not apply Misc 1 each every 3 (three) days. 10 each 3    Insulin Aspart, w/Niacinamide, (FIASP FLEXTOUCH) 100 UNIT/ML Subcutaneous Solution Pen-injector INJECT UP TO 10 UNITS DAILY AS DIRECTED IN DIVIDED DOSES BASED ON BLOOD GLUCOSE READING WHEN OFF PUMP 15 mL 0    fexofenadine 180 MG Oral Tab Take 1 tablet (180 mg total) by mouth daily. 30 tablet 0    lisinopril-hydroCHLOROthiazide 20-12.5 MG Oral Tab Take 1 tablet by mouth daily. 90 tablet 0    Continuous Blood Gluc Sensor (DEXCOM G6 SENSOR) Does not apply Misc 1 each Every 10 days. Use as directed every 10 days 9 each 1    insulin lispro (HUMALOG) 100 UNIT/ML Injection Solution 50 Units by Insulin pump route daily. 50 mL 1    Continuous Blood Gluc Transmit (DEXCOM G6 TRANSMITTER) Does not apply Misc Use as directed with Dexcom G 6 sensor 1 each 3    orphenadrine  MG Oral Tablet 12 Hr Take 100 mg by mouth 2 (two) times daily as needed. 30 each 0    Metoprolol Succinate ER 25 MG Oral Tablet 24 Hr Take 1 tablet (25 mg total) by mouth daily.      Albuterol Sulfate HFA (PROAIR HFA) 108 (90 BASE) MCG/ACT Inhalation Aero Soln  Inhale 2 puffs into the lungs every 4 (four) hours as needed for Wheezing. 1 Inhaler 6     DM associated review of  symptoms:   Endocrine: Polyuria, polyphagia, polydipsia: no  Neurological: Paresthesias: no  HEENT: Blurred vision: no  Skin: no wounds or skin rash   Hematological: Hypoglycemia: no      Review of Systems   LUNGS: denies shortness of breath   CARDIOVASCULAR: denies chest pain  GI: denies abdominal pain, nausea  or diarrhea   : denies dysuria or mycotic infection sxs       Physical exam:  LMP  (LMP Unknown)   There is no height or weight on file to calculate BMI.      Physical Exam   Vitals reviewed.  Constitutional: Normal appearance   Cardiovascular: Normal rate   Pulmonary/Chest: Effort normal  Neurological: Alert and oriented .   Psychiatric: Normal mood and affect.        Assessment/Plan:    Hypertension   Well controlled   With weight loss and lower SBP, will decrease lisinopril   Lisinopril/hydrochlorothiazide 20/12.5 mg: --> 10mg/12.5mg : 1 tab daily   Has home monitor; advised to check on BP readings  Target < 140-90       Dyslipidemia   Last labs 7.2024  Increase in LDL; will recheck in 1-2025   Denies missing atorva; will continue Atorvastatin rx     Obesity   Continue lifestyle modifications and Mounjaro rx          Type 2 diabetes, uncontrolled, with neuropathy (HCC)  A1C: 6.3 %  (last A1C 6.3%)   Weight: 204  lb ( last weight 205 lb, 224, 237  lb  )   Diabetes control is stable;   needs ongoing management and evaluation  given the chronicity of Diabetes.  Reminded about treatment goals and benefits of improved glycemic control for her long term health   Dexcom g7 integration but also iphone integration available but pt would need to stay w Dexcom g6 if she prefers to use I phone for pump controls.        Continue:  Mounjaro 15 mg subcutaneous once weekly    Jardiance 25mg once daily    Omni pod 5  w humalog U 100 w Dexcom G6   12MN   0.95 /hour -> 0.5       Bolus settings:   IC ratio :   5   ~Reminded to enter carbs at meals : using set meal entries: 30- gm per meal     ISF:   25 --> 30   BG target 120 mg/dl (correct above 120 mg/dl)   3 hour active insulin    Reverse correction ON     Basal for pump back up: 12 units Tresiba once daily         Continue site rotation  site selection/rotation for pump sites to avoid lipodystrophy     Reminded pt on A1C and blood sugar targets (Fasting < 130 and post prandial <180 ) and complications associated with hyperglycemia and uncontrolled DM (on AUTUMN)  Recommended SMBG 4- x daily if not using CGM   Reviewed s/s and treatment of hypoglycemia (on AVS)   Continue lifestyle modifications since they have positive impact on diabetes/blood sugars/health (portion control, physical activity, weight loss)     The patient is asked to return in 5- m ,  but recommended to contact DM clinic sooner if questions or concerns.        No orders of the defined types were placed in this encounter.    DM quality  A1C/Blood pressure: as reported above   Nephropathy screening: 3-2024  continue ace /arb rx.   LIPID screenin     . atorvastatin rx.   Last dilated eye exam: No data recorded   Exam shows retinopathy? No data recorded  Last diabetic foot exam: Last Foot Exam: 24    Dr CASTRO - PREFERS TO MAKE APPT w her  Spent 45 min obtaining patient history, evaluating patient, reviewing blood glucose trends, discussing treatment options, lifestyle modifications and completing documentation -this time does not including sensor interpretation time  The risks and benefits of my recommendations, as well as other treatment options were discussed with the patient today. questions were also answered to the best of my knowledge.

## 2024-07-31 ENCOUNTER — PATIENT MESSAGE (OUTPATIENT)
Dept: ENDOCRINOLOGY CLINIC | Facility: CLINIC | Age: 59
End: 2024-07-31

## 2024-07-31 ENCOUNTER — TELEPHONE (OUTPATIENT)
Dept: ENDOCRINOLOGY CLINIC | Facility: CLINIC | Age: 59
End: 2024-07-31

## 2024-07-31 ENCOUNTER — OFFICE VISIT (OUTPATIENT)
Dept: ENDOCRINOLOGY CLINIC | Facility: CLINIC | Age: 59
End: 2024-07-31
Payer: COMMERCIAL

## 2024-07-31 VITALS
BODY MASS INDEX: 34 KG/M2 | DIASTOLIC BLOOD PRESSURE: 60 MMHG | OXYGEN SATURATION: 97 % | WEIGHT: 204 LBS | SYSTOLIC BLOOD PRESSURE: 118 MMHG | HEART RATE: 84 BPM | RESPIRATION RATE: 17 BRPM

## 2024-07-31 DIAGNOSIS — Z96.41 INSULIN PUMP IN PLACE: ICD-10-CM

## 2024-07-31 DIAGNOSIS — Z79.4 TYPE 2 DIABETES MELLITUS WITH HYPERGLYCEMIA, WITH LONG-TERM CURRENT USE OF INSULIN (HCC): Primary | ICD-10-CM

## 2024-07-31 DIAGNOSIS — E11.65 TYPE 2 DIABETES MELLITUS WITH HYPERGLYCEMIA, WITH LONG-TERM CURRENT USE OF INSULIN (HCC): Primary | ICD-10-CM

## 2024-07-31 DIAGNOSIS — I10 ESSENTIAL HYPERTENSION: ICD-10-CM

## 2024-07-31 DIAGNOSIS — E78.5 DYSLIPIDEMIA: ICD-10-CM

## 2024-07-31 LAB
CARTRIDGE LOT#: ABNORMAL NUMERIC
HEMOGLOBIN A1C: 6.3 % (ref 4.3–5.6)

## 2024-07-31 PROCEDURE — 83036 HEMOGLOBIN GLYCOSYLATED A1C: CPT | Performed by: NURSE PRACTITIONER

## 2024-07-31 PROCEDURE — 99215 OFFICE O/P EST HI 40 MIN: CPT | Performed by: NURSE PRACTITIONER

## 2024-07-31 PROCEDURE — 95251 CONT GLUC MNTR ANALYSIS I&R: CPT | Performed by: NURSE PRACTITIONER

## 2024-07-31 RX ORDER — INSULIN ASPART 100 [IU]/ML
INJECTION, SOLUTION INTRAVENOUS; SUBCUTANEOUS
Qty: 40 ML | Refills: 1 | Status: SHIPPED | OUTPATIENT
Start: 2024-07-31

## 2024-07-31 RX ORDER — LISINOPRIL AND HYDROCHLOROTHIAZIDE 12.5; 1 MG/1; MG/1
1 TABLET ORAL DAILY
Qty: 90 TABLET | Refills: 3 | Status: SHIPPED | OUTPATIENT
Start: 2024-07-31 | End: 2025-07-26

## 2024-07-31 RX ORDER — PROCHLORPERAZINE 25 MG/1
SUPPOSITORY RECTAL
Qty: 9 EACH | Refills: 1 | Status: SHIPPED | OUTPATIENT
Start: 2024-07-31

## 2024-07-31 RX ORDER — INSULIN LISPRO 100 [IU]/ML
INJECTION, SOLUTION INTRAVENOUS; SUBCUTANEOUS
Qty: 40 ML | Refills: 1 | Status: SHIPPED | OUTPATIENT
Start: 2024-07-31 | End: 2024-08-01

## 2024-07-31 NOTE — TELEPHONE ENCOUNTER
From: Taryn Watt  To: Kathleen Buitrago  Sent: 7/31/2024 8:33 AM CDT  Subject: Omni Pod    I did get an email today about the update.

## 2024-07-31 NOTE — PATIENT INSTRUCTIONS
A1C 6.3%   Update A1C /labs before your January 2025 appt  (blood and urine)   Change Lisinopril to 10/12.5mg once daily (new rx)         Continue   Mounjaro 15 mg subcutaneous once weekly   Jardiance 25mg once daily    omni pod pump  Contact me if you are having more hypo alerts on phone     I phone integration with Omni pod is coming soon- watch for emails   Also Dexcom G7 integration with Omni pod - but if you prefer iphone, please wait before updating the G7 dexcom         American Diabetes Association: blood sugar targets:     Fasting blood sugar (before breakfast) Target:    (ideally less than 110)  2 hours after eating less than 180 (ideally less than  150 )     Call for blood sugars less than  75 or greater than  200 more than 2 times in a week     If you are wearing the sensor, please look at your trends/averages    Recommendations:   GMI (estimated A1C ) target under  7%     Time in range (healthy blood sugar targets) : goal is over 70%   less than 70 : goal is less than 4%   Over 180 : goal is less than 20 %   Over 250: goal is  less than 5%       Watch for low blood sugars: (less than 70 )    Treatment of Low Blood Glucose Action Plan  1. Check blood glucose to be sure that it is low. You cannot  always go by symptoms or how you feel. If in doubt, treat your low blood glucose anyway.  Rule of 15 :     2. Take 15 grams of carbohydrate (carb). Here are some choices:    4 oz. regular fruit juice  3-4 glucose tablets  6 oz. regular soda   7-8 jelly beans    3. Recheck blood glucose after 10-15 minutes. If blood glucose is still low (less than 70 mg/dl) repeat the treatment (step 2).    4. If your next meal is more than one hour away, eat a small snack.    5. If you’re not sure what caused your low blood glucose, call your healthcare provider.    6. Always check your blood glucose before you drive       To treat a low, I recommend you carry with you easy, pre-portioned treatment for low blood sugars  that are 15G of carbs:   - Children sized squeeze pouch applesauce (high fiber + carbs help prevent too high of a spike)  - Small children's sized juicebox- 15g carb --> 4oz juice box  - Glucose tablets from [employer], you can find them near diabetes supplies --> Note, you will need to eat 3-4 tablets to get to 15g of carbs  - Children sized fruit snack pack- look for one with 15 grams of total carbohydrate    AVOID complex carbs, or foods that contain fats along with carbs (like chocolate) can slow the absorption of glucose and should NOT be used to treat an emergency low

## 2024-07-31 NOTE — PROGRESS NOTES
-----------------------------  Dexcom Clarity  -----------------------------  Taryn Zaptaaarianneeddi  YOB: 1965  Generated at: Wed, Jul 31, 2024 6:58 AM CDT  Reporting period: Tue Jul 2, 2024 - Wed Jul 31, 2024  -----------------------------  Glucose Details  Average glucose: 138 mg/dL  Standard deviation: 28 mg/dL  GMI: 6.6%  -----------------------------  Time in Range  Very High: 0%  High: 8%  In Range: 91%  Low: 1%  Very Low: <1%    Target Range   mg/dL    -----------------------------  CGM Details  Sensor usage: 93%  Days with CGM data: 28/30

## 2024-07-31 NOTE — TELEPHONE ENCOUNTER
Received PA for Humalog tried to do PA through [facility] put in pt information and reviewed no PA for Fiasp or Novolog can switch pt over if ok?    Fiasp is on back order can send in for novolog     Previous DM therapies:  Metformin- hives/rash  Glimiperide/glipzide: change in therapy    Chinyere CGM: skin rash w adhesive      Ozempic  2mg : lack of effect; change in therapy

## 2024-07-31 NOTE — TELEPHONE ENCOUNTER
Requested Prescriptions     Pending Prescriptions Disp Refills    DEXCOM G6 SENSOR Does not apply Misc [Pharmacy Med Name: DEXCOM G6 SENSOR]  1     Sig: USE AS DIRECTED EVERY 10 DAYS     HGBA1C:    Lab Results   Component Value Date    A1C 6.3 (A) 07/31/2024    A1C 6.3 (A) 03/13/2024    A1C 6.4 (H) 10/24/2023     (H) 10/24/2023     Your Appointments      Sunday August 04, 2024 8:15 AM  ULTRASOUND OF THE ABDOMEN EXAM with [facility] US 1  [facility] Ultrasound [facility] ([facility]) [address]  [facility] [id number] [phone]   Please arrive 15 minutes prior to the scheduled appointment time.    Fasting instructions for adults 18 years of age and older:    Please do not eat or drink anything for 8 hours prior to your exam. If you need to take oral medication in the morning, please take it with plain water only. Not following instructions may compromise your exam and you may need to reschedule.       Wednesday January 29, 2025 7:30 AM  Diabetes Pump follow up with FELY Camargo  Merged with [facility] ([facility]) [address]  [facility] [place] [id number] [phone]             Last OFFICE VISIT: 7-    Last Refill: 1--9 each with 1 refill

## 2024-08-05 RX ORDER — ORPHENADRINE CITRATE 100 MG/1
100 TABLET, EXTENDED RELEASE ORAL 2 TIMES DAILY PRN
Qty: 30 EACH | Refills: 0 | Status: SHIPPED | OUTPATIENT
Start: 2024-08-05

## 2024-08-05 NOTE — TELEPHONE ENCOUNTER
Orphenadrine  mg  Filled 9-19-22  Qty 30  0 refills  Future Appointments   Date Time Provider Department Center   8/28/2024  9:30 AM HERNANDEZ [facility] HERNANDEZ  Denis   1/29/2025  7:30 AM Kathleen Buitrago APRN [facility]

## 2024-08-07 DIAGNOSIS — F41.9 ANXIETY: ICD-10-CM

## 2024-08-07 NOTE — TELEPHONE ENCOUNTER
Sertraline 50 mg  Filled 6-17-24  Qty 30  0 refills  Future Appointments   Date Time Provider Department Center   8/28/2024  9:30 AM HERNANDEZ [facility] HERNANDEZ [place]   1/29/2025  7:30 AM Kathleen Buitrago APRN [facility] 6-22-24

## 2024-08-10 DIAGNOSIS — F41.9 ANXIETY: ICD-10-CM

## 2024-08-14 ENCOUNTER — TELEPHONE (OUTPATIENT)
Dept: ENDOCRINOLOGY CLINIC | Facility: CLINIC | Age: 59
End: 2024-08-14

## 2024-08-14 NOTE — TELEPHONE ENCOUNTER
Received fax for patient Novolog vials needed to be switched to pens pt is on OP at the moment keep vials as it once pt is office pump will send in flex pens then

## 2024-08-28 ENCOUNTER — HOSPITAL ENCOUNTER (OUTPATIENT)
Dept: ULTRASOUND IMAGING | Age: 59
Discharge: HOME | End: 2024-08-28
Attending: FAMILY MEDICINE
Payer: COMMERCIAL

## 2024-08-28 DIAGNOSIS — R10.10 UPPER ABDOMINAL PAIN: ICD-10-CM

## 2024-08-28 PROCEDURE — 76700 US EXAM ABDOM COMPLETE: CPT | Performed by: FAMILY MEDICINE

## 2024-08-28 NOTE — TELEPHONE ENCOUNTER
Requesting    Name from pharmacy: ORPHENADRINE  MG TABLET         Will file in chart as: ORPHENADRINE  MG Oral Tablet 12 Hr    Sig: TAKE 1 TABLET BY MOUTH TWICE A DAY AS NEEDED    Disp: 30 tablet    Refills: 0 (Pharmacy requested: Not specified)    Start: 8/28/2024    Class: Normal    Last ordered: 3 weeks ago (8/5/2024) by Iain Baker MD    Last refill: 8/5/2024    Rx #: [id number]        LOV: 6/22/2024  RTC: none noted   Last Relevant Labs: n/a   Filled: 8/5/2024 #30 with 0 refills    Future Appointments   Date Time Provider Department Center   1/29/2025  7:30 AM Kathleen Buitrago APRN [facility] [place]

## 2024-08-29 RX ORDER — ORPHENADRINE CITRATE 100 MG/1
100 TABLET, EXTENDED RELEASE ORAL 2 TIMES DAILY PRN
Qty: 30 TABLET | Refills: 0 | Status: SHIPPED | OUTPATIENT
Start: 2024-08-29

## 2024-09-05 DIAGNOSIS — E03.9 HYPOTHYROIDISM (ACQUIRED): ICD-10-CM

## 2024-09-05 RX ORDER — LEVOTHYROXINE SODIUM 137 UG/1
137 TABLET ORAL
Qty: 30 TABLET | Refills: 0 | Status: SHIPPED | OUTPATIENT
Start: 2024-09-05

## 2024-09-05 NOTE — TELEPHONE ENCOUNTER
Levothyroxine 137 mcg    Thyroid Medication Protocol Rxkkwd8009/05/2024 10:27 AM   Protocol Details Last TSH value is normal      Filled 7-12-24  Qty 30  1 refill  Future Appointments   Date Time Provider Department Center   1/29/2025  7:30 AM Kathleen Buitrago APRN [facility] [place]   LOV 6-22-24 SM  Labs needs repeat 9/12

## 2024-09-12 ENCOUNTER — LAB ENCOUNTER (OUTPATIENT)
Dept: LAB | Age: 59
End: 2024-09-12
Attending: FAMILY MEDICINE
Payer: COMMERCIAL

## 2024-09-12 DIAGNOSIS — F41.9 ANXIETY: ICD-10-CM

## 2024-09-12 DIAGNOSIS — G47.00 INSOMNIA, UNSPECIFIED TYPE: ICD-10-CM

## 2024-09-12 DIAGNOSIS — R74.8 ALKALINE PHOSPHATASE ELEVATION: ICD-10-CM

## 2024-09-12 DIAGNOSIS — E03.9 HYPOTHYROIDISM (ACQUIRED): ICD-10-CM

## 2024-09-12 LAB
ALBUMIN SERPL-MCNC: 4.5 G/DL (ref 3.2–4.8)
ALBUMIN/GLOB SERPL: 1.6 {RATIO} (ref 1–2)
ALP LIVER SERPL-CCNC: 128 U/L
ALT SERPL-CCNC: 18 U/L
ANION GAP SERPL CALC-SCNC: 8 MMOL/L (ref 0–18)
AST SERPL-CCNC: 18 U/L (ref ?–34)
BILIRUB SERPL-MCNC: 0.9 MG/DL (ref 0.3–1.2)
BUN BLD-MCNC: 18 MG/DL (ref 9–23)
CALCIUM BLD-MCNC: 10 MG/DL (ref 8.7–10.4)
CHLORIDE SERPL-SCNC: 103 MMOL/L (ref 98–112)
CO2 SERPL-SCNC: 27 MMOL/L (ref 21–32)
CREAT BLD-MCNC: 0.83 MG/DL
EGFRCR SERPLBLD CKD-EPI 2021: 83 ML/MIN/1.73M2 (ref 60–?)
FASTING STATUS PATIENT QL REPORTED: YES
GLOBULIN PLAS-MCNC: 2.8 G/DL (ref 2–3.5)
GLUCOSE BLD-MCNC: 162 MG/DL (ref 70–99)
OSMOLALITY SERPL CALC.SUM OF ELEC: 291 MOSM/KG (ref 275–295)
POTASSIUM SERPL-SCNC: 3.6 MMOL/L (ref 3.5–5.1)
PROT SERPL-MCNC: 7.3 G/DL (ref 5.7–8.2)
SODIUM SERPL-SCNC: 138 MMOL/L (ref 136–145)
TSI SER-ACNC: 1.22 MIU/ML (ref 0.55–4.78)

## 2024-09-12 PROCEDURE — 80053 COMPREHEN METABOLIC PANEL: CPT

## 2024-09-12 PROCEDURE — 36415 COLL VENOUS BLD VENIPUNCTURE: CPT

## 2024-09-12 PROCEDURE — 84443 ASSAY THYROID STIM HORMONE: CPT

## 2024-09-12 RX ORDER — ALPRAZOLAM 0.5 MG
0.5 TABLET ORAL NIGHTLY PRN
Qty: 30 TABLET | Refills: 0 | Status: SHIPPED | OUTPATIENT
Start: 2024-09-12

## 2024-09-12 RX ORDER — ZOLPIDEM TARTRATE 5 MG/1
5 TABLET ORAL NIGHTLY PRN
Qty: 30 TABLET | Refills: 0 | OUTPATIENT
Start: 2024-09-12

## 2024-09-12 RX ORDER — ZOLPIDEM TARTRATE 10 MG/1
10 TABLET ORAL NIGHTLY PRN
Qty: 90 TABLET | Refills: 0 | Status: SHIPPED | OUTPATIENT
Start: 2024-09-12

## 2024-09-12 NOTE — TELEPHONE ENCOUNTER
Zolpidem 5 mg was increased to 10 mg nightly.     Alprazolam 0.5 mg  Filled 7-7-24  Qty 30  0 refills  Future Appointments   Date Time Provider Department Center   1/29/2025  7:30 AM Kathleen Buitrago APRN [facility] [place]   LOV  6-22-24 SM    Zolpidem 10 mg  Filled 6-22-24  Qty 90  0 refills  Future Appointments   Date Time Provider Department Center   1/29/2025  7:30 AM Kathleen Buitrago APRN [facility] [place]   LOV 6-22-24 SM

## 2024-09-16 RX ORDER — ORPHENADRINE CITRATE 100 MG/1
100 TABLET, EXTENDED RELEASE ORAL 2 TIMES DAILY PRN
Qty: 30 TABLET | Refills: 0 | Status: SHIPPED | OUTPATIENT
Start: 2024-09-16

## 2024-09-16 NOTE — TELEPHONE ENCOUNTER
Requesting    Name from pharmacy: ORPHENADRINE  MG TABLET         Will file in chart as: ORPHENADRINE  MG Oral Tablet 12 Hr    Sig: TAKE 1 TABLET BY MOUTH TWICE A DAY AS NEEDED    Disp: 30 tablet    Refills: 0 (Pharmacy requested: Not specified)    Start: 9/15/2024    Class: Normal    Non-formulary    Last ordered: 2 weeks ago (8/29/2024) by Iain Baker MD    Last refill: 8/29/2024    Rx #: [id number]     LOV: 6/22/2024  RTC: none noted   Last Relevant Labs: n/a   Filled: 8/29/2024 #30 with 0 refills    Future Appointments   Date Time Provider Department Center   1/29/2025  7:30 AM Kathleen Buitrago APRN [facility] [place]

## 2024-09-23 DIAGNOSIS — E11.65 TYPE 2 DIABETES MELLITUS WITH HYPERGLYCEMIA, WITH LONG-TERM CURRENT USE OF INSULIN (HCC): ICD-10-CM

## 2024-09-23 DIAGNOSIS — Z79.4 TYPE 2 DIABETES MELLITUS WITH HYPERGLYCEMIA, WITH LONG-TERM CURRENT USE OF INSULIN (HCC): ICD-10-CM

## 2024-09-24 RX ORDER — EMPAGLIFLOZIN 25 MG/1
1 TABLET, FILM COATED ORAL DAILY
Qty: 90 TABLET | Refills: 2 | Status: SHIPPED | OUTPATIENT
Start: 2024-09-24

## 2024-09-24 NOTE — TELEPHONE ENCOUNTER
Requested Prescriptions     Pending Prescriptions Disp Refills    JARDIANCE 25 MG Oral Tab [Pharmacy Med Name: Jardiance Oral Tablet 25 MG] 90 tablet 0     Sig: TAKE 1 TABLET BY MOUTH EVERY DAY     Future Appointments   Date Time Provider Department Center   1/29/2025  7:30 AM Kathleen Buitrago APRN [facility] [place]     Last A1c value was 6.3% done 7/31/2024.  Refill 6/28/24 Jayden   LOV 7/31/24 Jayden

## 2024-09-30 RX ORDER — ORPHENADRINE CITRATE 100 MG/1
100 TABLET, EXTENDED RELEASE ORAL 2 TIMES DAILY PRN
Qty: 30 TABLET | Refills: 0 | Status: SHIPPED | OUTPATIENT
Start: 2024-09-30

## 2024-10-05 ENCOUNTER — LAB ENCOUNTER (OUTPATIENT)
Dept: LAB | Age: 59
End: 2024-10-05
Attending: FAMILY MEDICINE
Payer: COMMERCIAL

## 2024-10-05 DIAGNOSIS — R74.8 ELEVATED ALKALINE PHOSPHATASE LEVEL: ICD-10-CM

## 2024-10-05 PROCEDURE — 84075 ASSAY ALKALINE PHOSPHATASE: CPT

## 2024-10-05 PROCEDURE — 36415 COLL VENOUS BLD VENIPUNCTURE: CPT

## 2024-10-05 PROCEDURE — 84080 ASSAY ALKALINE PHOSPHATASES: CPT

## 2024-10-07 LAB
ALK PHOSPHATASE: 137 IU/L
BONE FRAC: 41 %
INTESTINAL FRAC: 7 %
LIVER FRAC: 52 %

## 2024-10-07 RX ORDER — TIRZEPATIDE 15 MG/.5ML
15 INJECTION, SOLUTION SUBCUTANEOUS WEEKLY
Qty: 6 ML | Refills: 1 | Status: SHIPPED | OUTPATIENT
Start: 2024-10-07

## 2024-10-07 NOTE — TELEPHONE ENCOUNTER
Requested Prescriptions     Pending Prescriptions Disp Refills    MOUNJARO 15 MG/0.5ML Subcutaneous Solution Pen-injector [Pharmacy Med Name: Mounjaro Subcutaneous Solution Auto-injector 15 MG/0.5ML] 6 mL 0     Sig: Inject 15 mg into the skin once a week.     Future Appointments   Date Time Provider Department Center   1/29/2025  7:30 AM Kathleen Buitrago, FELY [facility] [place]     Last A1c value was 6.3% done 7/31/2024.  Refill 6/23/24 Jayden  LOV 7/31/24 Jayden

## 2024-10-11 RX ORDER — ORPHENADRINE CITRATE 100 MG/1
100 TABLET, EXTENDED RELEASE ORAL 2 TIMES DAILY PRN
Qty: 30 TABLET | Refills: 0 | Status: SHIPPED | OUTPATIENT
Start: 2024-10-11

## 2024-10-16 ENCOUNTER — PATIENT MESSAGE (OUTPATIENT)
Dept: INTERNAL MEDICINE CLINIC | Facility: CLINIC | Age: 59
End: 2024-10-16

## 2024-10-16 DIAGNOSIS — R41.3 MEMORY DIFFICULTY: Primary | ICD-10-CM

## 2024-10-27 DIAGNOSIS — E03.9 HYPOTHYROIDISM (ACQUIRED): ICD-10-CM

## 2024-10-28 RX ORDER — LEVOTHYROXINE SODIUM 137 UG/1
137 TABLET ORAL
Qty: 90 TABLET | Refills: 1 | Status: SHIPPED | OUTPATIENT
Start: 2024-10-28

## 2024-10-28 NOTE — TELEPHONE ENCOUNTER
Levothyroxine 137 mcg  Filled 9-5-24  Qty 30  0 refills  Future Appointments   Date Time Provider Department Center   1/29/2025  7:30 AM Kathleen Buitrago APRN [facility] [place]   LOV 6-22-24   Labs 9-12-24 TSH W REFLEX

## 2024-10-31 ENCOUNTER — TELEPHONE (OUTPATIENT)
Age: 59
End: 2024-10-31

## 2024-10-31 ENCOUNTER — PATIENT MESSAGE (OUTPATIENT)
Dept: ENDOCRINOLOGY CLINIC | Facility: CLINIC | Age: 59
End: 2024-10-31

## 2024-10-31 RX ORDER — INSULIN PMP CART,AUT,G6/7,CNTR
1 EACH SUBCUTANEOUS
Qty: 10 EACH | Refills: 5 | Status: SHIPPED | OUTPATIENT
Start: 2024-10-31

## 2024-10-31 NOTE — TELEPHONE ENCOUNTER
Received Prior Authorization request for OP5 pods, completed on Cover My Meds.    Key: [id number]  PA Case ID #: [id number]  Rx #: [id number]  Need Help? Call us at [phone]  Outcome    PA has been Approved. PA End Date: 10/31/2025  Left voicemail for pharmacy.

## 2024-10-31 NOTE — TELEPHONE ENCOUNTER
LOV: 07/2024  Future Appointments   Date Time Provider Department Center   1/29/2025  7:30 AM Kathleen Buitrago APRN [facility] [place]     Last A1c value was 6.3% done 7/31/2024.

## 2024-11-01 RX ORDER — ACYCLOVIR 400 MG/1
1 TABLET ORAL
Qty: 9 EACH | Refills: 1 | Status: SHIPPED | OUTPATIENT
Start: 2024-11-01

## 2024-11-04 DIAGNOSIS — E78.5 HYPERLIPIDEMIA LDL GOAL <100: ICD-10-CM

## 2024-11-04 RX ORDER — INSULIN ASPART 100 [IU]/ML
INJECTION, SOLUTION INTRAVENOUS; SUBCUTANEOUS
Qty: 40 ML | Refills: 1 | Status: SHIPPED | OUTPATIENT
Start: 2024-11-04

## 2024-11-04 RX ORDER — ATORVASTATIN CALCIUM 20 MG/1
20 TABLET, FILM COATED ORAL DAILY
Qty: 90 TABLET | Refills: 0 | Status: SHIPPED | OUTPATIENT
Start: 2024-11-04

## 2024-11-04 NOTE — TELEPHONE ENCOUNTER
Pended Novolog per patient to be sent to [facility]   Requested Prescriptions     Pending Prescriptions Disp Refills    insulin aspart (NOVOLOG) 100 Units/mL Injection Solution 40 mL 1     Sig: Uses up to 40 units daily via insulin pump     Signed Prescriptions Disp Refills    Insulin Disposable Pump (OMNIPOD 5 G7 PODS, GEN 5,) Does not apply Misc 10 each 5     Si each every 3 (three) days.     Authorizing Provider: KATHLEEN ELLIOTT     Ordering User: EFRA CASEY    Continuous Glucose Sensor (DEXCOM G7 SENSOR) Does not apply Misc 9 each 1     Si each Every 10 days. Use as directed every 10 days     Authorizing Provider: KATHLEEN ELLIOTT     Future Appointments   Date Time Provider Department Center   2025  7:30 AM Kathleen Elliott APRN [facility] [place]        WDL

## 2024-11-11 DIAGNOSIS — F41.9 ANXIETY: ICD-10-CM

## 2024-11-12 RX ORDER — ALPRAZOLAM 0.5 MG
0.5 TABLET ORAL NIGHTLY PRN
Qty: 30 TABLET | Refills: 0 | Status: SHIPPED | OUTPATIENT
Start: 2024-11-12

## 2024-11-18 ENCOUNTER — TELEPHONE (OUTPATIENT)
Dept: INTERNAL MEDICINE CLINIC | Facility: CLINIC | Age: 59
End: 2024-11-18

## 2024-11-18 NOTE — TELEPHONE ENCOUNTER
Incoming fax from [facility]     Patients Diabetic eye exam done on 11/16/2024 by     No Retinopathy detected     Placed in TO in basket for review

## 2024-11-26 DIAGNOSIS — F41.9 ANXIETY: ICD-10-CM

## 2024-11-26 NOTE — TELEPHONE ENCOUNTER
Sertraline 50 mg  Filled 8-11-24  Qty 30  2 refills  Future Appointments   Date Time Provider Department Center   1/29/2025  7:30 AM Kathleen Buitrago APRN [facility] 6-22-24

## 2024-11-30 DIAGNOSIS — G89.29 CHRONIC MIDLINE LOW BACK PAIN WITHOUT SCIATICA: ICD-10-CM

## 2024-11-30 DIAGNOSIS — M54.50 CHRONIC MIDLINE LOW BACK PAIN WITHOUT SCIATICA: ICD-10-CM

## 2024-11-30 RX ORDER — TRAMADOL HYDROCHLORIDE 50 MG/1
50 TABLET ORAL EVERY 6 HOURS PRN
Qty: 30 TABLET | Refills: 0 | OUTPATIENT
Start: 2024-11-30

## 2024-11-30 RX ORDER — ORPHENADRINE CITRATE 100 MG/1
100 TABLET ORAL 2 TIMES DAILY PRN
Qty: 30 TABLET | Refills: 0 | Status: SHIPPED | OUTPATIENT
Start: 2024-11-30

## 2024-12-02 ENCOUNTER — LAB ENCOUNTER (OUTPATIENT)
Dept: LAB | Age: 59
End: 2024-12-02
Attending: FAMILY MEDICINE
Payer: COMMERCIAL

## 2024-12-02 DIAGNOSIS — R41.3 MEMORY DIFFICULTY: ICD-10-CM

## 2024-12-02 LAB — VIT B12 SERPL-MCNC: 607 PG/ML (ref 211–911)

## 2024-12-02 PROCEDURE — 36415 COLL VENOUS BLD VENIPUNCTURE: CPT

## 2024-12-02 PROCEDURE — 82607 VITAMIN B-12: CPT

## 2024-12-08 DIAGNOSIS — M54.50 CHRONIC MIDLINE LOW BACK PAIN WITHOUT SCIATICA: ICD-10-CM

## 2024-12-08 DIAGNOSIS — G89.29 CHRONIC MIDLINE LOW BACK PAIN WITHOUT SCIATICA: ICD-10-CM

## 2024-12-09 RX ORDER — TRAMADOL HYDROCHLORIDE 50 MG/1
50 TABLET ORAL EVERY 6 HOURS PRN
Qty: 30 TABLET | Refills: 0 | Status: SHIPPED | OUTPATIENT
Start: 2024-12-09

## 2024-12-16 DIAGNOSIS — F41.9 ANXIETY: ICD-10-CM

## 2024-12-16 RX ORDER — ALPRAZOLAM 0.5 MG
0.5 TABLET ORAL NIGHTLY PRN
Qty: 30 TABLET | Refills: 0 | Status: SHIPPED | OUTPATIENT
Start: 2024-12-16

## 2024-12-16 RX ORDER — ORPHENADRINE CITRATE 100 MG/1
100 TABLET ORAL 2 TIMES DAILY PRN
Qty: 30 TABLET | Refills: 0 | Status: SHIPPED | OUTPATIENT
Start: 2024-12-16

## 2024-12-16 NOTE — TELEPHONE ENCOUNTER
Orphenadrine  mg  Filled 11-30-24  Qty 30  0 refills  Future Appointments   Date Time Provider Department Center   1/29/2025  7:30 AM Kathleen Buitrago APRN [facility] 6-22-24

## 2024-12-16 NOTE — TELEPHONE ENCOUNTER
Alprazolam 0.5 mg  Filled 11-12-24  Qty 30  0 refills  Future Appointments   Date Time Provider Department Center   1/29/2025  7:30 AM Kathleen Buitrago APR[facility] 6-[facility]

## 2024-12-28 ENCOUNTER — HOSPITAL ENCOUNTER (OUTPATIENT)
Age: 59
Discharge: LEFT BEFORE DISCHARGE | End: 2024-12-28
Payer: COMMERCIAL

## 2024-12-28 RX ORDER — ORPHENADRINE CITRATE 100 MG/1
100 TABLET ORAL 2 TIMES DAILY PRN
Qty: 30 TABLET | Refills: 0 | Status: SHIPPED | OUTPATIENT
Start: 2024-12-28

## 2025-01-09 DIAGNOSIS — E78.5 HYPERLIPIDEMIA LDL GOAL <100: ICD-10-CM

## 2025-01-10 RX ORDER — ATORVASTATIN CALCIUM 20 MG/1
20 TABLET, FILM COATED ORAL DAILY
Qty: 90 TABLET | Refills: 0 | Status: SHIPPED | OUTPATIENT
Start: 2025-01-10

## 2025-01-10 NOTE — TELEPHONE ENCOUNTER
Requesting    Name from pharmacy: Atorvastatin Calcium 20mg Tablet         Will file in chart as: ATORVASTATIN 20 MG Oral Tab    Sig: Take 1 tablet by mouth daily.    Disp: 90 tablet    Refills: 0 (Pharmacy requested: Not specified)    Start: 1/9/2025    Class: Normal    Non-formulary For: Hyperlipidemia LDL goal <100    Last ordered: 2 months ago (11/4/2024) by FELY Lima    Last refill: 11/5/2024    Rx #: [id number]    Cholesterol Medication Protocol Dnklkx6701/09/2025 05:27 PM   Protocol Details ALT < 80    ALT resulted within past year    Lipid panel within past 12 months    In person appointment or virtual visit in the past 12 mos or appointment in next 3 mos    Medication is active on med list        LOV: 7/10/2024  RTC: none noted   Last Relevant Labs: 7/10/2024  Filled: 11/4/2024 #90 with 0 refills    Future Appointments   Date Time Provider Department Center   1/29/2025  7:30 AM Kathleen Buitrago APRN [facility] [place]

## 2025-01-14 RX ORDER — ORPHENADRINE CITRATE 100 MG/1
100 TABLET ORAL 2 TIMES DAILY PRN
Qty: 60 TABLET | Refills: 0 | Status: SHIPPED | OUTPATIENT
Start: 2025-01-14

## 2025-01-21 DIAGNOSIS — F41.9 ANXIETY: ICD-10-CM

## 2025-01-21 DIAGNOSIS — E11.65 TYPE 2 DIABETES MELLITUS WITH HYPERGLYCEMIA, WITH LONG-TERM CURRENT USE OF INSULIN (HCC): Primary | ICD-10-CM

## 2025-01-21 DIAGNOSIS — Z79.4 TYPE 2 DIABETES MELLITUS WITH HYPERGLYCEMIA, WITH LONG-TERM CURRENT USE OF INSULIN (HCC): Primary | ICD-10-CM

## 2025-01-21 NOTE — TELEPHONE ENCOUNTER
Sertraline 50 mg    Psychiatric Non-Scheduled (Anti-Anxiety) Nallmf9501/21/2025 12:56 PM   Protocol Details In person appointment or virtual visit in the past 6 mos or appointment in next 3 mos      Filled 11-26-24  Qty 30  1 refill  Future Appointments   Date Time Provider Department Center   1/29/2025  7:30 AM Kathleen Buitrago APRN [facility] [place]   2/15/2025  9:00 AM [facility]DEZ [facility]

## 2025-01-22 NOTE — TELEPHONE ENCOUNTER
Pt is over due for labs that were ordered from Kathleen Buitrago 7/31/24 and due for an A1c this month.     A1c pending.    Future Appointments   Date Time Provider Department Center   1/29/2025  7:30 AM Kathleen Buitrago APRN [facility]   2/8/2025  9:30 AM Shi Gentile APRN [facility] 8 [facility] [place]   2/15/2025  9:00 AM HERNANDEZ [facility] [place]

## 2025-01-25 ENCOUNTER — LAB ENCOUNTER (OUTPATIENT)
Dept: LAB | Age: 60
End: 2025-01-25
Attending: NURSE PRACTITIONER
Payer: COMMERCIAL

## 2025-01-25 DIAGNOSIS — E11.65 TYPE 2 DIABETES MELLITUS WITH HYPERGLYCEMIA, WITH LONG-TERM CURRENT USE OF INSULIN (HCC): ICD-10-CM

## 2025-01-25 DIAGNOSIS — R74.8 ELEVATED ALKALINE PHOSPHATASE LEVEL: ICD-10-CM

## 2025-01-25 DIAGNOSIS — Z79.4 TYPE 2 DIABETES MELLITUS WITH HYPERGLYCEMIA, WITH LONG-TERM CURRENT USE OF INSULIN (HCC): ICD-10-CM

## 2025-01-25 LAB
ALBUMIN SERPL-MCNC: 4.4 G/DL (ref 3.2–4.8)
ALBUMIN/GLOB SERPL: 1.3 {RATIO} (ref 1–2)
ALP LIVER SERPL-CCNC: 136 U/L
ALT SERPL-CCNC: 22 U/L
ANION GAP SERPL CALC-SCNC: 7 MMOL/L (ref 0–18)
AST SERPL-CCNC: 17 U/L (ref ?–34)
BILIRUB SERPL-MCNC: 0.9 MG/DL (ref 0.3–1.2)
BUN BLD-MCNC: 16 MG/DL (ref 9–23)
CALCIUM BLD-MCNC: 9.5 MG/DL (ref 8.7–10.6)
CHLORIDE SERPL-SCNC: 103 MMOL/L (ref 98–112)
CHOLEST SERPL-MCNC: 170 MG/DL (ref ?–200)
CO2 SERPL-SCNC: 29 MMOL/L (ref 21–32)
CREAT BLD-MCNC: 0.83 MG/DL
CREAT UR-SCNC: 160.6 MG/DL
EGFRCR SERPLBLD CKD-EPI 2021: 82 ML/MIN/1.73M2 (ref 60–?)
EST. AVERAGE GLUCOSE BLD GHB EST-MCNC: 131 MG/DL (ref 68–126)
FASTING PATIENT LIPID ANSWER: YES
FASTING STATUS PATIENT QL REPORTED: YES
GLOBULIN PLAS-MCNC: 3.4 G/DL (ref 2–3.5)
GLUCOSE BLD-MCNC: 144 MG/DL (ref 70–99)
HBA1C MFR BLD: 6.2 % (ref ?–5.7)
HDLC SERPL-MCNC: 43 MG/DL (ref 40–59)
LDLC SERPL CALC-MCNC: 107 MG/DL (ref ?–100)
MICROALBUMIN UR-MCNC: 1.3 MG/DL
MICROALBUMIN/CREAT 24H UR-RTO: 8.1 UG/MG (ref ?–30)
NONHDLC SERPL-MCNC: 127 MG/DL (ref ?–130)
OSMOLALITY SERPL CALC.SUM OF ELEC: 292 MOSM/KG (ref 275–295)
POTASSIUM SERPL-SCNC: 3.8 MMOL/L (ref 3.5–5.1)
PROT SERPL-MCNC: 7.8 G/DL (ref 5.7–8.2)
SODIUM SERPL-SCNC: 139 MMOL/L (ref 136–145)
TRIGL SERPL-MCNC: 108 MG/DL (ref 30–149)
TSI SER-ACNC: 1.38 UIU/ML (ref 0.55–4.78)
VLDLC SERPL CALC-MCNC: 18 MG/DL (ref 0–30)

## 2025-01-25 PROCEDURE — 80061 LIPID PANEL: CPT

## 2025-01-25 PROCEDURE — 82043 UR ALBUMIN QUANTITATIVE: CPT

## 2025-01-25 PROCEDURE — 82570 ASSAY OF URINE CREATININE: CPT

## 2025-01-25 PROCEDURE — 36415 COLL VENOUS BLD VENIPUNCTURE: CPT

## 2025-01-25 PROCEDURE — 80053 COMPREHEN METABOLIC PANEL: CPT

## 2025-01-25 PROCEDURE — 83036 HEMOGLOBIN GLYCOSYLATED A1C: CPT

## 2025-01-25 PROCEDURE — 84443 ASSAY THYROID STIM HORMONE: CPT

## 2025-01-26 DIAGNOSIS — R74.8 ELEVATED ALKALINE PHOSPHATASE LEVEL: ICD-10-CM

## 2025-01-26 DIAGNOSIS — E11.65 TYPE 2 DIABETES MELLITUS WITH HYPERGLYCEMIA, WITH LONG-TERM CURRENT USE OF INSULIN (HCC): Primary | ICD-10-CM

## 2025-01-26 DIAGNOSIS — Z79.4 TYPE 2 DIABETES MELLITUS WITH HYPERGLYCEMIA, WITH LONG-TERM CURRENT USE OF INSULIN (HCC): Primary | ICD-10-CM

## 2025-01-28 NOTE — PROGRESS NOTES
Chief Complaint   Patient presents with    Diabetes     Follow up streaming Dex/OP5        Taryn is a 59 year old  presenting for  type 2 DM medication management.   Iain Baker MD,. Primary care physician   Last Diabetes appointment 7-2024 and decreased lisinopril/hydrochlorothiazide prescription     Most recent  A1C 6.2% ( last A1C 6.3%)   Low insulin usage but pt prefers to stay on pump since it is more convenient for her with bolusing - having insulin on her at all times (v traveling w insulin pens)   Upgraded to Dexcom G7     Recent labs - reviewed today   LDL still above goal; reports adherence w atorvastatin prescription.         Reviewed OP 5/CGM report/trends w patient today:   Dexcom (full download in media)   Sensor active time 97  %    30 day GMI 6.8 %  Average glucose:145 mg/dl   Hypoglycemia 0%  TIR 88  %  (ADA recommended goal > 70%)   Variability WNL ( < 33%)     100% automated mode  4% automated limited  0% manual mode       Pump download;   14 d average:   TDD  5.7   u/day (last upload 12.6   u/day)  Bolus: 3.3   u/day (last upload 6.7u/day )  Basal: 2.4  u/day (last upload 6 u/day)             Diabetes History:  Type 2 DM ~ 2009    Patient has not had hospitalizations for blood sugar issues and denies any history of pancreatitis    Previous DM therapies:  Metformin- hives/rash  Glimiperide/glipzide: change in therapy    Chinyere CGM: skin rash w adhesive      Ozempic  2mg : lack of effect; change in therapy       Current DM Regimen:    Dexcom G7 CGM   Mounjaro 15 mg subcutaneous once weekly   Jardiance 25mg once daily      Omni pod 5  w NOVOLOG    12MN   0.95 /hour       Bolus settings:   IC ratio :  5       ISF:   25   BG target 120 mg/dl (correct above 120 mg/dl)   3 hour active insulin      Max basal: 1.5 u/hour  Max bolus 20 units       Pump back up plan: Tresiba  8  units once daily  if off pump > 24 hours               HGBA1C:    Lab Results   Component Value Date    A1C 6.2 (H)  01/25/2025    A1C 6.3 (A) 07/31/2024    A1C 6.3 (A) 03/13/2024     (H) 01/25/2025        Lab Results   Component Value Date    CHOLEST 170 01/25/2025    CHOLEST 169 07/10/2024    TRIG 108 01/25/2025    TRIG 110 07/10/2024    HDL 43 01/25/2025    HDL 48 07/10/2024     (H) 01/25/2025     (H) 07/10/2024     Lab Results   Component Value Date    MICROALBCREA 8.1 01/25/2025    MICROALBCREA 8.8 03/13/2024      Lab Results   Component Value Date    CREATSERUM 0.83 01/25/2025    CREATSERUM 0.83 09/12/2024    EGFRCR 82 01/25/2025    EGFRCR 83 09/12/2024     Lab Results   Component Value Date    AST 17 01/25/2025    AST 18 09/12/2024    ALT 22 01/25/2025    ALT 18 09/12/2024       Lab Results   Component Value Date    TSH 1.383 01/25/2025    TSH 1.218 09/12/2024    T4F 1.4 07/10/2024           DM Complications:  Microvascular:   Neuropathy: yes, LE   Retinopathy: no  Nephropathy: no    Macrovascular:  PVD: no  CAD: no  Stroke/CVA: no    Modifying factors:  Medication adherence: yes   Recent steroids, illness or infections(past 3m)  No    Allergies: Glucophage [metformin hydrochloride]    Past Medical History:    Anxiety state, unspecified    Arrhythmia    SVT    Calculus of kidney    Essential hypertension, benign    High cholesterol    History of 2019 novel coronavirus disease (COVID-19)    not hospitalized, flu-like symptoms; lingering symptom: fogginess    Hypothyroidism    Dx at age 42    Type II or unspecified type diabetes mellitus without mention of complication, not stated as uncontrolled    Visual impairment    glasses     Past Surgical History:   Procedure Laterality Date    Colonoscopy N/A 7/23/2021    Procedure: COLONOSCOPY with forcep polypectomy, clip placement x1, hot snare polypectomy, and tattoo injection;  Surgeon: Acosta Barrera DO;  Location:  ENDOSCOPY    Other surgical history      [place] teeth extraction     Social History     Socioeconomic History    Marital status:      Number of children: 0   Occupational History    Occupation: Inventory Control     Comment: Warehouse    Occupation: [employer] worker   Tobacco Use    Smoking status: Never    Smokeless tobacco: Never   Vaping Use    Vaping status: Never Used   Substance and Sexual Activity    Alcohol use: Not Currently     Comment: rare    Drug use: Yes     Types: Cannabis     Comment: Very very rare uses marijuana gummies    Sexual activity: Yes   Other Topics Concern    Caffeine Concern Yes     Comment: 1-2 cans of soda weekly.     Stress Concern Yes    Weight Concern Yes    Special Diet No    Exercise Yes     Comment: Walks 1-2x/week    Seat Belt Yes     Social Drivers of Health      [facility] [employer]     Family History   Problem Relation Age of Onset    Diabetes Mother         Type 2 DM    Heart Disorder Mother         CHF and atrial fib    Diabetes Father     Diabetes Maternal Grandmother     Diabetes Brother         One brother with Type 2 DM    Thyroid Disorder Neg     Thyroid disease Neg      Current Outpatient Medications   Medication Sig Dispense Refill    rosuvastatin 20 MG Oral Tab Take 1 tablet (20 mg total) by mouth nightly. 90 tablet 3    lisinopril 10 MG Oral Tab Take 1 tablet (10 mg total) by mouth daily. 90 tablet 3    sertraline 50 MG Oral Tab Take 1 tablet (50 mg total) by mouth daily. 30 tablet 0    insulin aspart (NOVOLOG) 100 Units/mL Injection Solution Uses up to 40 units daily via insulin pump 40 mL 1    levothyroxine 137 MCG Oral Tab TAKE 1 TABLET BY MOUTH DAILY BEFORE BREAKFAST. 90 tablet 1    Tirzepatide (MOUNJARO) 15 MG/0.5ML Subcutaneous Solution Pen-injector Inject 15 mg into the skin once a week. 6 mL 1    Empagliflozin (JARDIANCE) 25 MG Oral Tab TAKE 1 TABLET BY MOUTH EVERY DAY 90 tablet 2    fexofenadine 180 MG Oral Tab Take 1 tablet (180 mg total) by mouth daily. 30 tablet 0    Metoprolol Succinate ER 25 MG Oral Tablet 24 Hr Take 1 tablet (25 mg total) by  mouth daily.      orphenadrine  MG Oral Tablet 12 Hr Take 100 mg by mouth 2 (two) times daily as needed. 60 tablet 0    ALPRAZOLAM 0.5 MG Oral Tab TAKE 1 TABLET BY MOUTH AT NIGHT AS NEEDED 30 tablet 0    traMADol 50 MG Oral Tab Take 1 tablet (50 mg total) by mouth every 6 (six) hours as needed for Pain. 30 tablet 0    Continuous Glucose Sensor (DEXCOM G7 SENSOR) Does not apply Misc 1 each Every 10 days. Use as directed every 10 days 9 each 1    Insulin Disposable Pump (OMNIPOD 5 G7 PODS, GEN 5,) Does not apply Misc 1 each every 3 (three) days. 10 each 5    zolpidem 10 MG Oral Tab Take 1 tablet (10 mg total) by mouth nightly as needed for Sleep. 90 tablet 0    Continuous Glucose Sensor (DEXCOM G6 SENSOR) Does not apply Misc USE AS DIRECTED EVERY 10 DAYS 9 each 1    insulin degludec (TRESIBA FLEXTOUCH) 100 UNIT/ML Subcutaneous Solution Pen-injector Up to 12 units once daily when off pump only 3 mL 0    Insulin Disposable Pump (OMNIPOD 5 G6 PODS, GEN 5,) Does not apply Misc 1 each every 3 (three) days. 10 each 3    Continuous Blood Gluc Transmit (DEXCOM G6 TRANSMITTER) Does not apply Misc Use as directed with Dexcom G 6 sensor 1 each 3    Albuterol Sulfate HFA (PROAIR HFA) 108 (90 BASE) MCG/ACT Inhalation Aero Soln Inhale 2 puffs into the lungs every 4 (four) hours as needed for Wheezing. 1 Inhaler 6     DM associated review of  symptoms:   Endocrine: Polyuria, polyphagia, polydipsia: no  Neurological: Paresthesias: no  HEENT: Blurred vision: no  Skin: no wounds or skin rash   Hematological: Hypoglycemia: no      Review of Systems   LUNGS: denies shortness of breath   CARDIOVASCULAR: denies chest pain  GI: denies abdominal pain, nausea  or diarrhea   : denies dysuria or mycotic infection sxs       Physical exam:  /66   Pulse 74   Resp 16   Wt 203 lb 12.8 oz (92.4 kg)   LMP  (LMP Unknown)   SpO2 96%   BMI 33.91 kg/m²   Body mass index is 33.91 kg/m².      Physical Exam   Vitals  reviewed.  Constitutional: Normal appearance   Cardiovascular: Normal rate   Pulmonary/Chest: Effort normal  Neurological: Alert and oriented .   Psychiatric: Normal mood and affect.   Musculoskeletal:  Diabetes foot exam:   Good foot hygiene.   Bilateral barefoot skin diabetic exam: normal.  Visualized feet and the appearance : normal.  Bilateral monofilament/sensation of both feet is normal. Vibration to dorsum to the first toe perceived.   Bilateral 2+ pedal pulse pedal pulse exam        Assessment/Plan:    Hypertension   Well controlled   With weight loss and lower SBP, will decrease stop hydrochlorothiazide 12.5mg   Continue Lisinopril 10mg once daily    Reminded pt BP target < 140-90       Dyslipidemia   Last labs  1-2025   LDL target < 100 mg/dl   Change  Atorvastatin prescription to Rosuvastatin 20 mg once daily   Recheck labs in 3 m     Obesity   Weight improving   Continue lifestyle modifications and Mounjaro rx          Type 2 diabetes, uncontrolled, with neuropathy (HCC)  A1C: 6.2 %  (last A1C 6.3%)   Weight: 203   lb ( last weight 204 lb, 224, 237  lb  )   Diabetes control is stable;   needs ongoing management and evaluation  given the chronicity of Diabetes.  Reminded about treatment goals and benefits of improved glycemic control for her long term health   Prefers to stay on insulin despite low insulin use --> feels better with consistent insulin delivery v MDI   Will decrease pump settings       Omni pod 5  w humalog U 100 w Dexcom G6   12MN   0.5 /hour -> 0.2 u/hour       Bolus settings:   IC ratio :  5 --> 8   ~Reminded to enter carbs at meals : using set meal entries: 30- gm per meal     ISF: 30 --> 45   BG target 120 mg/dl (correct above 120 mg/dl)   3 hour active insulin    Reverse correction ON     Continue:  Mounjaro 15 mg subcutaneous once weekly    Jardiance 25mg once daily  Basal for pump back up:   Tresiba units 100 flex touch: 8 units once daily   Continue site rotation  site  selection/rotation for pump sites to avoid lipodystrophy     Reminded pt on A1C and blood sugar targets (Fasting < 130 and post prandial <180 ) and complications associated with hyperglycemia and uncontrolled DM (on AUTUMN)  Recommended SMBG 4- x daily if not using CGM   Reviewed s/s and treatment of hypoglycemia (on AVS)   Glucagon prescription - history   Continue lifestyle modifications since they have positive impact on diabetes/blood sugars/health (portion control, physical activity, weight loss)     The patient is asked to return in 5-6  m ,  but recommended to contact DM clinic sooner if questions or concerns.        Orders Placed This Encounter    Comp Metabolic Panel (14)     Standing Status:   Future     Standing Expiration Date:   2026    Lipid Panel     Standing Status:   Future     Standing Expiration Date:   2026    Interpretation code 72 hour glucose monitor    rosuvastatin 20 MG Oral Tab     Sig: Take 1 tablet (20 mg total) by mouth nightly.     Dispense:  90 tablet     Refill:  3     Cancel atorvastatin prescription    lisinopril 10 MG Oral Tab     Sig: Take 1 tablet (10 mg total) by mouth daily.     Dispense:  90 tablet     Refill:  3     Diabetes complications & risks surveillance:   A1C/Blood pressure: as reported   Nephropathy screenin  continue ace /arb rx.   LIPID screenin      . atorvastatin rx.   Last dilated eye exam: Last Dilated Eye Exam: 24   Exam shows retinopathy? Eye Exam shows Diabetic Retinopathy?: No  Last diabetic foot exam: Last Foot Exam: 25      The risks and benefits of my recommendations, as well as other treatment options were discussed with the patient today. questions were also answered to the best of my knowledge.

## 2025-01-29 ENCOUNTER — OFFICE VISIT (OUTPATIENT)
Dept: ENDOCRINOLOGY CLINIC | Facility: CLINIC | Age: 60
End: 2025-01-29
Payer: COMMERCIAL

## 2025-01-29 VITALS
WEIGHT: 203.81 LBS | DIASTOLIC BLOOD PRESSURE: 66 MMHG | HEART RATE: 74 BPM | SYSTOLIC BLOOD PRESSURE: 118 MMHG | OXYGEN SATURATION: 96 % | RESPIRATION RATE: 16 BRPM | BODY MASS INDEX: 34 KG/M2

## 2025-01-29 DIAGNOSIS — Z96.41 INSULIN PUMP IN PLACE: ICD-10-CM

## 2025-01-29 DIAGNOSIS — E11.65 TYPE 2 DIABETES MELLITUS WITH HYPERGLYCEMIA, WITH LONG-TERM CURRENT USE OF INSULIN (HCC): Primary | ICD-10-CM

## 2025-01-29 DIAGNOSIS — E78.5 DYSLIPIDEMIA: ICD-10-CM

## 2025-01-29 DIAGNOSIS — E66.811 CLASS 1 OBESITY DUE TO EXCESS CALORIES WITH SERIOUS COMORBIDITY AND BODY MASS INDEX (BMI) OF 33.0 TO 33.9 IN ADULT: ICD-10-CM

## 2025-01-29 DIAGNOSIS — I10 ESSENTIAL HYPERTENSION: ICD-10-CM

## 2025-01-29 DIAGNOSIS — Z79.4 TYPE 2 DIABETES MELLITUS WITH HYPERGLYCEMIA, WITH LONG-TERM CURRENT USE OF INSULIN (HCC): Primary | ICD-10-CM

## 2025-01-29 DIAGNOSIS — E66.09 CLASS 1 OBESITY DUE TO EXCESS CALORIES WITH SERIOUS COMORBIDITY AND BODY MASS INDEX (BMI) OF 33.0 TO 33.9 IN ADULT: ICD-10-CM

## 2025-01-29 PROCEDURE — 95251 CONT GLUC MNTR ANALYSIS I&R: CPT | Performed by: NURSE PRACTITIONER

## 2025-01-29 PROCEDURE — 99214 OFFICE O/P EST MOD 30 MIN: CPT | Performed by: NURSE PRACTITIONER

## 2025-01-29 RX ORDER — LISINOPRIL 10 MG/1
10 TABLET ORAL DAILY
Qty: 90 TABLET | Refills: 3 | Status: SHIPPED | OUTPATIENT
Start: 2025-01-29 | End: 2026-01-24

## 2025-01-29 RX ORDER — ROSUVASTATIN CALCIUM 20 MG/1
20 TABLET, COATED ORAL NIGHTLY
Qty: 90 TABLET | Refills: 3 | Status: SHIPPED | OUTPATIENT
Start: 2025-01-29

## 2025-01-29 NOTE — PROGRESS NOTES
-----------------------------  Dexcom Clarity  -----------------------------  Taryn Zapataajit    YOB: 1965    Generated at: Wed, Jan 29, 2025 7:02 AM CST    Reporting period: Tue Dec 31, 2024 - Wed Jan 29, 2025  -----------------------------  Glucose Details    Average glucose: 145 mg/dL    GMI: 6.8%    Standard deviation: 28 mg/dL    Coefficient of Variation: 19.0%  -----------------------------  Time in Range    Very High: 0%    High: 12%    In Range: 88%    Low: 0%    Very Low: 0%    Target Range   mg/dL    -----------------------------  Sensor usage    Days with data: 29/30    Time active: 97%    Avg. calibrations per day: 0.0

## 2025-01-29 NOTE — PATIENT INSTRUCTIONS
A1C 6.2%   Your trends are doing well       LDL is still a little high for cholesterol; change atorvastatin to Rosuvastatin     We can update levels after 3 months on new prescription   Continue  Jardiance 25 mg once daily   Mounjaro 15mg once weekly   Omni pod pump --> made changes to decrease insulin if in manual mode     Blood pressure: start Lisinopril 10mg once daily (stopped the hydrochlorothiazide in previous prescription)     American Diabetes Association: blood sugar targets:     Fasting blood sugar (before breakfast) Target:    (ideally less than 110)  2 hours after eating less than 180 (ideally less than  150 )     Call for blood sugars less than  75 or greater than  200 more than 2 times in a week     If you are wearing the sensor, please look at your trends/averages    Recommendations:   GMI (estimated A1C ) target under  7%     Time in range (healthy blood sugar targets) : goal is over 70%   less than 70 : goal is less than 4%   Over 180 : goal is less than 20 %   Over 250: goal is  less than 5%       Watch for low blood sugars: (less than 70 )    Treatment of Low Blood Glucose Action Plan  1. Check blood glucose to be sure that it is low. You cannot  always go by symptoms or how you feel. If in doubt, treat your low blood glucose anyway.  Rule of 15 :     2. Take 15 grams of carbohydrate (carb). Here are some choices:    4 oz. regular fruit juice  3-4 glucose tablets  6 oz. regular soda   7-8 jelly beans    3. Recheck blood glucose after 10-15 minutes. If blood glucose is still low (less than 70 mg/dl) repeat the treatment (step 2).    4. If your next meal is more than one hour away, eat a small snack.    5. If you’re not sure what caused your low blood glucose, call your healthcare provider.    6. Always check your blood glucose before you drive       To treat a low, I recommend you carry with you easy, pre-portioned treatment for low blood sugars that are 15G of carbs:   - Children sized  squeeze pouch applesauce (high fiber + carbs help prevent too high of a spike)  - Small children's sized juicebox- 15g carb --> 4oz juice box  - Glucose tablets from [employer], you can find them near diabetes supplies --> Note, you will need to eat 3-4 tablets to get to 15g of carbs  - Children sized fruit snack pack- look for one with 15 grams of total carbohydrate    AVOID complex carbs, or foods that contain fats along with carbs (like chocolate) can slow the absorption of glucose and should NOT be used to treat an emergency low

## 2025-02-02 DIAGNOSIS — F41.9 ANXIETY: ICD-10-CM

## 2025-02-02 RX ORDER — ALPRAZOLAM 0.5 MG
0.5 TABLET ORAL NIGHTLY PRN
Qty: 30 TABLET | Refills: 0 | Status: SHIPPED | OUTPATIENT
Start: 2025-02-02

## 2025-02-06 ENCOUNTER — TELEPHONE (OUTPATIENT)
Dept: INTERNAL MEDICINE CLINIC | Facility: CLINIC | Age: 60
End: 2025-02-06

## 2025-02-06 NOTE — PROGRESS NOTES
HPI:     Patient presents for recheck of their hypertension/hyperlipidemia. Pt has been taking medications as instructed, no medication side effects, home BP monitoring has not been done.  Currently asymptomatic, no chest pains, jaw pains, arm pains. No headaches, dizziness or edema.  No SOB on exertion or rest.  Pt has been following a low fat diet and has been exercising/walking.  Patient sees the diabetic clinic for her diabetes. Pt is up to date with her eye exam which was done 11/2024.     Pt is here for a recheck of anxiety. Has been tolerating the meds but feels she needs to go up in the dose. Pt's  is still not working and her unemployment just ran out. Denies any side effects from the meds. Mood has been ok. Patient's appetite is good.Pt denies headaches,tics. But has been having issues with sleeping. Pt can not stay asleep pt only gets a few hours. No depressive symptoms or suicidal ideations.      Pt had a history of hypothyroidism and here to recheck. Has been tolerating the medication well. TSH up to date. Denies any shakiness, palpitations, increased BM's or wgt change. Denies any fatigue, skin or hair issues.     The patient is here for a recheck on insomnia. The insomnia medication is not working like it did.Pt also has been under a lot of stress and feels that is playing a part in it. Pt has issues falling asleep and staying asleep.  Pt has not had any side effects from the medication. Pt uses the medication it as needed but seems to needs it nightly lately.       The patient presents for recheck of RAD sx's. Lately the patient's asthma has been  under excellent control. When symptoms occur they are described as wheezing, dyspnea, and chest tightness. Patient has been using her inhaler rarely. .  ACT (Control Test) score of  25 and AAP (Asthma action Plan)reviewed,discussed and updated 2/8/2025. I agree and approve of the AAP done today.        Screening:  Immunization History   Administered  Date(s) Administered    Covid-19 Vaccine Pfizer 30 mcg/0.3 ml 02/13/2021, 03/06/2021, 10/21/2021    Covid-19 Vaccine Pfizer Bivalent 30mcg/0.3mL 10/15/2022    DTAP 08/04/2013    FLUZONE 6 months and older PFS 0.5 ml (91137) 09/24/2018, 09/13/2019, 10/21/2021, 10/15/2022, 10/26/2023    Flucelvax 0.5 Ml Quad PFS Single Dose 10/09/2020    Flucelvax Influenza vaccine, trivalent (ccIIV3), 0.5mL IM 10/09/2020    Influenza 10/21/2008, 10/14/2015, 09/12/2016, 10/04/2017, 09/24/2018, 09/11/2020    Pneumococcal Conjugate PCV20 04/22/2023    Pneumovax 23 02/22/2017    TD 03/01/2004    TDAP 07/31/2013, 10/26/2023    Zoster Vaccine Recombinant Adjuvanted (Shingrix) 01/07/2021, 04/02/2022       Wt Readings from Last 6 Encounters:   02/08/25 208 lb (94.3 kg)   01/29/25 203 lb 12.8 oz (92.4 kg)   07/31/24 204 lb (92.5 kg)   07/10/24 207 lb 12.8 oz (94.3 kg)   06/22/24 207 lb 6.4 oz (94.1 kg)   03/13/24 205 lb (93 kg)     Body mass index is 34.61 kg/m².     Lab Results   Component Value Date     (H) 01/25/2025     (H) 09/12/2024     (H) 07/10/2024     Lab Results   Component Value Date    CHOLEST 170 01/25/2025    CHOLEST 169 07/10/2024    CHOLEST 118 10/24/2023     Lab Results   Component Value Date    HDL 43 01/25/2025    HDL 48 07/10/2024    HDL 35 (L) 10/24/2023     Lab Results   Component Value Date     (H) 01/25/2025     (H) 07/10/2024    LDL 60 10/24/2023     Lab Results   Component Value Date    AST 17 01/25/2025    AST 18 09/12/2024    AST 24 07/10/2024     Lab Results   Component Value Date    ALT 22 01/25/2025    ALT 18 09/12/2024    ALT 24 07/10/2024       Current Outpatient Medications   Medication Sig Dispense Refill    MOUNJARO 15 MG/0.5ML Subcutaneous Solution Auto-injector Inject 15 mg into the skin once a week.      sertraline 100 MG Oral Tab Take 0.5 tablets (50 mg total) by mouth daily. 90 tablet 0    albuterol (PROAIR HFA) 108 (90 Base) MCG/ACT Inhalation Aero Soln Inhale 2  puffs into the lungs every 4 (four) hours as needed for Wheezing. 1 each 1    ALPRAZOLAM 0.5 MG Oral Tab TAKE 1 TABLET BY MOUTH AT NIGHT AS NEEDED 30 tablet 0    levothyroxine 137 MCG Oral Tab Take 137 mcg by mouth before breakfast. 90 tablet 1    rosuvastatin 20 MG Oral Tab Take 1 tablet (20 mg total) by mouth nightly. 90 tablet 3    lisinopril 10 MG Oral Tab Take 1 tablet (10 mg total) by mouth daily. 90 tablet 3    orphenadrine  MG Oral Tablet 12 Hr Take 100 mg by mouth 2 (two) times daily as needed. 60 tablet 0    insulin aspart (NOVOLOG) 100 Units/mL Injection Solution Uses up to 40 units daily via insulin pump 40 mL 1    Empagliflozin (JARDIANCE) 25 MG Oral Tab TAKE 1 TABLET BY MOUTH EVERY DAY 90 tablet 2    zolpidem 10 MG Oral Tab Take 1 tablet (10 mg total) by mouth nightly as needed for Sleep. 90 tablet 0    Continuous Glucose Sensor (DEXCOM G6 SENSOR) Does not apply Misc USE AS DIRECTED EVERY 10 DAYS 9 each 1    insulin degludec (TRESIBA FLEXTOUCH) 100 UNIT/ML Subcutaneous Solution Pen-injector Up to 12 units once daily when off pump only 3 mL 0    Insulin Disposable Pump (OMNIPOD 5 G6 PODS, GEN 5,) Does not apply Misc 1 each every 3 (three) days. 10 each 3    fexofenadine 180 MG Oral Tab Take 1 tablet (180 mg total) by mouth daily. 30 tablet 0    Continuous Blood Gluc Transmit (DEXCOM G6 TRANSMITTER) Does not apply Misc Use as directed with Dexcom G 6 sensor 1 each 3    Metoprolol Succinate ER 25 MG Oral Tablet 24 Hr Take 1 tablet (25 mg total) by mouth daily.      Continuous Glucose Sensor (DEXCOM G7 SENSOR) Does not apply Misc 1 each Every 10 days. Use as directed every 10 days 9 each 1    Insulin Disposable Pump (OMNIPOD 5 G7 PODS, GEN 5,) Does not apply Misc 1 each every 3 (three) days. 10 each 5      Past Medical History:    Anxiety state, unspecified    Arrhythmia    SVT    Calculus of kidney    Essential hypertension, benign    High cholesterol    History of 2019 novel coronavirus disease  (COVID-19)    not hospitalized, flu-like symptoms; lingering symptom: fogginess    Hypothyroidism    Dx at age 42    Type II or unspecified type diabetes mellitus without mention of complication, not stated as uncontrolled    Visual impairment    glasses      Past Surgical History:   Procedure Laterality Date    Colonoscopy N/A 7/23/2021    Procedure: COLONOSCOPY with forcep polypectomy, clip placement x1, hot snare polypectomy, and tattoo injection;  Surgeon: Acosta Barrera DO;  Location:  ENDOSCOPY    Other surgical history      [place] teeth extraction      Family History   Problem Relation Age of Onset    Diabetes Mother         Type 2 DM    Heart Disorder Mother         CHF and atrial fib    Diabetes Father     Diabetes Maternal Grandmother     Diabetes Brother         One brother with Type 2 DM    Thyroid Disorder Neg     Thyroid disease Neg       Social History:   Social History     Socioeconomic History    Marital status:     Number of children: 0   Occupational History    Occupation: Inventory Control     Comment: [employer]    Occupation: [employer] worker   Tobacco Use    Smoking status: Never    Smokeless tobacco: Never   Vaping Use    Vaping status: Never Used   Substance and Sexual Activity    Alcohol use: Not Currently     Comment: rare    Drug use: Yes     Types: Cannabis     Comment: Very very rare uses marijuana gummies    Sexual activity: Yes   Other Topics Concern    Caffeine Concern Yes     Comment: 1-2 cans of soda weekly.     Stress Concern Yes    Weight Concern Yes    Special Diet No    Exercise Yes     Comment: Walks 1-2x/week    Seat Belt Yes     Social Drivers of Health      [facility]    [facility]        REVIEW OF SYSTEMS:   GENERAL: feels well otherwise  SKIN: denies any unusual skin lesions  EYES:denies blurred vision or double vision  HEENT: denies nasal congestion, sinus pain or ST  LUNGS: denies shortness of breath with  exertion  CARDIOVASCULAR: denies chest pain on exertion  GI: denies abdominal pain,denies heartburn  : denies dysuria, vaginal discharge or itching  MUSCULOSKELETAL: + sacral back pain on and off  NEURO: denies headaches  PSYCHE:+ anxiety and insomnia  HEMATOLOGIC: denies hx of anemia  ENDOCRINE: denies thyroid history  ALL/ASTHMA: +RAD    EXAM:   /68 (BP Location: Left arm, Patient Position: Sitting, Cuff Size: adult)   Pulse 80   Temp 97.2 °F (36.2 °C) (Temporal)   Resp 16   Ht 5' 5\" (1.651 m)   Wt 208 lb (94.3 kg)   LMP  (LMP Unknown)   SpO2 96%   BMI 34.61 kg/m²   Body mass index is 34.61 kg/m².   GENERAL: well developed, well nourished,in no apparent distress  SKIN: no rashes,no suspicious lesions  HEENT: atraumatic, normocephalic,ears and throat are clear  EYES:conjunctiva are clear  NECK: supple,no adenopathy,no bruits, no thyromegaly  CHEST: no chest tenderness  LUNGS: clear to auscultation,no SOB or wheezing  CARDIO: RRR without murmur  MUSCULOSKELETAL: FROM of the back,tail bone area mild tenderness  EXTREMITIES: no cyanosis, clubbing or edema  NEURO: Oriented times three,cranial nerves are intact,motor and sensory are grossly intact  PSYCH:  Speech, mood and affect are appropriate    ASSESSMENT AND PLAN:     Encounter Diagnoses   Name Primary?    Encounter for screening colonoscopy Yes    Hypertension, essential, benign     Hyperlipidemia LDL goal <100     Anxiety     Insomnia, unspecified type     Hypothyroidism (acquired)     Mild intermittent reactive airway disease without complication (HCC)        No orders of the defined types were placed in this encounter.      Meds & Refills for this Visit:  Requested Prescriptions     Signed Prescriptions Disp Refills    sertraline 100 MG Oral Tab 90 tablet 0     Sig: Take 0.5 tablets (50 mg total) by mouth daily.    albuterol (PROAIR HFA) 108 (90 Base) MCG/ACT Inhalation Aero Soln 1 each 1     Sig: Inhale 2 puffs into the lungs every 4 (four)  hours as needed for Wheezing.       Imaging & Consults:  SURGERY - INTERNAL      1. Encounter for screening colonoscopy    - Surgery Referral - In Network    2. Hypertension, essential, benign  Conservative measures dicussed. Continue medication.  Diet and exercise explained and encouraged.  Home blood pressure monitoring. Pt should measure BP’s two to three times per week. Goal blood pressure at home - < 135/85.       3. Hyperlipidemia LDL goal <100  Pt to continue their medication, increase exercise,  choose better fats,  increase fiber and avoid processed foods.    4. Anxiety  Increase Zoloft dose to 100 mg daily and see if it helps the stress levels and sleeping issues.  - sertraline 100 MG Oral Tab; Take 0.5 tablets (50 mg total) by mouth daily.  Dispense: 90 tablet; Refill: 0    5. Insomnia, unspecified type  - will increase Zoloft first and see if that helps,. If not will try a different sleep aid with Pt like Lunesta.    6. Hypothyroidism (acquired)  Patient at goal, no symptoms of over or under-replaced.  Continue current dose of medication.      7. Mild intermittent reactive airway disease without complication (HCC)  - stable, continue inhaler as needed.  - albuterol (PROAIR HFA) 108 (90 Base) MCG/ACT Inhalation Aero Soln; Inhale 2 puffs into the lungs every 4 (four) hours as needed for Wheezing.  Dispense: 1 each; Refill: 1    The patient indicates understanding of these issues and agrees to the plan.  The patient is asked to return in June for her Px and pap smear..    I spent 30 minutes preparing to see the patient,reviewing patient's chart,results,history,medical decision making,placing orders,counseling/coordinating care and documenting in the chart.

## 2025-02-08 ENCOUNTER — OFFICE VISIT (OUTPATIENT)
Dept: INTERNAL MEDICINE CLINIC | Facility: CLINIC | Age: 60
End: 2025-02-08
Payer: COMMERCIAL

## 2025-02-08 VITALS
DIASTOLIC BLOOD PRESSURE: 68 MMHG | TEMPERATURE: 97 F | SYSTOLIC BLOOD PRESSURE: 120 MMHG | RESPIRATION RATE: 16 BRPM | BODY MASS INDEX: 34.66 KG/M2 | WEIGHT: 208 LBS | HEART RATE: 80 BPM | OXYGEN SATURATION: 96 % | HEIGHT: 65 IN

## 2025-02-08 DIAGNOSIS — Z12.11 ENCOUNTER FOR SCREENING COLONOSCOPY: ICD-10-CM

## 2025-02-08 DIAGNOSIS — G47.00 INSOMNIA, UNSPECIFIED TYPE: ICD-10-CM

## 2025-02-08 DIAGNOSIS — E78.5 HYPERLIPIDEMIA LDL GOAL <100: ICD-10-CM

## 2025-02-08 DIAGNOSIS — E03.9 HYPOTHYROIDISM (ACQUIRED): ICD-10-CM

## 2025-02-08 DIAGNOSIS — J45.20 MILD INTERMITTENT REACTIVE AIRWAY DISEASE WITHOUT COMPLICATION (HCC): ICD-10-CM

## 2025-02-08 DIAGNOSIS — I10 HYPERTENSION, ESSENTIAL, BENIGN: Primary | ICD-10-CM

## 2025-02-08 DIAGNOSIS — F41.9 ANXIETY: ICD-10-CM

## 2025-02-08 PROCEDURE — 99214 OFFICE O/P EST MOD 30 MIN: CPT | Performed by: FAMILY MEDICINE

## 2025-02-08 RX ORDER — TIRZEPATIDE 15 MG/.5ML
15 INJECTION, SOLUTION SUBCUTANEOUS WEEKLY
COMMUNITY
Start: 2025-01-23

## 2025-02-08 RX ORDER — ALBUTEROL SULFATE 90 UG/1
2 INHALANT RESPIRATORY (INHALATION) EVERY 4 HOURS PRN
Qty: 1 EACH | Refills: 1 | Status: SHIPPED | OUTPATIENT
Start: 2025-02-08

## 2025-02-08 RX ORDER — SERTRALINE HYDROCHLORIDE 100 MG/1
50 TABLET, FILM COATED ORAL DAILY
Qty: 90 TABLET | Refills: 0 | Status: SHIPPED | OUTPATIENT
Start: 2025-02-08

## 2025-02-13 DIAGNOSIS — G47.00 INSOMNIA, UNSPECIFIED TYPE: ICD-10-CM

## 2025-02-13 DIAGNOSIS — F41.9 ANXIETY: ICD-10-CM

## 2025-02-14 NOTE — TELEPHONE ENCOUNTER
Zolpidem 10 mg  Filled 9-12-24  Qty 90  0 refills  Future Appointments   Date Time Provider Department Center   2/15/2025  9:00 AM HERNANDEZ MARIA DEL ROSARIO RM1 HERNANDEZ Barrios   2/27/2025  3:15 PM COLON SCREEN GENSURG [facility] [id number]   7/30/2025  7:30 AM Kathleen Buitrago APRN [id number]

## 2025-02-14 NOTE — TELEPHONE ENCOUNTER
Alprazolam 0.5 mg  Filled 2-2-25  Qty 30  0 refills  Future Appointments   Date Time Provider Department Center   2/15/2025  9:00 AM FEDERICOK MARIA DEL ROSARIO RM1 BBK ANJELICA Barrios   2/27/2025  3:15 PM COLON SCREEN GENSURG [facility] [id number]   7/30/2025  7:30 AM Kathleen Buitrago APRN [id number] [place]   LOV 2-8-25 SM

## 2025-02-15 RX ORDER — ALPRAZOLAM 0.5 MG
0.5 TABLET ORAL NIGHTLY PRN
Qty: 30 TABLET | Refills: 0 | Status: SHIPPED | OUTPATIENT
Start: 2025-02-15

## 2025-02-15 RX ORDER — ZOLPIDEM TARTRATE 10 MG/1
10 TABLET ORAL NIGHTLY PRN
Qty: 90 TABLET | Refills: 0 | Status: SHIPPED | OUTPATIENT
Start: 2025-02-15

## 2025-02-15 RX ORDER — ORPHENADRINE CITRATE 100 MG/1
100 TABLET ORAL 2 TIMES DAILY PRN
Qty: 60 TABLET | Refills: 0 | Status: SHIPPED | OUTPATIENT
Start: 2025-02-15

## 2025-02-24 ENCOUNTER — PATIENT MESSAGE (OUTPATIENT)
Dept: ENDOCRINOLOGY CLINIC | Facility: CLINIC | Age: 60
End: 2025-02-24

## 2025-02-24 ENCOUNTER — TELEPHONE (OUTPATIENT)
Facility: CLINIC | Age: 60
End: 2025-02-24

## 2025-02-24 RX ORDER — TIRZEPATIDE 15 MG/.5ML
15 INJECTION, SOLUTION SUBCUTANEOUS WEEKLY
Qty: 6 ML | Refills: 1 | Status: SHIPPED | OUTPATIENT
Start: 2025-02-24

## 2025-02-24 NOTE — TELEPHONE ENCOUNTER
Submitted Prior Authorization on Cover My Meds through [employer], contacted [employer] due to response below:    \"A claim submitted today for a quantity of 6 of MOUNJARO 15MG/0.5ML PEN INJCTR would deny for exceeding the maximum cost allowed by the plan.\"    Called [employer] @ [phone], they gave override. Current Prior Authorization is good through 07/18/2025.  Will notify patient.

## 2025-02-24 NOTE — TELEPHONE ENCOUNTER
Requested Prescriptions     Pending Prescriptions Disp Refills    MOUNJARO 15 MG/0.5ML Subcutaneous Solution Auto-injector [Pharmacy Med Name: Mounjaro Subcutaneous Solution Auto-injector 15 MG/0.5ML] 6 mL 0     Sig: Inject 15 mg into the skin once a week.     Future Appointments   Date Time Provider Department Center   2/27/2025  3:15 PM COLON SCREEN GENSURG [facility] [id number]   3/8/2025  9:00 AM BBK MARIA DEL ROSARIO RM1 BBK M[facility] [place]   7/30/2025  7:30 AM Kathleen Buitrago APRN [id number] [place]     Last A1c value was 6.2% done 1/25/2025.  Refill 01/23/25 Jayden   LOV 01/29/25 C,Boss

## 2025-02-24 NOTE — TELEPHONE ENCOUNTER
Completed Prior Authorization on Cover My Meds.    KEY: [id number]  PA Case ID #: [id number]  Your PA has been faxed to the plan as a paper copy. Please contact the plan directly if you haven't received a determination in a typical timeframe.    You will be notified of the determination via fax.

## 2025-02-27 ENCOUNTER — TELEPHONE (OUTPATIENT)
Facility: LOCATION | Age: 60
End: 2025-02-27

## 2025-02-27 ENCOUNTER — NURSE ONLY (OUTPATIENT)
Age: 60
End: 2025-02-27
Payer: COMMERCIAL

## 2025-02-27 DIAGNOSIS — Z12.11 ENCOUNTER FOR SCREENING COLONOSCOPY: Primary | ICD-10-CM

## 2025-02-27 RX ORDER — POLYETHYLENE GLYCOL 3350, SODIUM CHLORIDE, SODIUM BICARBONATE, POTASSIUM CHLORIDE 420; 11.2; 5.72; 1.48 G/4L; G/4L; G/4L; G/4L
POWDER, FOR SOLUTION ORAL
Qty: 1 EACH | Refills: 0 | Status: SHIPPED | OUTPATIENT
Start: 2025-02-27

## 2025-02-27 NOTE — PROGRESS NOTES
Staff:  [facility] Colon Screening Orders    Please schedule: Colonoscopy [id number]    Diagnosis: Colon Screening Z12.11 / History of Colon polyps  Z86.010 / Family history of colon cancer Z80.0     Please send Trilyte bowel prep . Please send to [facility] on file.     Patient has a history of SVT. Will require cardiac clearance form Dr. Riley, her cardiologist. She also takes Insulin. Will need provide specific instructions.     >>>Please inform patient if new medications are started after scheduling procedure they need to call clinic to notify us.      Called patient for a scheduled telephone colon screening/FIT positive result.     Medications, pharmacy, and allergies verified with patient over the phone.     Please advise on colonoscopy orders and appropriate bowel prep.      Age 45-76 y/o:    MD preference:  Next available surgeon   Last CBC drawn: CBC:  Lab Results   Component Value Date    WBC 8.9 07/10/2024    WBC 9.2 02/25/2023    WBC 10.0 03/23/2022    .0 07/10/2024    .0 02/25/2023    .0 03/23/2022     Date of positive FIT test: N/A  H\": 5' 5\" (1.651 m) W: 208 lb (94.3 kg)  BMI: 34.61 kg/m2        Special comments/notes:  Recall details:      Last Procedure, Date, MD:    Dr. Acosta Barrera   1/28/2022  Pathology:  A.  Ascending colon biopsy:  -Ischemic changes of colonic mucosa in biopsy.  -Negative for dysplasia.  -See comment.     B.  Sigmoid biopsy, prior polypectomy site:  -Multiple fragments of tubular adenoma.  -No evidence of high-grade dysplasia or malignancy.  -See comment.    7/23/2021  Path  A. Sigmoid colon polyp (forceps):  -Hyperplastic polyp.    B. Sigmoid colon polyp (hot snare):  -Fragments of tubulovillous adenoma.      Last diagnosis:  screening colonoscopy, patient is high risk based on family history   Recalled for (mth/yrs):  6 months    Sedation used previously:  MAC   Last Prep Used:     Quality of Prep:  Ilfeld Scale 3, ascending. Ilfeld Scale 3, transverse. Ilfeld Scale 3,  descending       Telephone colon screening Questionnaires:  Yes No   Are you currently experiencing any new GI symptoms? []  [x]    If yes, symptom details:       Rectal Bleeding with or without bowel movements: []  [x]    Black stool: []  [x]    Dysphagia &Food feeling/getting stuck: []  [x]    Intractable Vomiting: []  [x]    Unexplained weight loss: []  [x]    First colonoscopy? []  [x]    Family history of colon cancer?  Mother had colon polyps  Paternal grandmother had colon cancer [x]  []    Any issues with anesthesia? []  [x]    If yes, explain details:        Personal history of Resp. Issues/Oxygen Use/EMILY/COPD? []  [x]    If yes, with CPAP/BiPAP:  []  [x]    History of devices Pacemaker/Defibrillator/Stents? []  [x]    History of Cardiac/CVA issues/(MI/Stroke):  []  [x]       Medication usage:  Yes  No   Anticoagulants:  Anticoagulant (Except Aspirin) ? Route to RN staff to obtain ordering provider orders []  [x]    Diabetic Meds:   PO DM Meds ? Hold day prior and day of procedure  Insulin ? Route to RN staff to obtain ordering provider orders   Patient uses insulin. Chart routed to RN staff.  [x]  []    Weight loss meds (phentermine/Vyvanse/Saxsenda):  Mounjaro [x]  []    Iron/Herbal/Multivitamin Supplement (RX/OTC):  Iron, potassium, vitamin d3, one a day multivitamin [x]  []    Usage of marijuana, CBD &/or vape products:  Marijuana gummy- occasional  []  [x]          Allergies[1]      Current Outpatient Medications:     Tirzepatide (MOUNJARO) 15 MG/0.5ML Subcutaneous Solution Auto-injector, Inject 15 mg into the skin once a week., Disp: 6 mL, Rfl: 1    ALPRAZolam 0.5 MG Oral Tab, Take 1 tablet (0.5 mg total) by mouth nightly as needed., Disp: 30 tablet, Rfl: 0    ZOLPIDEM 10 MG Oral Tab, Take 1 tablet (10 mg total) by mouth nightly as needed for Sleep., Disp: 90 tablet, Rfl: 0    ORPHENADRINE  MG Oral Tablet 12 Hr, TAKE 1 TABLET BY MOUTH TWICE A DAY AS NEEDED, Disp: 60 tablet, Rfl: 0    sertraline  100 MG Oral Tab, Take 1 tablet (100 mg total) by mouth daily., Disp: 90 tablet, Rfl: 0    sertraline 100 MG Oral Tab, Take 0.5 tablets (50 mg total) by mouth daily., Disp: 90 tablet, Rfl: 0    albuterol (PROAIR HFA) 108 (90 Base) MCG/ACT Inhalation Aero Soln, Inhale 2 puffs into the lungs every 4 (four) hours as needed for Wheezing., Disp: 1 each, Rfl: 1    levothyroxine 137 MCG Oral Tab, Take 137 mcg by mouth before breakfast., Disp: 90 tablet, Rfl: 1    rosuvastatin 20 MG Oral Tab, Take 1 tablet (20 mg total) by mouth nightly., Disp: 90 tablet, Rfl: 3    lisinopril 10 MG Oral Tab, Take 1 tablet (10 mg total) by mouth daily., Disp: 90 tablet, Rfl: 3    insulin aspart (NOVOLOG) 100 Units/mL Injection Solution, Uses up to 40 units daily via insulin pump, Disp: 40 mL, Rfl: 1    Continuous Glucose Sensor (DEXCOM G7 SENSOR) Does not apply Misc, 1 each Every 10 days. Use as directed every 10 days, Disp: 9 each, Rfl: 1    Insulin Disposable Pump (OMNIPOD 5 G7 PODS, GEN 5,) Does not apply Misc, 1 each every 3 (three) days., Disp: 10 each, Rfl: 5    Empagliflozin (JARDIANCE) 25 MG Oral Tab, TAKE 1 TABLET BY MOUTH EVERY DAY, Disp: 90 tablet, Rfl: 2    Continuous Glucose Sensor (DEXCOM G6 SENSOR) Does not apply Misc, USE AS DIRECTED EVERY 10 DAYS, Disp: 9 each, Rfl: 1    insulin degludec (TRESIBA FLEXTOUCH) 100 UNIT/ML Subcutaneous Solution Pen-injector, Up to 12 units once daily when off pump only, Disp: 3 mL, Rfl: 0    Insulin Disposable Pump (OMNIPOD 5 G6 PODS, GEN 5,) Does not apply Misc, 1 each every 3 (three) days., Disp: 10 each, Rfl: 3    fexofenadine 180 MG Oral Tab, Take 1 tablet (180 mg total) by mouth daily., Disp: 30 tablet, Rfl: 0    Continuous Blood Gluc Transmit (DEXCOM G6 TRANSMITTER) Does not apply Misc, Use as directed with Dexcom G 6 sensor, Disp: 1 each, Rfl: 3    Metoprolol Succinate ER 25 MG Oral Tablet 24 Hr, Take 1 tablet (25 mg total) by mouth daily., Disp: , Rfl:     Past Surgical History:    Procedure Laterality Date    Colonoscopy N/A 7/23/2021    Procedure: COLONOSCOPY with forcep polypectomy, clip placement x1, hot snare polypectomy, and tattoo injection;  Surgeon: Acosta Barrera DO;  Location:  ENDOSCOPY    Other surgical history      [place] teeth extraction             [1]   Allergies  Allergen Reactions    Glucophage [Metformin Hydrochloride] RASH

## 2025-03-03 ENCOUNTER — TELEPHONE (OUTPATIENT)
Facility: LOCATION | Age: 60
End: 2025-03-03

## 2025-03-03 NOTE — TELEPHONE ENCOUNTER
Prior authorization approved for cpt code 39222. Ref #: [id number]. Spoke with Ghada @ 4:04pm on 3/3    Start date: 3/3/25  End date: 9/3/25

## 2025-03-08 ENCOUNTER — HOSPITAL ENCOUNTER (OUTPATIENT)
Dept: MAMMOGRAPHY | Age: 60
Discharge: HOME | End: 2025-03-08
Attending: FAMILY MEDICINE
Payer: COMMERCIAL

## 2025-03-08 DIAGNOSIS — Z12.31 ENCOUNTER FOR SCREENING MAMMOGRAM FOR MALIGNANT NEOPLASM OF BREAST: ICD-10-CM

## 2025-03-08 PROCEDURE — 77067 SCR MAMMO BI INCL CAD: CPT | Performed by: FAMILY MEDICINE

## 2025-03-08 PROCEDURE — 77063 BREAST TOMOSYNTHESIS BI: CPT | Performed by: FAMILY MEDICINE

## 2025-03-15 RX ORDER — ORPHENADRINE CITRATE 100 MG/1
100 TABLET ORAL 2 TIMES DAILY PRN
Qty: 60 TABLET | Refills: 0 | Status: SHIPPED | OUTPATIENT
Start: 2025-03-15

## 2025-03-19 ENCOUNTER — ANESTHESIA EVENT (OUTPATIENT)
Dept: ENDOSCOPY | Facility: HOSPITAL | Age: 60
End: 2025-03-19
Payer: COMMERCIAL

## 2025-03-19 NOTE — ANESTHESIA PREPROCEDURE EVALUATION
PRE-OP EVALUATION    Patient Name: Taryn Watt    Admit Diagnosis: Encounter for screening colonoscopy [Z12.11]    Pre-op Diagnosis: Encounter for screening colonoscopy [Z12.11]    COLONOSCOPY    Anesthesia Procedure: COLONOSCOPY    Surgeons and Role:     * Aden Mohan MD - Primary    Pre-op vitals reviewed.        Body mass index is 33.12 kg/m².    Current medications reviewed.  Hospital Medications:   glucose (Dex4) 15 GM/59ML oral liquid 15 g  15 g Oral Q15 Min PRN    Or    glucose (Glutose) 40% oral gel 15 g  15 g Oral Q15 Min PRN    Or    glucose-vitamin C (Dex-4) chewable tab 4 tablet  4 tablet Oral Q15 Min PRN    Or    dextrose 50% injection 50 mL  50 mL Intravenous Q15 Min PRN    Or    glucose (Dex4) 15 GM/59ML oral liquid 30 g  30 g Oral Q15 Min PRN    Or    glucose (Glutose) 40% oral gel 30 g  30 g Oral Q15 Min PRN    Or    glucose-vitamin C (Dex-4) chewable tab 8 tablet  8 tablet Oral Q15 Min PRN    lactated ringers infusion   Intravenous Continuous       Outpatient Medications:   Prescriptions Prior to Admission[1]    Allergies: Glucophage [metformin hydrochloride]      Anesthesia Evaluation    Patient summary reviewed.    Anesthetic Complications  (-) history of anesthetic complications         GI/Hepatic/Renal                                 Cardiovascular                  (+) hypertension   (+) hyperlipidemia               (+) dysrhythmias and SVT                  Endo/Other      (+) diabetes     (+) hypothyroidism                       Pulmonary                           Neuro/Psych                                      Past Surgical History:   Procedure Laterality Date    Colonoscopy N/A 7/23/2021    Procedure: COLONOSCOPY with forcep polypectomy, clip placement x1, hot snare polypectomy, and tattoo injection;  Surgeon: Acosta Barrera DO;  Location:  ENDOSCOPY    Other surgical history      [place] teeth extraction     Social History     Socioeconomic History    Marital status:      Number of children: 0   Occupational History    Occupation: Inventory Control     Comment: Warehouse    Occupation: [employer] worker   Tobacco Use    Smoking status: Never    Smokeless tobacco: Never   Vaping Use    Vaping status: Never Used   Substance and Sexual Activity    Alcohol use: Not Currently     Comment: rare    Drug use: Yes     Types: Cannabis     Comment: Very very rare uses marijuana gummies    Sexual activity: Yes   Other Topics Concern    Caffeine Concern Yes     Comment: 1-2 cans of soda weekly.     Stress Concern Yes    Weight Concern Yes    Special Diet No    Exercise Yes     Comment: Walks 1-2x/week    Seat Belt Yes     History   Drug Use    Types: Cannabis     Comment: Very very rare uses marijuana gummies     Available pre-op labs reviewed.     Lab Results   Component Value Date     01/25/2025    K 3.8 01/25/2025     01/25/2025    CO2 29.0 01/25/2025    BUN 16 01/25/2025    CREATSERUM 0.83 01/25/2025     (H) 01/25/2025    CA 9.5 01/25/2025            Airway      Mallampati: II  Mouth opening: 3 FB  TM distance: 4 - 6 cm  Neck ROM: full Cardiovascular      Rhythm: regular  Rate: normal     Dental  Comment: No loose teeth reported by patient           Pulmonary      Breath sounds clear to auscultation bilaterally.               Other findings              ASA: 2   Plan: MAC  NPO status verified and patient meets guidelines.          Plan/risks discussed with: patient                Present on Admission:  **None**             [1]   Medications Prior to Admission   Medication Sig Dispense Refill Last Dose/Taking    PEG 3350-KCl-Na Bicarb-NaCl (TRILYTE) 420 g Oral Recon Soln Starting at 4:00 pm the night before procedure, drink 8 ounces of the prep every 15-20 minutes until finished 1 each 0 Taking    Tirzepatide (MOUNJARO) 15 MG/0.5ML Subcutaneous Solution Auto-injector Inject 15 mg into the skin once a week. (Patient taking differently: Inject 15 mg into the skin once a week.  SUNDAY) 6 mL 1 Taking Differently    ALPRAZolam 0.5 MG Oral Tab Take 1 tablet (0.5 mg total) by mouth nightly as needed. 30 tablet 0 Taking As Needed    ZOLPIDEM 10 MG Oral Tab Take 1 tablet (10 mg total) by mouth nightly as needed for Sleep. 90 tablet 0 Taking As Needed    sertraline 100 MG Oral Tab Take 1 tablet (100 mg total) by mouth daily. 90 tablet 0 Taking    albuterol (PROAIR HFA) 108 (90 Base) MCG/ACT Inhalation Aero Soln Inhale 2 puffs into the lungs every 4 (four) hours as needed for Wheezing. 1 each 1 Taking As Needed    levothyroxine 137 MCG Oral Tab Take 137 mcg by mouth before breakfast. 90 tablet 1 Taking    rosuvastatin 20 MG Oral Tab Take 1 tablet (20 mg total) by mouth nightly. 90 tablet 3 Taking    lisinopril 10 MG Oral Tab Take 1 tablet (10 mg total) by mouth daily. 90 tablet 3 Taking    insulin aspart (NOVOLOG) 100 Units/mL Injection Solution Uses up to 40 units daily via insulin pump (Patient taking differently: Uses up to 40 units daily via insulin pump.  BASAL RATE-4.8 UNITS A DAY  INSULIN TO CARB RATIO 10 GRAMS   CF 45  TARGET GLUCOSE LEVEL ) 40 mL 1 Taking Differently    Continuous Glucose Sensor (DEXCOM G7 SENSOR) Does not apply Misc 1 each Every 10 days. Use as directed every 10 days 9 each 1 Taking    Insulin Disposable Pump (OMNIPOD 5 G7 PODS, GEN 5,) Does not apply Misc 1 each every 3 (three) days. 10 each 5 Taking    Empagliflozin (JARDIANCE) 25 MG Oral Tab TAKE 1 TABLET BY MOUTH EVERY DAY 90 tablet 2 Taking    insulin degludec (TRESIBA FLEXTOUCH) 100 UNIT/ML Subcutaneous Solution Pen-injector Up to 12 units once daily when off pump only 3 mL 0 Taking    fexofenadine 180 MG Oral Tab Take 1 tablet (180 mg total) by mouth daily. 30 tablet 0 Taking    Metoprolol Succinate ER 25 MG Oral Tablet 24 Hr Take 1 tablet (25 mg total) by mouth daily.   Taking    ORPHENADRINE  MG Oral Tablet 12 Hr TAKE 1 TABLET BY MOUTH TWICE A DAY AS NEEDED 60 tablet 0

## 2025-03-20 ENCOUNTER — ANESTHESIA (OUTPATIENT)
Dept: ENDOSCOPY | Facility: HOSPITAL | Age: 60
End: 2025-03-20
Payer: COMMERCIAL

## 2025-03-20 ENCOUNTER — HOSPITAL ENCOUNTER (OUTPATIENT)
Facility: HOSPITAL | Age: 60
Setting detail: HOSPITAL OUTPATIENT SURGERY
Discharge: HOME | End: 2025-03-20
Attending: SURGERY | Admitting: SURGERY
Payer: COMMERCIAL

## 2025-03-20 VITALS
OXYGEN SATURATION: 98 % | SYSTOLIC BLOOD PRESSURE: 97 MMHG | HEIGHT: 65 IN | HEART RATE: 83 BPM | DIASTOLIC BLOOD PRESSURE: 62 MMHG | BODY MASS INDEX: 33.15 KG/M2 | TEMPERATURE: 97 F | RESPIRATION RATE: 16 BRPM | WEIGHT: 199 LBS

## 2025-03-20 DIAGNOSIS — Z12.11 ENCOUNTER FOR SCREENING COLONOSCOPY: ICD-10-CM

## 2025-03-20 PROBLEM — K64.4 ANAL SKIN TAG: Status: ACTIVE | Noted: 2025-03-20

## 2025-03-20 PROBLEM — K63.5 POLYP OF HEPATIC FLEXURE OF COLON: Status: ACTIVE | Noted: 2025-03-20

## 2025-03-20 LAB — GLUCOSE BLD-MCNC: 119 MG/DL (ref 70–99)

## 2025-03-20 PROCEDURE — 82962 GLUCOSE BLOOD TEST: CPT

## 2025-03-20 PROCEDURE — 0DBL8ZX EXCISION OF TRANSVERSE COLON, VIA NATURAL OR ARTIFICIAL OPENING ENDOSCOPIC, DIAGNOSTIC: ICD-10-PCS | Performed by: SURGERY

## 2025-03-20 PROCEDURE — 88305 TISSUE EXAM BY PATHOLOGIST: CPT | Performed by: SURGERY

## 2025-03-20 RX ORDER — NICOTINE POLACRILEX 4 MG
15 LOZENGE BUCCAL
Status: DISCONTINUED | OUTPATIENT
Start: 2025-03-20 | End: 2025-03-20

## 2025-03-20 RX ORDER — GLYCOPYRROLATE 0.2 MG/ML
INJECTION, SOLUTION INTRAMUSCULAR; INTRAVENOUS AS NEEDED
Status: DISCONTINUED | OUTPATIENT
Start: 2025-03-20 | End: 2025-03-20 | Stop reason: SURG

## 2025-03-20 RX ORDER — DEXTROSE MONOHYDRATE 25 G/50ML
50 INJECTION, SOLUTION INTRAVENOUS
Status: DISCONTINUED | OUTPATIENT
Start: 2025-03-20 | End: 2025-03-20

## 2025-03-20 RX ORDER — SODIUM CHLORIDE, SODIUM LACTATE, POTASSIUM CHLORIDE, CALCIUM CHLORIDE 600; 310; 30; 20 MG/100ML; MG/100ML; MG/100ML; MG/100ML
INJECTION, SOLUTION INTRAVENOUS CONTINUOUS
Status: DISCONTINUED | OUTPATIENT
Start: 2025-03-20 | End: 2025-03-20

## 2025-03-20 RX ORDER — NALOXONE HYDROCHLORIDE 0.4 MG/ML
80 INJECTION, SOLUTION INTRAMUSCULAR; INTRAVENOUS; SUBCUTANEOUS AS NEEDED
Status: DISCONTINUED | OUTPATIENT
Start: 2025-03-20 | End: 2025-03-20

## 2025-03-20 RX ORDER — ONDANSETRON 2 MG/ML
INJECTION INTRAMUSCULAR; INTRAVENOUS AS NEEDED
Status: DISCONTINUED | OUTPATIENT
Start: 2025-03-20 | End: 2025-03-20 | Stop reason: SURG

## 2025-03-20 RX ORDER — LIDOCAINE HYDROCHLORIDE 10 MG/ML
INJECTION, SOLUTION EPIDURAL; INFILTRATION; INTRACAUDAL; PERINEURAL AS NEEDED
Status: DISCONTINUED | OUTPATIENT
Start: 2025-03-20 | End: 2025-03-20 | Stop reason: SURG

## 2025-03-20 RX ORDER — NICOTINE POLACRILEX 4 MG
30 LOZENGE BUCCAL
Status: DISCONTINUED | OUTPATIENT
Start: 2025-03-20 | End: 2025-03-20

## 2025-03-20 RX ADMIN — ONDANSETRON 4 MG: 2 INJECTION INTRAMUSCULAR; INTRAVENOUS at 12:16:00

## 2025-03-20 RX ADMIN — SODIUM CHLORIDE, SODIUM LACTATE, POTASSIUM CHLORIDE, CALCIUM CHLORIDE: 600; 310; 30; 20 INJECTION, SOLUTION INTRAVENOUS at 12:46:00

## 2025-03-20 RX ADMIN — ONDANSETRON 4 MG: 2 INJECTION INTRAMUSCULAR; INTRAVENOUS at 12:26:00

## 2025-03-20 RX ADMIN — GLYCOPYRROLATE 0.4 MG: 0.2 INJECTION, SOLUTION INTRAMUSCULAR; INTRAVENOUS at 12:26:00

## 2025-03-20 RX ADMIN — LIDOCAINE HYDROCHLORIDE 50 MG: 10 INJECTION, SOLUTION EPIDURAL; INFILTRATION; INTRACAUDAL; PERINEURAL at 11:38:00

## 2025-03-20 NOTE — OPERATIVE REPORT
[facility]                                                                                              Colonoscopy Operative Report    Taryn Watt Patient Status:  Hospital Outpatient Surgery    1965 MRN [id number]   Location [facility] ENDOSCOPY PAIN CENTER Attending Aden Mohan MD   Hosp Day #   0 PCP Iain Baker MD     Pre-Operative Diagnosis: Encounter for screening colonoscopy [Z12.11]    Post-Operative Diagnosis: Polyp ,Hemorrhoid    Procedure Performed: Procedure(s):  COLONOSCOPY WITH COLD SNARE POLYPECTOMY AND COLD FORCEP POLYPECTOMY    Informed Consent: Informed consent for both the procedure and sedation were obtained from the patient. The potentially life-threatening complications of sedation, bleeding,  perforation, transfusion or repeat endoscopy  were reviewed along with the possible need for hospitalization, surgical management, transfusion or repeat endoscopy should one of these complications arise. The patient understands and is agreeable to proceed.  Sedation Type: MAC  Date of previous colonoscopy:      Procedure Description: The patient was placed in the left lateral decubitus position.  After careful digital rectal examination there was an external hemorrhoid/skin tag noted, the  colonoscope was inserted into the rectum and advanced to the level of the cecum under direct visualization. The hepatic flexure was tortuous to navigate. The cecum was identified by landmarks, including the appendiceal orifice and ileoceccal valve. Careful examination of the entire colon was performed during withdrawal of the endoscope. The scope was withdrawn to the rectum and retroflexion was performed, there were noninflammed non bleeding hemorrhoidal plexus seen and a skin tag at the anal verge.  The patient tolerated the procedure well with no immediate complications. The patient was transferred to the recovery area in  stable condition.  Quality of Preparation: Adequate  [place] Bowel Prep Score:        Right Colon: 3        Transverse Colon: 3        Left Colon: 3  Total Score:  9    Findings: Hepatic flexure tortuosity and small flat polyp  Specimens:  ID Type Source Tests Collected by Time Destination   1 : hepatic flexure polyp Tissue Hepatic flexure SURGICAL PATHOLOGY TISSUE Aden Mohan MD 3/20/2025 12:21 PM        Recommendations: F/U path  Follow-up:  Repeat colonoscopy 5-7 years  Aden Mohan MD  3/20/2025  12:44 PM

## 2025-03-20 NOTE — DISCHARGE INSTRUCTIONS
Home Care Instructions for Colonoscopy with Sedation    Diet:  - Resume your regular diet   - Start with light meals to minimize bloating.  - Do not drink alcohol today.    Medication:  - If you have questions about resuming your normal medications, please contact your Primary Care Physician.    Activities:  - Take it easy today. Do not return to work today.  - Do not drive today.  - Do not operate any machinery today (including kitchen equipment).    Colonoscopy:  - You may notice some rectal \"spotting\" (a little blood on the toilet tissue) for a day or two after the exam. This is normal.  - If you experience any rectal bleeding (not spotting), persistent tenderness or sharp severe abdominal pains, oral temperature over 100 degrees Fahrenheit, light-headedness or dizziness, or any other problems, contact your doctor.    **If unable to reach your doctor, please go to the [facility] Emergency Room**    - Your referring physician will receive a full report of your examination.  - If you do not hear from your doctor's office within two weeks of your biopsy, please call them for your results.

## 2025-03-20 NOTE — H&P
History & Physical Examination    Patient Name: Taryn Watt  MRN: [id number]  CSN: [id number]  YOB: 1965    Diagnosis: screening colonoscopy    Present Illness: Last colonoscopy w/ Dr. Barrera in 2022, 2 polyps removed, sigmoid TV adenoma and sigmoid hyperplastic polyp.     Prescriptions Prior to Admission[1]  Current Facility-Administered Medications   Medication Dose Route Frequency    glucose (Dex4) 15 GM/59ML oral liquid 15 g  15 g Oral Q15 Min PRN    Or    glucose (Glutose) 40% oral gel 15 g  15 g Oral Q15 Min PRN    Or    glucose-vitamin C (Dex-4) chewable tab 4 tablet  4 tablet Oral Q15 Min PRN    Or    dextrose 50% injection 50 mL  50 mL Intravenous Q15 Min PRN    Or    glucose (Dex4) 15 GM/59ML oral liquid 30 g  30 g Oral Q15 Min PRN    Or    glucose (Glutose) 40% oral gel 30 g  30 g Oral Q15 Min PRN    Or    glucose-vitamin C (Dex-4) chewable tab 8 tablet  8 tablet Oral Q15 Min PRN    lactated ringers infusion   Intravenous Continuous       Allergies: Allergies[2]    Past Medical History:    Anxiety state, unspecified    Arrhythmia    SVT    Back problem    SORE TAILBONE    Calculus of kidney    Disorder of thyroid    Diverticulosis of large intestine    Essential hypertension, benign    High cholesterol    History of 2019 novel coronavirus disease (COVID-19)    not hospitalized, flu-like symptoms; lingering symptom: fogginess    Hypothyroidism    Dx at age 42    Type II or unspecified type diabetes mellitus without mention of complication, not stated as uncontrolled    Visual impairment    glasses     Past Surgical History:   Procedure Laterality Date    Colonoscopy N/A 7/23/2021    Procedure: COLONOSCOPY with forcep polypectomy, clip placement x1, hot snare polypectomy, and tattoo injection;  Surgeon: Acosta Barrera DO;  Location:  ENDOSCOPY    Other surgical history      [place] teeth extraction     Family History   Problem Relation Age of Onset    Diabetes Mother         Type 2 DM     Heart Disorder Mother         CHF and atrial fib    Diabetes Father     Diabetes Maternal Grandmother     Diabetes Brother         One brother with Type 2 DM    Thyroid Disorder Neg     Thyroid disease Neg      Social History     Tobacco Use    Smoking status: Never    Smokeless tobacco: Never   Substance Use Topics    Alcohol use: Not Currently     Comment: rare       SYSTEM Check if Review is Normal Check if Physical Exam is Normal If not normal, please explain:   HEENT [ ] [ ]    NECK & BACK [ ] [ ]    HEART [ x] [ x]    LUNGS [ x] [ x]    ABDOMEN [ x] [ x]    UROGENITAL [ ] [ ]    EXTREMITIES [ ] [ ]    OTHER        [ x ] I have discussed the risks and benefits and alternatives with the patient/family.  They understand and agree to proceed with plan of care.  [ x ] I have reviewed the History and Physical done within the last 30 days.  Any changes noted above.    Aden Mohan MD  3/20/2025  8:05 AM           [1]   Medications Prior to Admission   Medication Sig Dispense Refill Last Dose/Taking    PEG 3350-KCl-Na Bicarb-NaCl (TRILYTE) 420 g Oral Recon Soln Starting at 4:00 pm the night before procedure, drink 8 ounces of the prep every 15-20 minutes until finished 1 each 0 Taking    Tirzepatide (MOUNJARO) 15 MG/0.5ML Subcutaneous Solution Auto-injector Inject 15 mg into the skin once a week. (Patient taking differently: Inject 15 mg into the skin once a week. SUNDAY) 6 mL 1 Taking Differently    ALPRAZolam 0.5 MG Oral Tab Take 1 tablet (0.5 mg total) by mouth nightly as needed. 30 tablet 0 Taking As Needed    ZOLPIDEM 10 MG Oral Tab Take 1 tablet (10 mg total) by mouth nightly as needed for Sleep. 90 tablet 0 Taking As Needed    sertraline 100 MG Oral Tab Take 1 tablet (100 mg total) by mouth daily. 90 tablet 0 Taking    albuterol (PROAIR HFA) 108 (90 Base) MCG/ACT Inhalation Aero Soln Inhale 2 puffs into the lungs every 4 (four) hours as needed for Wheezing. 1 each 1 Taking As Needed    levothyroxine 137 MCG Oral  Tab Take 137 mcg by mouth before breakfast. 90 tablet 1 Taking    rosuvastatin 20 MG Oral Tab Take 1 tablet (20 mg total) by mouth nightly. 90 tablet 3 Taking    lisinopril 10 MG Oral Tab Take 1 tablet (10 mg total) by mouth daily. 90 tablet 3 Taking    insulin aspart (NOVOLOG) 100 Units/mL Injection Solution Uses up to 40 units daily via insulin pump (Patient taking differently: Uses up to 40 units daily via insulin pump.  BASAL RATE-4.8 UNITS A DAY  INSULIN TO CARB RATIO 10 GRAMS   CF 45  TARGET GLUCOSE LEVEL ) 40 mL 1 Taking Differently    Continuous Glucose Sensor (DEXCOM G7 SENSOR) Does not apply Misc 1 each Every 10 days. Use as directed every 10 days 9 each 1 Taking    Insulin Disposable Pump (OMNIPOD 5 G7 PODS, GEN 5,) Does not apply Misc 1 each every 3 (three) days. 10 each 5 Taking    Empagliflozin (JARDIANCE) 25 MG Oral Tab TAKE 1 TABLET BY MOUTH EVERY DAY 90 tablet 2 Taking    insulin degludec (TRESIBA FLEXTOUCH) 100 UNIT/ML Subcutaneous Solution Pen-injector Up to 12 units once daily when off pump only 3 mL 0 Taking    fexofenadine 180 MG Oral Tab Take 1 tablet (180 mg total) by mouth daily. 30 tablet 0 Taking    Metoprolol Succinate ER 25 MG Oral Tablet 24 Hr Take 1 tablet (25 mg total) by mouth daily.   Taking    ORPHENADRINE  MG Oral Tablet 12 Hr TAKE 1 TABLET BY MOUTH TWICE A DAY AS NEEDED 60 tablet 0    [2]   Allergies  Allergen Reactions    Glucophage [Metformin Hydrochloride] RASH

## 2025-03-20 NOTE — ANESTHESIA POSTPROCEDURE EVALUATION
[facility]    Taryn Watt Patient Status:  Hospital Outpatient Surgery   Age/Gender 59 year old female MRN [id number]   Location [facility] ENDOSCOPY PAIN CENTER Attending Aden Mohan MD   Hosp Day # 0 PCP Iain Baker MD       Anesthesia Post-op Note    COLONOSCOPY WITH COLD SNARE POLYPECTOMY AND COLD FORCEP POLYPECTOMY    Procedure Summary       Date: 03/20/25 Room / Location:  ENDOSCOPY 04 /  ENDOSCOPY    Anesthesia Start: 1134 Anesthesia Stop: 1246    Procedure: COLONOSCOPY WITH COLD SNARE POLYPECTOMY AND COLD FORCEP POLYPECTOMY Diagnosis:       Encounter for screening colonoscopy      (Polyp ,Hemorrhoid)    Surgeons: Aden Mohan MD Anesthesiologist: Chai Eli MD    Anesthesia Type: MAC ASA Status: 2            Anesthesia Type: MAC    Vitals Value Taken Time   BP 82/42 03/20/25 1247   Temp  03/20/25 1247   Pulse 88 03/20/25 1246   Resp 16 03/20/25 1247   SpO2 96 % 03/20/25 1246   Vitals shown include unfiled device data.        Patient Location: Endoscopy    Anesthesia Type: MAC    Airway Patency: patent    Postop Pain Control: adequate    Mental Status: mildly sedated but able to meaningfully participate in the post-anesthesia evaluation    Nausea/Vomiting: none    Cardiopulmonary/Hydration status: stable euvolemic    Complications: no apparent anesthesia related complications    Postop vital signs: stable    Dental Exam: Unchanged from Preop    Patient to be discharged home when criteria met.

## 2025-03-21 NOTE — PROGRESS NOTES
Pt had no concerning polyps on yesterday's Cscope. 2nd degree relative w/ CRC, no syndromic FH.  She was on a very tight F/U schedule w/ Bruce, who was doing q6mos at one point.   I'm planning to have Cscope recall at 5yrs (truly guidelines say she should probably be 10yrs). If subsequent Cscope is similar will extend future scopes to 10yr interval.    - [facility]

## 2025-03-26 ENCOUNTER — TELEPHONE (OUTPATIENT)
Facility: CLINIC | Age: 60
End: 2025-03-26

## 2025-03-28 DIAGNOSIS — G89.29 CHRONIC MIDLINE LOW BACK PAIN WITHOUT SCIATICA: ICD-10-CM

## 2025-03-28 DIAGNOSIS — F41.9 ANXIETY: ICD-10-CM

## 2025-03-28 DIAGNOSIS — M54.50 CHRONIC MIDLINE LOW BACK PAIN WITHOUT SCIATICA: ICD-10-CM

## 2025-03-28 RX ORDER — ALPRAZOLAM 0.5 MG
0.5 TABLET ORAL NIGHTLY PRN
Qty: 30 TABLET | Refills: 0 | Status: SHIPPED | OUTPATIENT
Start: 2025-03-28

## 2025-03-29 RX ORDER — TRAMADOL HYDROCHLORIDE 50 MG/1
50 TABLET ORAL EVERY 6 HOURS PRN
Qty: 30 TABLET | Refills: 0 | Status: SHIPPED | OUTPATIENT
Start: 2025-03-29

## 2025-03-31 ENCOUNTER — TELEPHONE (OUTPATIENT)
Dept: INTERNAL MEDICINE CLINIC | Facility: CLINIC | Age: 60
End: 2025-03-31

## 2025-03-31 NOTE — TELEPHONE ENCOUNTER
Marito Lombardi   Ph:[phone]    Tramadol, Alprazolam, and Zolpidem. Requesting call back that provider is aware of drug interaction and is ok for patient to take medications.  
Spoke with Maria C at pharmacy. Made aware of Dr. Baker's note below.    Iain Baker MD to [facility] Clinical Staff       3/31/25 11:40 AM  All of these meds can cause sedation    Should not use at same time  
TO: Please see below and advise regarding interaction between Tramadol, Alprazolam, and Zolpidem. Any recs?     Attempted to call pharmacy to get more information regarding interaction. Received message that automated services unable to be reached and call is disconnected after message.   
no

## 2025-04-02 RX ORDER — ACYCLOVIR 400 MG/1
TABLET ORAL
Qty: 9 EACH | Refills: 0 | Status: SHIPPED | OUTPATIENT
Start: 2025-04-02

## 2025-04-02 NOTE — TELEPHONE ENCOUNTER
Requested Prescriptions     Pending Prescriptions Disp Refills    Continuous Glucose Sensor (DEXCOM G7 SENSOR) Does not apply Misc [Pharmacy Med Name: DEXCOM G7 SENSOR 10 DAY DEV] 9 each 0     Sig: Use as directed every 10 days.     Future Appointments   Date Time Provider Department Center   7/30/2025  7:30 AM Kathleen Buitrago APRN [id number]     Last A1c value was 6.2% done 1/25/2025.  Refill 11/01/24 [facility]   LOV 01/29/25 [facility]

## 2025-04-14 RX ORDER — ORPHENADRINE CITRATE 100 MG/1
100 TABLET ORAL 2 TIMES DAILY PRN
Qty: 60 TABLET | Refills: 0 | Status: SHIPPED | OUTPATIENT
Start: 2025-04-14

## 2025-04-14 NOTE — TELEPHONE ENCOUNTER
Orphenadrine  mg  Filled 3-15-25  Qty 60  0 refills  Future Appointments   Date Time Provider Department Center   7/28/2025  7:30 AM Kathleen Buitrago APRN [id number]   LOV 2-8-25 [facility]

## 2025-04-15 ENCOUNTER — HOSPITAL ENCOUNTER (OUTPATIENT)
Age: 60
Discharge: HOME | End: 2025-04-15
Payer: COMMERCIAL

## 2025-04-15 VITALS
BODY MASS INDEX: 28.14 KG/M2 | RESPIRATION RATE: 19 BRPM | DIASTOLIC BLOOD PRESSURE: 82 MMHG | WEIGHT: 201 LBS | OXYGEN SATURATION: 98 % | TEMPERATURE: 98 F | SYSTOLIC BLOOD PRESSURE: 143 MMHG | HEIGHT: 71 IN | HEART RATE: 73 BPM

## 2025-04-15 DIAGNOSIS — J40 BRONCHITIS: Primary | ICD-10-CM

## 2025-04-15 LAB
POCT INFLUENZA A: NEGATIVE
POCT INFLUENZA B: NEGATIVE
S PYO AG THROAT QL IA.RAPID: NEGATIVE
SARS-COV-2 RNA RESP QL NAA+PROBE: NOT DETECTED

## 2025-04-15 PROCEDURE — 99213 OFFICE O/P EST LOW 20 MIN: CPT

## 2025-04-15 PROCEDURE — 99214 OFFICE O/P EST MOD 30 MIN: CPT

## 2025-04-15 PROCEDURE — 87502 INFLUENZA DNA AMP PROBE: CPT | Performed by: PHYSICIAN ASSISTANT

## 2025-04-15 PROCEDURE — 87651 STREP A DNA AMP PROBE: CPT | Performed by: PHYSICIAN ASSISTANT

## 2025-04-15 RX ORDER — CODEINE PHOSPHATE AND GUAIFENESIN 10; 100 MG/5ML; MG/5ML
10 SOLUTION ORAL 3 TIMES DAILY PRN
Qty: 180 ML | Refills: 0 | Status: SHIPPED | OUTPATIENT
Start: 2025-04-15

## 2025-04-15 RX ORDER — PREDNISONE 20 MG/1
40 TABLET ORAL DAILY
Qty: 10 TABLET | Refills: 0 | Status: SHIPPED | OUTPATIENT
Start: 2025-04-15 | End: 2025-04-20

## 2025-04-15 RX ORDER — BENZONATATE 100 MG/1
100 CAPSULE ORAL 3 TIMES DAILY PRN
Qty: 30 CAPSULE | Refills: 0 | Status: SHIPPED | OUTPATIENT
Start: 2025-04-15 | End: 2025-05-15

## 2025-04-15 RX ORDER — ALBUTEROL SULFATE 90 UG/1
2 INHALANT RESPIRATORY (INHALATION) EVERY 4 HOURS PRN
Qty: 1 EACH | Refills: 0 | Status: SHIPPED | OUTPATIENT
Start: 2025-04-15 | End: 2025-05-15

## 2025-04-15 NOTE — ED PROVIDER NOTES
Patient Seen in: [facility]      History     Chief Complaint   Patient presents with    Cough/URI     Stated Complaint: Cold - Headache, sore throat runny nose    Subjective:   HPI  Tarynsa DEION Watt is a 59 year old female presents with acute onset of URI symptoms x 1 days. Patient reports  sinus congestion, non productive cough, rhinorrhea.  Patient denies dysphagia, throat pain, ear pain,  fevers, chills, shortness of breath, respiratory distress, stridor, neck pain/ stiffness, headache, eye pain/ redness, facial/ lip/ eyelid swelling. No medications taken prior to arrival. No alleviating/ aggravating factors. Patient is  concerned about COVID 19 infection at this encounter.  Patient is  immunized for COVID 19.          History of Present Illness               Objective:     Past Medical History:    Anxiety state, unspecified    Arrhythmia    SVT    Back problem    SORE TAILBONE    Calculus of kidney    Disorder of thyroid    Diverticulosis of large intestine    Essential hypertension, benign    High cholesterol    History of 2019 novel coronavirus disease (COVID-19)    not hospitalized, flu-like symptoms; lingering symptom: fogginess    Hypothyroidism    Dx at age 42    Type II or unspecified type diabetes mellitus without mention of complication, not stated as uncontrolled    Visual impairment    glasses              Past Surgical History:   Procedure Laterality Date    Colonoscopy N/A 7/23/2021    Procedure: COLONOSCOPY with forcep polypectomy, clip placement x1, hot snare polypectomy, and tattoo injection;  Surgeon: Acosta Barrera DO;  Location:  ENDOSCOPY    Colonoscopy N/A 3/20/2025    Procedure: COLONOSCOPY WITH COLD SNARE POLYPECTOMY AND COLD FORCEP POLYPECTOMY;  Surgeon: Aden Mohan MD;  Location:  ENDOSCOPY    Other surgical history      [place] teeth extraction                Social History     Socioeconomic History    Marital status:     Number of children: 0    Occupational History    Occupation: Inventory Control     Comment: Warehouse    Occupation: [employer] worker   Tobacco Use    Smoking status: Never    Smokeless tobacco: Never   Vaping Use    Vaping status: Never Used   Substance and Sexual Activity    Alcohol use: Not Currently     Comment: rare    Drug use: Yes     Types: Cannabis     Comment: Very very rare uses marijuana gummies    Sexual activity: Yes   Other Topics Concern    Caffeine Concern Yes     Comment: 1-2 cans of soda weekly.     Stress Concern Yes    Weight Concern Yes    Special Diet No    Exercise Yes     Comment: Walks 1-2x/week    Seat Belt Yes     Social Drivers of Health      Received from [facility]              Review of Systems   All other systems reviewed and are negative.      Positive for stated complaint: Cold - Headache, sore throat runny nose  Other systems are as noted in HPI.  Constitutional and vital signs reviewed.      All other systems reviewed and negative except as noted above.                  Physical Exam     ED Triage Vitals [04/15/25 1836]   /82   Pulse 73   Resp 19   Temp 98.4 °F (36.9 °C)   Temp src Oral   SpO2 98 %   O2 Device None (Room air)       Current Vitals:   Vital Signs  BP: 143/82  Pulse: 73  Resp: 19  Temp: 98.4 °F (36.9 °C)  Temp src: Oral    Oxygen Therapy  SpO2: 98 %  O2 Device: None (Room air)        Physical Exam  Vitals and nursing note reviewed.   Constitutional:       General: She is not in acute distress.     Appearance: Normal appearance. She is normal weight. She is not ill-appearing, toxic-appearing or diaphoretic.   HENT:      Head: Normocephalic and atraumatic.      Right Ear: Tympanic membrane, ear canal and external ear normal.      Left Ear: Tympanic membrane, ear canal and external ear normal.      Nose: Congestion present. No rhinorrhea.      Mouth/Throat:      Mouth: Mucous membranes are moist.      Pharynx: Oropharynx is clear. No oropharyngeal exudate or posterior  oropharyngeal erythema.   Eyes:      Extraocular Movements: Extraocular movements intact.      Conjunctiva/sclera: Conjunctivae normal.      Pupils: Pupils are equal, round, and reactive to light.   Pulmonary:      Effort: Pulmonary effort is normal. No respiratory distress.      Breath sounds: No stridor. No wheezing, rhonchi or rales.   Chest:      Chest wall: No tenderness.   Musculoskeletal:         General: No swelling, tenderness, deformity or signs of injury. Normal range of motion.      Cervical back: Normal range of motion and neck supple.   Skin:     General: Skin is warm and dry.      Capillary Refill: Capillary refill takes less than 2 seconds.      Coloration: Skin is not jaundiced or pale.      Findings: No bruising, erythema, lesion or rash.   Neurological:      General: No focal deficit present.      Mental Status: She is alert and oriented to person, place, and time. Mental status is at baseline.   Psychiatric:         Mood and Affect: Mood normal.         Behavior: Behavior normal.           Physical Exam                ED Course     Labs Reviewed   POCT FLU TEST - Normal    Narrative:     This assay is a rapid molecular in vitro test utilizing nucleic acid amplification of influenza A and B viral RNA.   RAPID STREP A - Normal   RAPID SARS-COV-2 BY PCR - Normal       ED Course as of 04/15/25 1928  ------------------------------------------------------------  Time: 04/15 1919  Value: Rapid Strep A - ID NOW  Comment: Negative    ------------------------------------------------------------  Time: 04/15 1924  Value: POCT Flu Test  Comment: Negative       Results                                 MDM           Medical Decision Making  59 year old well appearing female presents with acute onset of URI symptoms x 1 days.  Considerations to include but not limited to bronchitis vs pneumonia vs COVID 19 vs influenza A vs influenza B.  Patient is overall well-appearing, normotensive, nontachycardic, not  dyspneic with oxygen saturation at 98% on room air    Plan  - COVID 19/ flu A and B/ strep  swab  - SpO2 98% on room air  - reassess  - DC to home   - rx: albuterol inhaler 1-2 puffs by mouth every 4-6 hours/ prn.   Prednisone 40 mg p.o. daily x 5 days.  Guaifenasin with codeine po q 6 hours/ prn.    Tessalon 100mg PO TID.    - refer to PCP for further evaluation    - return to ED      Amount and/or Complexity of Data Reviewed  Labs: ordered. Decision-making details documented in ED Course.     Details: COVID 19/  strep  swab- negative         Disposition and Plan     Clinical Impression:  1. Bronchitis         Disposition:  Discharge  4/15/2025  7:28 pm    Follow-up:  Iain Baker MD  [address] [facility] [address]  [facility] [address]  [phone]          [facility]  [address]  On license of [facility] [address]  [phone]              Medications Prescribed:  Current Discharge Medication List        START taking these medications    Details   predniSONE 20 MG Oral Tab Take 2 tablets (40 mg total) by mouth daily for 5 days.  Qty: 10 tablet, Refills: 0      guaiFENesin-codeine 100-10 MG/5ML Oral Solution Take 10 mL by mouth 3 (three) times daily as needed.  Qty: 180 mL, Refills: 0    Associated Diagnoses: Bronchitis      !! albuterol 108 (90 Base) MCG/ACT Inhalation Aero Soln Inhale 2 puffs into the lungs every 4 (four) hours as needed for Wheezing.  Qty: 1 each, Refills: 0      benzonatate 100 MG Oral Cap Take 1 capsule (100 mg total) by mouth 3 (three) times daily as needed for cough.  Qty: 30 capsule, Refills: 0       !! - Potential duplicate medications found. Please discuss with provider.          Supplementary Documentation:

## 2025-05-04 DIAGNOSIS — F41.9 ANXIETY: ICD-10-CM

## 2025-05-05 RX ORDER — ALPRAZOLAM 0.5 MG
0.5 TABLET ORAL NIGHTLY PRN
Qty: 30 TABLET | Refills: 0 | Status: SHIPPED | OUTPATIENT
Start: 2025-05-05

## 2025-05-05 NOTE — TELEPHONE ENCOUNTER
Alprazolam 0.5 mg  Filled 3-28-25  Qty 30  0 refills  Future Appointments   Date Time Provider Department Center   7/28/2025  7:30 AM Kathleen Buitrago APRN [facility]   LOV 2-8-25 SM   no

## 2025-05-19 RX ORDER — ORPHENADRINE CITRATE 100 MG/1
100 TABLET ORAL 2 TIMES DAILY PRN
Qty: 60 TABLET | Refills: 0 | Status: SHIPPED | OUTPATIENT
Start: 2025-05-19

## 2025-05-19 NOTE — TELEPHONE ENCOUNTER
Requested Prescriptions     Pending Prescriptions Disp Refills    ORPHENADRINE  MG Oral Tablet 12 Hr [Pharmacy Med Name: ORPHENADRINE  MG TABLET] 60 tablet 0     Sig: TAKE 1 TABLET BY MOUTH TWICE A DAY AS NEEDED     LOV 2/8/25  RTC June   Filled 4/14/25 60 tabs 0 refills   Future Appointments   Date Time Provider Department Center   7/28/2025  7:30 AM Kathleen Buitrago APRN [facility]

## 2025-05-30 DIAGNOSIS — G89.29 CHRONIC MIDLINE LOW BACK PAIN WITHOUT SCIATICA: ICD-10-CM

## 2025-05-30 DIAGNOSIS — M54.50 CHRONIC MIDLINE LOW BACK PAIN WITHOUT SCIATICA: ICD-10-CM

## 2025-05-30 RX ORDER — INSULIN PMP CART,AUT,G6/7,CNTR
EACH SUBCUTANEOUS
Qty: 10 EACH | Refills: 4 | Status: SHIPPED | OUTPATIENT
Start: 2025-05-30

## 2025-05-30 NOTE — TELEPHONE ENCOUNTER
Requested Prescriptions     Pending Prescriptions Disp Refills    Insulin Disposable Pump (OMNIPOD 5 G7 PODS, GEN 5,) Does not apply Misc [Pharmacy Med Name: Omnipod 5 UzoP1M0 Pods (Gen 5)] 10 each 4     Sig: Change 1 pod every 3 days.     HGBA1C:    Lab Results   Component Value Date    A1C 6.2 (H) 01/25/2025    A1C 6.3 (A) 07/31/2024    A1C 6.3 (A) 03/13/2024     (H) 01/25/2025     Your Appointments      Monday July 28, 2025 7:30 AM  Diabetes Pump follow up with Kathleen Buitrago, [facility] ([facility] [place]) [address], [address] [address]  [phone]               Last Office Visit: 1--SUSANNAH    Last Refill: 10--10 each with 5 refills -CB

## 2025-05-31 RX ORDER — TRAMADOL HYDROCHLORIDE 50 MG/1
50 TABLET ORAL EVERY 6 HOURS PRN
Qty: 30 TABLET | Refills: 0 | Status: SHIPPED | OUTPATIENT
Start: 2025-05-31

## 2025-06-01 DIAGNOSIS — F41.9 ANXIETY: ICD-10-CM

## 2025-06-02 RX ORDER — ALPRAZOLAM 0.5 MG
0.5 TABLET ORAL NIGHTLY PRN
Qty: 30 TABLET | Refills: 0 | Status: SHIPPED | OUTPATIENT
Start: 2025-06-02

## 2025-06-09 DIAGNOSIS — G47.00 INSOMNIA, UNSPECIFIED TYPE: ICD-10-CM

## 2025-06-09 RX ORDER — ZOLPIDEM TARTRATE 10 MG/1
10 TABLET ORAL NIGHTLY PRN
Qty: 90 TABLET | Refills: 0 | Status: SHIPPED | OUTPATIENT
Start: 2025-06-09

## 2025-06-17 RX ORDER — ORPHENADRINE CITRATE 100 MG/1
100 TABLET ORAL 2 TIMES DAILY PRN
Qty: 60 TABLET | Refills: 0 | Status: SHIPPED | OUTPATIENT
Start: 2025-06-17

## 2025-07-01 RX ORDER — INSULIN ASPART 100 [IU]/ML
INJECTION, SOLUTION INTRAVENOUS; SUBCUTANEOUS
Qty: 40 ML | Refills: 0 | Status: SHIPPED | OUTPATIENT
Start: 2025-07-01

## 2025-07-01 NOTE — TELEPHONE ENCOUNTER
Requested Prescriptions     Pending Prescriptions Disp Refills    NOVOLOG 100 UNIT/ML Injection Solution [Pharmacy Med Name: Novolog 100unit/mL Solution for Injection]  0     Sig: Inject up to 40 units under the skin daily via insuli pump.     HGBA1C:    Lab Results   Component Value Date    A1C 6.2 (H) 01/25/2025    A1C 6.3 (A) 07/31/2024    A1C 6.3 (A) 03/13/2024     (H) 01/25/2025     Your Appointments      Monday July 28, 2025 7:30 AM  Diabetes Pump follow up with FELY Camargo  [facility] ([facility] [place]) [address], [address] [address]  [phone]               Last Office Visit:  1--CB    Last Refill:  11-4-2024-40 ml with 1 refills-CB

## 2025-07-05 DIAGNOSIS — M54.50 CHRONIC MIDLINE LOW BACK PAIN WITHOUT SCIATICA: ICD-10-CM

## 2025-07-05 DIAGNOSIS — F41.9 ANXIETY: ICD-10-CM

## 2025-07-05 DIAGNOSIS — G89.29 CHRONIC MIDLINE LOW BACK PAIN WITHOUT SCIATICA: ICD-10-CM

## 2025-07-06 RX ORDER — ALPRAZOLAM 0.5 MG
0.5 TABLET ORAL NIGHTLY PRN
Qty: 30 TABLET | Refills: 0 | Status: SHIPPED | OUTPATIENT
Start: 2025-07-06

## 2025-07-06 RX ORDER — TRAMADOL HYDROCHLORIDE 50 MG/1
50 TABLET ORAL EVERY 6 HOURS PRN
Qty: 30 TABLET | Refills: 0 | Status: SHIPPED | OUTPATIENT
Start: 2025-07-06

## 2025-07-20 RX ORDER — ORPHENADRINE CITRATE 100 MG/1
100 TABLET ORAL 2 TIMES DAILY PRN
Qty: 60 TABLET | Refills: 0 | Status: SHIPPED | OUTPATIENT
Start: 2025-07-20

## 2025-07-24 ENCOUNTER — LAB ENCOUNTER (OUTPATIENT)
Dept: LAB | Age: 60
End: 2025-07-24
Attending: FAMILY MEDICINE
Payer: COMMERCIAL

## 2025-07-24 DIAGNOSIS — Z79.4 TYPE 2 DIABETES MELLITUS WITH HYPERGLYCEMIA, WITH LONG-TERM CURRENT USE OF INSULIN (HCC): ICD-10-CM

## 2025-07-24 DIAGNOSIS — E11.65 TYPE 2 DIABETES MELLITUS WITH HYPERGLYCEMIA, WITH LONG-TERM CURRENT USE OF INSULIN (HCC): ICD-10-CM

## 2025-07-24 LAB
ALBUMIN SERPL-MCNC: 4.8 G/DL (ref 3.2–4.8)
ALBUMIN/GLOB SERPL: 1.6 {RATIO} (ref 1–2)
ALP LIVER SERPL-CCNC: 118 U/L (ref 46–118)
ALT SERPL-CCNC: 20 U/L (ref 10–49)
ANION GAP SERPL CALC-SCNC: 11 MMOL/L (ref 0–18)
AST SERPL-CCNC: 20 U/L (ref ?–34)
BILIRUB SERPL-MCNC: 0.6 MG/DL (ref 0.3–1.2)
BUN BLD-MCNC: 19 MG/DL (ref 9–23)
CALCIUM BLD-MCNC: 9.7 MG/DL (ref 8.7–10.6)
CHLORIDE SERPL-SCNC: 100 MMOL/L (ref 98–112)
CHOLEST SERPL-MCNC: 139 MG/DL (ref ?–200)
CO2 SERPL-SCNC: 25 MMOL/L (ref 21–32)
CREAT BLD-MCNC: 0.86 MG/DL (ref 0.55–1.02)
EGFRCR SERPLBLD CKD-EPI 2021: 79 ML/MIN/1.73M2 (ref 60–?)
FASTING PATIENT LIPID ANSWER: YES
FASTING STATUS PATIENT QL REPORTED: YES
GLOBULIN PLAS-MCNC: 3 G/DL (ref 2–3.5)
GLUCOSE BLD-MCNC: 97 MG/DL (ref 70–99)
HDLC SERPL-MCNC: 46 MG/DL (ref 40–59)
LDLC SERPL CALC-MCNC: 74 MG/DL (ref ?–100)
NONHDLC SERPL-MCNC: 93 MG/DL (ref ?–130)
OSMOLALITY SERPL CALC.SUM OF ELEC: 284 MOSM/KG (ref 275–295)
POTASSIUM SERPL-SCNC: 4.2 MMOL/L (ref 3.5–5.1)
PROT SERPL-MCNC: 7.8 G/DL (ref 5.7–8.2)
SODIUM SERPL-SCNC: 136 MMOL/L (ref 136–145)
TRIGL SERPL-MCNC: 106 MG/DL (ref 30–149)
VLDLC SERPL CALC-MCNC: 16 MG/DL (ref 0–30)

## 2025-07-24 PROCEDURE — 80053 COMPREHEN METABOLIC PANEL: CPT

## 2025-07-24 PROCEDURE — 36415 COLL VENOUS BLD VENIPUNCTURE: CPT

## 2025-07-24 PROCEDURE — 80061 LIPID PANEL: CPT

## 2025-07-27 NOTE — PROGRESS NOTES
Chief Complaint   Patient presents with    Diabetes     Follow up streaming Dexcom/OP5        Taryn is a 59 year old  presenting for  type 2 DM medication management.   Iain Baker MD,. Primary care physician   Last Diabetes appointment 1-2025         Today's A1C 6.1 ( last A1C 6.2% )    Low insulin usage however, patient  prefers to stay on pump since it is more convenient for her with bolusing - having insulin on her at all times (v traveling w insulin pens)     Has more stress - w her job. Feels it should be improving soon - w new manager.         Recent labs - reviewed today       Reviewed OP 5/CGM report/trends w patient today:   (full download in media)   Sensor active time 97  %    30 day GMI --  Average glucose:147 mg/dl   Hypoglycemia 0%  Time in Range 87  %  (ADA recommended goal > 70%)   Variability WNL ( < 33%)     100% automated mode  0% automated limited  0% manual mode       Pump download;   14 d average:   TOTAL DAILY DOSE OF INSULIN 8.8   u/day (last upload 5.7   u/day)  Bolus: 3.5  u/day (last upload 3.3 u/day )  Basal: 5.3  u/day (last upload 2.4 u/day)             Diabetes History:  Type 2 DM ~ 2009    Patient has not had hospitalizations for blood sugar issues and denies any history of pancreatitis    Mounjaro started 2023   Pre Mounjaro weight; 237 lb   Pre GLP  A1C 8.3  ( 2018 )      Previous DM therapies:  Metformin- hives/rash  Glimiperide/glipzide: change in therapy    Trulicity 2018--> change in therapy , lack of effect   Chinyere CGM: skin rash w adhesive      Ozempic  2mg : lack of effect; change in therapy       Current DM Regimen:    Dexcom G7 CGM   Mounjaro 15 mg subcutaneous once weekly   Jardiance 25mg once daily      Omni pod 5  w NOVOLOG    12MN   0.2 /hour       Bolus settings:   IC ratio :  10       ISF:   45   BG target 120 mg/dl (correct above 120 mg/dl)   3 hour active insulin      Max basal: 1.5 u/hour  Max bolus 20 units       Pump back up plan: Tresiba  8  units  once daily  if off pump > 24 hours               HGBA1C:    Lab Results   Component Value Date    A1C 6.2 (H) 01/25/2025    A1C 6.3 (A) 07/31/2024    A1C 6.3 (A) 03/13/2024     (H) 01/25/2025        Lab Results   Component Value Date    CHOLEST 139 07/24/2025    CHOLEST 170 01/25/2025    TRIG 106 07/24/2025    TRIG 108 01/25/2025    HDL 46 07/24/2025    HDL 43 01/25/2025    LDL 74 07/24/2025     (H) 01/25/2025     Lab Results   Component Value Date    MICROALBCREA 8.1 01/25/2025    MICROALBCREA 8.8 03/13/2024      Lab Results   Component Value Date    CREATSERUM 0.86 07/24/2025    CREATSERUM 0.83 01/25/2025    EGFRCR 79 07/24/2025    EGFRCR 82 01/25/2025     Lab Results   Component Value Date    AST 20 07/24/2025    AST 17 01/25/2025    ALT 20 07/24/2025    ALT 22 01/25/2025       Lab Results   Component Value Date    TSH 1.383 01/25/2025    TSH 1.218 09/12/2024    T4F 1.4 07/10/2024           DM Complications:  Microvascular:   Neuropathy: yes, LE   Retinopathy: no  Nephropathy: no    Macrovascular:  PVD: no  CAD: no  Stroke/CVA: no    Modifying factors:  Medication adherence: yes   Recent steroids, illness or infections(past 3m)  No    Allergies: Glucophage [metformin hydrochloride]    Past Medical History:    Anxiety state, unspecified    Arrhythmia    SVT    Back problem    SORE TAILBONE    Calculus of kidney    Disorder of thyroid    Diverticulosis of large intestine    Essential hypertension, benign    High cholesterol    History of 2019 novel coronavirus disease (COVID-19)    not hospitalized, flu-like symptoms; lingering symptom: fogginess    Hypothyroidism    Dx at age 42    Type II or unspecified type diabetes mellitus without mention of complication, not stated as uncontrolled    Visual impairment    glasses     Past Surgical History:   Procedure Laterality Date    Colonoscopy N/A 7/23/2021    Procedure: COLONOSCOPY with forcep polypectomy, clip placement x1, hot snare polypectomy, and  tattoo injection;  Surgeon: Acosta Barrera DO;  Location:  ENDOSCOPY    Colonoscopy N/A 3/20/2025    Procedure: COLONOSCOPY WITH COLD SNARE POLYPECTOMY AND COLD FORCEP POLYPECTOMY;  Surgeon: Aden Mohan MD;  Location:  ENDOSCOPY    Other surgical history      [place] teeth extraction     Social History     Socioeconomic History    Marital status:     Number of children: 0   Occupational History    Occupation: Inventory Control     Comment: Warehouse    Occupation: [employer] worker   Tobacco Use    Smoking status: Never    Smokeless tobacco: Never   Vaping Use    Vaping status: Never Used   Substance and Sexual Activity    Alcohol use: Not Currently     Comment: rare    Drug use: Yes     Types: Cannabis     Comment: Very very rare uses marijuana gummies    Sexual activity: Yes   Other Topics Concern    Caffeine Concern Yes     Comment: 1-2 cans of soda weekly.     Stress Concern Yes    Weight Concern Yes    Special Diet No    Exercise Yes     Comment: Walks 1-2x/week    Seat Belt Yes     Social Drivers of Health      Received from [place]    [facility]     Family History   Problem Relation Age of Onset    Diabetes Mother         Type 2 DM    Heart Disorder Mother         CHF and atrial fib    Diabetes Father     Diabetes Maternal Grandmother     Diabetes Brother         One brother with Type 2 DM    Thyroid Disorder Neg     Thyroid disease Neg      Current Outpatient Medications   Medication Sig Dispense Refill    glucagon (GVOKE HYPOPEN 2-PACK) 1 MG/0.2ML Subcutaneous injection 1mg subcutaneous as needed for severe hypoglycemia 0.4 mL 1    insulin aspart (NOVOLOG) 100 Units/mL Injection Solution Inject up to 40 units under the skin daily via insuli pump. 40 mL 0    levothyroxine 137 MCG Oral Tab Take 137 mcg by mouth before breakfast. 90 tablet 1    rosuvastatin 20 MG Oral Tab Take 1 tablet (20 mg total) by mouth nightly. 90 tablet 3    lisinopril 10 MG Oral Tab Take 1 tablet (10 mg total) by  mouth daily. 90 tablet 3    Empagliflozin (JARDIANCE) 25 MG Oral Tab TAKE 1 TABLET BY MOUTH EVERY DAY 90 tablet 2    fexofenadine 180 MG Oral Tab Take 1 tablet (180 mg total) by mouth daily. 30 tablet 0    Metoprolol Succinate ER 25 MG Oral Tablet 24 Hr Take 1 tablet (25 mg total) by mouth daily.      ORPHENADRINE  MG Oral Tablet 12 Hr TAKE 100 MG BY MOUTH 2 (TWO) TIMES DAILY AS NEEDED. PHYSICAL DUE! 60 tablet 0    ALPRAZolam 0.5 MG Oral Tab Take 1 tablet (0.5 mg total) by mouth nightly as needed. PHYSICAL DUE 30 tablet 0    traMADol 50 MG Oral Tab Take 1 tablet (50 mg total) by mouth every 6 (six) hours as needed for Pain. PHYSICAL DUE 30 tablet 0    zolpidem 10 MG Oral Tab Take 1 tablet (10 mg total) by mouth nightly as needed for Sleep. PHYSICAL DUE 90 tablet 0    Insulin Disposable Pump (OMNIPOD 5 G7 PODS, GEN 5,) Does not apply Misc Change 1 pod every 3 days. 10 each 4    guaiFENesin-codeine 100-10 MG/5ML Oral Solution Take 10 mL by mouth 3 (three) times daily as needed. 180 mL 0    Continuous Glucose Sensor (DEXCOM G7 SENSOR) Does not apply Misc Use as directed every 10 days. 9 each 0    PEG 3350-KCl-Na Bicarb-NaCl (TRILYTE) 420 g Oral Recon Soln Starting at 4:00 pm the night before procedure, drink 8 ounces of the prep every 15-20 minutes until finished 1 each 0    Tirzepatide (MOUNJARO) 15 MG/0.5ML Subcutaneous Solution Auto-injector Inject 15 mg into the skin once a week. (Patient taking differently: Inject 15 mg into the skin once a week. SUNDAY) 6 mL 1    sertraline 100 MG Oral Tab Take 1 tablet (100 mg total) by mouth daily. 90 tablet 0    albuterol (PROAIR HFA) 108 (90 Base) MCG/ACT Inhalation Aero Soln Inhale 2 puffs into the lungs every 4 (four) hours as needed for Wheezing. 1 each 1    insulin degludec (TRESIBA FLEXTOUCH) 100 UNIT/ML Subcutaneous Solution Pen-injector Up to 12 units once daily when off pump only 3 mL 0     DM associated review of  symptoms:   Endocrine: Polyuria, polyphagia,  polydipsia: no  Neurological: Paresthesias: no  HEENT: Blurred vision: no  Skin: no wounds or skin rash   Hematological: Hypoglycemia: no      Review of Systems   LUNGS: denies shortness of breath   CARDIOVASCULAR: denies chest pain  GI: denies abdominal pain, nausea  or diarrhea   : denies dysuria or mycotic infection sxs       Physical exam:  /68   Pulse 94   Resp 16   Wt 210 lb 6.4 oz (95.4 kg)   LMP  (LMP Unknown)   SpO2 99%   BMI 29.34 kg/m²   Body mass index is 29.34 kg/m².      Physical Exam   Vitals reviewed.  Constitutional: Normal appearance   Cardiovascular: Normal rate   Pulmonary/Chest: Effort normal  Neurological: Alert and oriented .   Psychiatric: Normal mood and affect.      Assessment/Plan:    Hypertension   Well controlled    Continue Lisinopril 10mg once daily    Reminded pt BP target < 140-90       Dyslipidemia   Cholesterol: 139, done on 7/24/2025.  HDL Cholesterol: 46, done on 7/24/2025.  TriGlycerides 106, done on 7/24/2025.  LDL Cholesterol: 74, done on 7/24/2025.    LDL improved w change to Rosuvastatin   Needs ongoing monitoring     Continue Rosuvastatin 20 mg once daily   Recommended cardiac CT score test- ordered today       Obesity --> BMI now 29   Weight increased but recent stressors    Continue lifestyle modifications and Mounjaro rx          Type 2 diabetes, uncontrolled, with neuropathy (HCC)  A1C: 6.1%    (last A1C 6.2%)   Weight: 210   lb ( last weight 203 lb, 224, 237  lb  )   Diabetes control is stable;   needs ongoing management and evaluation  given the chronicity of Diabetes.  Reminded about treatment goals and benefits of improved glycemic control for her long term health   Prefers to stay on insulin despite low insulin use --> feels better with consistent insulin delivery v MDI   Will decrease pump settings       Omni pod 5  w humalog U 100 w Dexcom G6   12MN   0.2 /hour       Bolus settings:   IC ratio :   8   ~Reminded to enter carbs at meals : using set  meal entries: 30- gm per meal     ISF:  45   Blood sugar 120 mg/dl (correct above 120 mg/dl)   3 hour active insulin    Reverse correction ON     Continue:  Mounjaro 15 mg subcutaneous once weekly    Jardiance 25mg once daily  Basal for pump back up:   Tresiba units 100 flex touch: 8 units once daily   Continue site rotation  site selection/rotation for pump sites to avoid lipodystrophy     Reminded pt on A1C and blood sugar targets (Fasting < 130 and post prandial <180 ) and complications associated with hyperglycemia and uncontrolled DM (on AUTUMN)  Recommended SMBG 4- x daily if not using CGM   Reviewed s/s and treatment of hypoglycemia (on AVS)   Glucagon prescription - gvoke refilled today     Continue lifestyle modifications since they have positive impact on diabetes/blood sugars/health (portion control, physical activity, weight loss)     The patient is asked to return in 5-6  m ,  but recommended to contact DM clinic sooner if questions or concerns.        Orders Placed This Encounter    POC Hemoglobin A1C     Release to patient:   Immediate    glucagon (GVOKE HYPOPEN 2-PACK) 1 MG/0.2ML Subcutaneous injection     Simg subcutaneous as needed for severe hypoglycemia     Dispense:  0.4 mL     Refill:  1     Diabetes complications & risks surveillance:   A1C/Blood pressure: as reported    Last dilated eye exam: Last Dilated Eye Exam: 24   Exam shows retinopathy? Eye Exam shows Diabetic Retinopathy?: No  Last diabetic foot exam: Last Foot Exam: 25    Nephropathy screening:    Continue ace /arb rx.      LIPID screening:    Lab Results   Component Value Date    CHOLEST 139 2025    LDL 74 2025    TRIG 106 2025    HDL 46 2025    FASTING Yes 2025    FASTING Yes 2025     Cholesterol Lowering Medications            rosuvastatin 20 MG Oral Tab             The risks and benefits of my recommendations, as well as other treatment options were discussed with the patient  today. questions were also answered to the best of my knowledge.

## 2025-07-28 ENCOUNTER — OFFICE VISIT (OUTPATIENT)
Facility: CLINIC | Age: 60
End: 2025-07-28
Payer: COMMERCIAL

## 2025-07-28 VITALS
SYSTOLIC BLOOD PRESSURE: 116 MMHG | RESPIRATION RATE: 16 BRPM | WEIGHT: 210.38 LBS | BODY MASS INDEX: 29 KG/M2 | HEART RATE: 94 BPM | DIASTOLIC BLOOD PRESSURE: 68 MMHG | OXYGEN SATURATION: 99 %

## 2025-07-28 DIAGNOSIS — M54.50 CHRONIC MIDLINE LOW BACK PAIN WITHOUT SCIATICA: ICD-10-CM

## 2025-07-28 DIAGNOSIS — E03.9 HYPOTHYROIDISM (ACQUIRED): ICD-10-CM

## 2025-07-28 DIAGNOSIS — Z79.4 TYPE 2 DIABETES MELLITUS WITH HYPERGLYCEMIA, WITH LONG-TERM CURRENT USE OF INSULIN (HCC): Primary | ICD-10-CM

## 2025-07-28 DIAGNOSIS — I15.2 HYPERTENSION ASSOCIATED WITH DIABETES (HCC): ICD-10-CM

## 2025-07-28 DIAGNOSIS — F41.9 ANXIETY: ICD-10-CM

## 2025-07-28 DIAGNOSIS — E11.69 DYSLIPIDEMIA ASSOCIATED WITH TYPE 2 DIABETES MELLITUS (HCC): ICD-10-CM

## 2025-07-28 DIAGNOSIS — G89.29 CHRONIC MIDLINE LOW BACK PAIN WITHOUT SCIATICA: ICD-10-CM

## 2025-07-28 DIAGNOSIS — E11.65 TYPE 2 DIABETES MELLITUS WITH HYPERGLYCEMIA, WITH LONG-TERM CURRENT USE OF INSULIN (HCC): Primary | ICD-10-CM

## 2025-07-28 DIAGNOSIS — E78.5 DYSLIPIDEMIA ASSOCIATED WITH TYPE 2 DIABETES MELLITUS (HCC): ICD-10-CM

## 2025-07-28 DIAGNOSIS — Z96.41 INSULIN PUMP IN PLACE: ICD-10-CM

## 2025-07-28 DIAGNOSIS — E11.59 HYPERTENSION ASSOCIATED WITH DIABETES (HCC): ICD-10-CM

## 2025-07-28 LAB — HEMOGLOBIN A1C: 6.1 % (ref 4.3–5.6)

## 2025-07-28 PROCEDURE — 95251 CONT GLUC MNTR ANALYSIS I&R: CPT | Performed by: NURSE PRACTITIONER

## 2025-07-28 PROCEDURE — 99214 OFFICE O/P EST MOD 30 MIN: CPT | Performed by: NURSE PRACTITIONER

## 2025-07-28 PROCEDURE — 83036 HEMOGLOBIN GLYCOSYLATED A1C: CPT | Performed by: NURSE PRACTITIONER

## 2025-07-28 RX ORDER — GLUCAGON INJECTION, SOLUTION 1 MG/.2ML
INJECTION, SOLUTION SUBCUTANEOUS
Qty: 0.4 ML | Refills: 1 | Status: SHIPPED | OUTPATIENT
Start: 2025-07-28

## 2025-07-28 RX ORDER — LEVOTHYROXINE SODIUM 137 UG/1
137 TABLET ORAL
Qty: 90 TABLET | Refills: 0 | Status: SHIPPED | OUTPATIENT
Start: 2025-07-28

## 2025-07-28 NOTE — TELEPHONE ENCOUNTER
Levothyroxine 137 mcg  Filled 1-30-25  Qty 90  1 refill  Future Appointments   Date Time Provider Department Center   1/7/2026  7:30 AM Kathleen Buitrago APRN [facility] [place]   LOV 2-8-25   Labs 1-25-25 TSH W REFLEX

## 2025-07-28 NOTE — PATIENT INSTRUCTIONS
A heart scan, a CT scan of the heart, is the safest and most accurate screening tool for detecting the early build-up of calcium in the coronary arteries, the most common cause of heart disease. This simple, painless and potentially lifesaving test takes just 15 minutes.      Cardiac ct scan: to check on plaque build up in heart: Call [phone]        Continue     Dexcom G7 CGM w Omni pod   Mounjaro 15 mg subcutaneous once weekly   Jardiance 25mg once daily     Patient Resources: Omnipod® 5 Funmi for [employer]  Omnipod          Once here, the patient can click:  Transitioning to Dexcom G7 Integration.    *Important to note that switching between sensor models, from G6 to G7,  must be done at a pod change with no active pod.  This information is also included at the link.  The patient will go to manage sensor and select add new when at this point.             American Diabetes Association: blood sugar targets:     Fasting blood sugar (before breakfast) Target:    (ideally less than 110)  2 hours after eating less than 180 (ideally less than  150 )     Call for blood sugars less than  75 or greater than  200 more than 2 times in a week     If you are wearing the sensor, please look at your trends/averages    Recommendations:   GMI (estimated A1C ) target under  7%     Time in range (healthy blood sugar targets) : goal is over 70%   less than 70 : goal is less than 4%   Over 180 : goal is less than 20 %   Over 250: goal is  less than 5%     Watch for low blood sugars: (less than 70 )    Treatment of Low Blood Glucose Action Plan  1. Check blood glucose to be sure that it is low. You cannot  always go by symptoms or how you feel. If in doubt, treat your low blood glucose anyway.  Rule of 15 :     2. Take 15 grams of carbohydrate (carb). Here are some choices:    4 oz. regular fruit juice  3-4 glucose tablets  6 oz. regular soda   7-8 jelly beans    3. Recheck blood glucose after 10-15 minutes. If blood  glucose is still low (less than 70 mg/dl) repeat the treatment (step 2).    4. If your next meal is more than one hour away, eat a small snack.    5. If you’re not sure what caused your low blood glucose, call your healthcare provider.    6. Always check your blood glucose before you drive       To treat a low, I recommend you carry with you easy, pre-portioned treatment for low blood sugars that are 15G of carbs:   - Children sized squeeze pouch applesauce (high fiber + carbs help prevent too high of a spike)  - Small children's sized juicebox- 15g carb --> 4oz juice box  - Glucose tablets from [place] Everywhere/[employer], you can find them near diabetes supplies --> Note, you will need to eat 3-4 tablets to get to 15g of carbs  - Children sized fruit snack pack- look for one with 15 grams of total carbohydrate    AVOID complex carbs, or foods that contain fats along with carbs (like chocolate) can slow the absorption of glucose and should NOT be used to treat an emergency low

## 2025-07-29 RX ORDER — TRAMADOL HYDROCHLORIDE 50 MG/1
TABLET ORAL
Qty: 30 TABLET | Refills: 0 | OUTPATIENT
Start: 2025-07-29

## 2025-07-30 RX ORDER — ALPRAZOLAM 0.5 MG
TABLET ORAL
Qty: 30 TABLET | Refills: 0 | Status: SHIPPED | OUTPATIENT
Start: 2025-07-30

## 2025-07-30 RX ORDER — TRAMADOL HYDROCHLORIDE 50 MG/1
50 TABLET ORAL EVERY 6 HOURS PRN
Qty: 30 TABLET | Refills: 0 | Status: SHIPPED | OUTPATIENT
Start: 2025-07-30

## 2025-08-15 RX ORDER — ACYCLOVIR 400 MG/1
TABLET ORAL
Qty: 9 EACH | Refills: 0 | Status: SHIPPED | OUTPATIENT
Start: 2025-08-15

## 2025-08-16 ENCOUNTER — OFFICE VISIT (OUTPATIENT)
Dept: INTERNAL MEDICINE CLINIC | Facility: CLINIC | Age: 60
End: 2025-08-16

## 2025-08-16 ENCOUNTER — LAB ENCOUNTER (OUTPATIENT)
Dept: LAB | Age: 60
End: 2025-08-16
Attending: FAMILY MEDICINE

## 2025-08-16 VITALS
OXYGEN SATURATION: 99 % | RESPIRATION RATE: 14 BRPM | SYSTOLIC BLOOD PRESSURE: 116 MMHG | BODY MASS INDEX: 29.09 KG/M2 | DIASTOLIC BLOOD PRESSURE: 68 MMHG | TEMPERATURE: 97 F | HEART RATE: 66 BPM | HEIGHT: 71 IN | WEIGHT: 207.81 LBS

## 2025-08-16 DIAGNOSIS — Z00.00 ROUTINE GENERAL MEDICAL EXAMINATION AT A HEALTH CARE FACILITY: Primary | ICD-10-CM

## 2025-08-16 DIAGNOSIS — F41.9 ANXIETY: ICD-10-CM

## 2025-08-16 DIAGNOSIS — Z79.4 TYPE 2 DIABETES MELLITUS WITH HYPERGLYCEMIA, WITH LONG-TERM CURRENT USE OF INSULIN (HCC): ICD-10-CM

## 2025-08-16 DIAGNOSIS — G47.00 INSOMNIA, UNSPECIFIED TYPE: ICD-10-CM

## 2025-08-16 DIAGNOSIS — E11.65 TYPE 2 DIABETES MELLITUS WITH HYPERGLYCEMIA, WITH LONG-TERM CURRENT USE OF INSULIN (HCC): ICD-10-CM

## 2025-08-16 DIAGNOSIS — Z01.419 ENCOUNTER FOR GYNECOLOGICAL EXAMINATION WITH PAPANICOLAOU SMEAR OF CERVIX: ICD-10-CM

## 2025-08-16 DIAGNOSIS — I10 HYPERTENSION, ESSENTIAL, BENIGN: ICD-10-CM

## 2025-08-16 DIAGNOSIS — R74.8 ELEVATED ALKALINE PHOSPHATASE LEVEL: ICD-10-CM

## 2025-08-16 DIAGNOSIS — E78.5 HYPERLIPIDEMIA LDL GOAL <100: ICD-10-CM

## 2025-08-16 DIAGNOSIS — E03.9 HYPOTHYROIDISM (ACQUIRED): ICD-10-CM

## 2025-08-16 DIAGNOSIS — Z00.00 ROUTINE GENERAL MEDICAL EXAMINATION AT A HEALTH CARE FACILITY: ICD-10-CM

## 2025-08-16 LAB
ALP LIVER SERPL-CCNC: 131 U/L (ref 46–118)
BASOPHILS # BLD AUTO: 0.05 X10(3) UL (ref 0–0.2)
BASOPHILS NFR BLD AUTO: 0.5 %
EOSINOPHIL # BLD AUTO: 0.31 X10(3) UL (ref 0–0.7)
EOSINOPHIL NFR BLD AUTO: 3.3 %
ERYTHROCYTE [DISTWIDTH] IN BLOOD BY AUTOMATED COUNT: 12.1 %
EST. AVERAGE GLUCOSE BLD GHB EST-MCNC: 134 MG/DL (ref 68–126)
HBA1C MFR BLD: 6.3 % (ref ?–5.7)
HCT VFR BLD AUTO: 44.2 % (ref 35–48)
HGB BLD-MCNC: 14.8 G/DL (ref 12–16)
IMM GRANULOCYTES # BLD AUTO: 0.02 X10(3) UL (ref 0–1)
IMM GRANULOCYTES NFR BLD: 0.2 %
LYMPHOCYTES # BLD AUTO: 2.66 X10(3) UL (ref 1–4)
LYMPHOCYTES NFR BLD AUTO: 28.6 %
MCH RBC QN AUTO: 29.8 PG (ref 26–34)
MCHC RBC AUTO-ENTMCNC: 33.5 G/DL (ref 31–37)
MCV RBC AUTO: 89.1 FL (ref 80–100)
MONOCYTES # BLD AUTO: 0.48 X10(3) UL (ref 0.1–1)
MONOCYTES NFR BLD AUTO: 5.2 %
NEUTROPHILS # BLD AUTO: 5.78 X10 (3) UL (ref 1.5–7.7)
NEUTROPHILS # BLD AUTO: 5.78 X10(3) UL (ref 1.5–7.7)
NEUTROPHILS NFR BLD AUTO: 62.2 %
PLATELET # BLD AUTO: 261 10(3)UL (ref 150–450)
RBC # BLD AUTO: 4.96 X10(6)UL (ref 3.8–5.3)
TSI SER-ACNC: 1.97 UIU/ML (ref 0.55–4.78)
VIT D+METAB SERPL-MCNC: 61.2 NG/ML (ref 30–100)
WBC # BLD AUTO: 9.3 X10(3) UL (ref 4–11)

## 2025-08-16 PROCEDURE — 82306 VITAMIN D 25 HYDROXY: CPT

## 2025-08-16 PROCEDURE — 87624 HPV HI-RISK TYP POOLED RSLT: CPT | Performed by: FAMILY MEDICINE

## 2025-08-16 PROCEDURE — 88175 CYTOPATH C/V AUTO FLUID REDO: CPT | Performed by: FAMILY MEDICINE

## 2025-08-16 PROCEDURE — 84443 ASSAY THYROID STIM HORMONE: CPT

## 2025-08-16 PROCEDURE — 84075 ASSAY ALKALINE PHOSPHATASE: CPT

## 2025-08-16 PROCEDURE — 36415 COLL VENOUS BLD VENIPUNCTURE: CPT

## 2025-08-16 PROCEDURE — 83036 HEMOGLOBIN GLYCOSYLATED A1C: CPT

## 2025-08-16 PROCEDURE — 85025 COMPLETE CBC W/AUTO DIFF WBC: CPT

## 2025-08-16 RX ORDER — SERTRALINE HYDROCHLORIDE 100 MG/1
100 TABLET, FILM COATED ORAL DAILY
Qty: 90 TABLET | Refills: 0 | OUTPATIENT
Start: 2025-08-16

## 2025-08-16 RX ORDER — SERTRALINE HYDROCHLORIDE 100 MG/1
100 TABLET, FILM COATED ORAL DAILY
Qty: 90 TABLET | Refills: 1 | Status: SHIPPED | OUTPATIENT
Start: 2025-08-16

## 2025-08-19 ENCOUNTER — PATIENT MESSAGE (OUTPATIENT)
Dept: ENDOCRINOLOGY CLINIC | Facility: CLINIC | Age: 60
End: 2025-08-19

## 2025-08-19 RX ORDER — TIRZEPATIDE 15 MG/.5ML
15 INJECTION, SOLUTION SUBCUTANEOUS WEEKLY
Qty: 6 ML | Refills: 0 | Status: SHIPPED | OUTPATIENT
Start: 2025-08-19

## 2025-08-20 LAB — HPV E6+E7 MRNA CVX QL NAA+PROBE: NEGATIVE

## 2025-08-26 ENCOUNTER — ORDER TRANSCRIPTION (OUTPATIENT)
Dept: ADMINISTRATIVE | Facility: HOSPITAL | Age: 60
End: 2025-08-26

## 2025-08-26 DIAGNOSIS — Z13.6 SCREENING FOR CARDIOVASCULAR CONDITION: Primary | ICD-10-CM

## 2025-08-27 RX ORDER — ORPHENADRINE CITRATE 100 MG/1
100 TABLET ORAL 2 TIMES DAILY PRN
Qty: 60 TABLET | Refills: 0 | Status: SHIPPED | OUTPATIENT
Start: 2025-08-27

## 2025-08-29 ENCOUNTER — HOSPITAL ENCOUNTER (OUTPATIENT)
Dept: CT IMAGING | Facility: HOSPITAL | Age: 60
Discharge: HOME | End: 2025-08-29
Attending: NURSE PRACTITIONER

## 2025-08-29 VITALS — WEIGHT: 194 LBS | HEIGHT: 71 IN | BODY MASS INDEX: 27.16 KG/M2

## 2025-08-29 DIAGNOSIS — E11.65 TYPE 2 DIABETES MELLITUS WITH HYPERGLYCEMIA, WITH LONG-TERM CURRENT USE OF INSULIN (HCC): ICD-10-CM

## 2025-08-29 DIAGNOSIS — Z79.4 TYPE 2 DIABETES MELLITUS WITH HYPERGLYCEMIA, WITH LONG-TERM CURRENT USE OF INSULIN (HCC): ICD-10-CM

## 2025-08-29 DIAGNOSIS — Z13.6 SCREENING FOR CARDIOVASCULAR CONDITION: ICD-10-CM

## 2025-08-29 LAB
GLUCOSE POC: 112 MG/DL (ref 70–100)
HDL POC: 36 MG/DL (ref 40–60)
LDL POC: 56 MG/DL (ref 0–130)
TC/HDL RATIO: 3 (ref 0–5)
TOTAL CHOLESTEROL POC: 119 MG/DL (ref 0–200)
TRIGLYCERIDES POC: 132 MG/DL (ref 0–200)

## 2025-08-30 DIAGNOSIS — F41.9 ANXIETY: ICD-10-CM

## 2025-08-30 DIAGNOSIS — G47.00 INSOMNIA, UNSPECIFIED TYPE: ICD-10-CM

## 2025-08-31 RX ORDER — ALPRAZOLAM 0.5 MG
TABLET ORAL
Qty: 30 TABLET | Refills: 0 | Status: SHIPPED | OUTPATIENT
Start: 2025-08-31

## 2025-08-31 RX ORDER — ZOLPIDEM TARTRATE 10 MG/1
TABLET ORAL
Qty: 90 TABLET | Refills: 0 | Status: SHIPPED | OUTPATIENT
Start: 2025-08-31

## (undated) DIAGNOSIS — F41.9 ANXIETY: ICD-10-CM

## (undated) DIAGNOSIS — IMO0002 TYPE 2 DIABETES, UNCONTROLLED, WITH NEUROPATHY: ICD-10-CM

## (undated) DIAGNOSIS — E11.65 TYPE 2 DIABETES MELLITUS WITH HYPERGLYCEMIA, WITH LONG-TERM CURRENT USE OF INSULIN (HCC): ICD-10-CM

## (undated) DIAGNOSIS — R80.9 MICROALBUMINURIA: Primary | ICD-10-CM

## (undated) DIAGNOSIS — E78.5 HYPERLIPIDEMIA LDL GOAL <100: Primary | ICD-10-CM

## (undated) DIAGNOSIS — M54.50 CHRONIC BILATERAL LOW BACK PAIN WITHOUT SCIATICA: Primary | ICD-10-CM

## (undated) DIAGNOSIS — E78.5 HYPERLIPIDEMIA LDL GOAL <100: ICD-10-CM

## (undated) DIAGNOSIS — K63.5 POLYP OF COLON: ICD-10-CM

## (undated) DIAGNOSIS — Z79.4 TYPE 2 DIABETES MELLITUS WITH HYPERGLYCEMIA, WITH LONG-TERM CURRENT USE OF INSULIN (HCC): ICD-10-CM

## (undated) DIAGNOSIS — G89.29 CHRONIC BILATERAL LOW BACK PAIN WITHOUT SCIATICA: Primary | ICD-10-CM

## (undated) DEVICE — Device: Brand: DEFENDO AIR/WATER/SUCTION AND BIOPSY VALVE

## (undated) DEVICE — 3M™ RED DOT™ MONITORING ELECTRODE WITH FOAM TAPE AND STICKY GEL 2570-3, 3/BAG, 200/CASE, 54/PLT: Brand: RED DOT™

## (undated) DEVICE — 1200CC GUARDIAN II: Brand: GUARDIAN

## (undated) DEVICE — NEEDLE CONTRAST INTERJECT 25G

## (undated) DEVICE — TRAP 4 CPTR CHMBR N EZ INLN

## (undated) DEVICE — FILTERLINE NASAL ADULT O2/CO2

## (undated) DEVICE — FORCEP BIOPSY RJ4 LG CAP W/ND

## (undated) DEVICE — GIJAW SINGLE-USE BIOPSY FORCEPS WITH NEEDLE: Brand: GIJAW

## (undated) DEVICE — MEDI-VAC NON-CONDUCTIVE SUCTION TUBING: Brand: CARDINAL HEALTH

## (undated) DEVICE — ENDOSCOPY PACK - LOWER: Brand: MEDLINE INDUSTRIES, INC.

## (undated) DEVICE — CLIP LGT 11MM OPEN 2.8MM 235CM

## (undated) DEVICE — KIT CUSTOM ENDOPROCEDURE STERIS

## (undated) DEVICE — LASSO POLYPECTOMY SNARE: Brand: LASSO

## (undated) DEVICE — SNARE CAPTIFLEX MICRO-OVL OLY

## (undated) DEVICE — KIT VLV 5 PC AIR H2O SUCT BX ENDOGATOR CONN

## (undated) DEVICE — 3M™ RED DOT™ MONITORING ELECTRODE WITH FOAM TAPE AND STICKY GEL, 50/BAG, 20/CASE, 72/PLT 2570: Brand: RED DOT™

## (undated) DEVICE — REM POLYHESIVE ADULT PATIENT RETURN ELECTRODE: Brand: VALLEYLAB

## (undated) DEVICE — CO2 CANN,MICROFILTER,ADULT,14',NASAL: Brand: MEDLINE

## (undated) DEVICE — Device: Brand: SPOT EX ENDOSCOPIC TATTOO

## (undated) DEVICE — V2 SPECIMEN COLLECTION MANIFOLD KIT: Brand: NEPTUNE

## (undated) DEVICE — STERILE POLYISOPRENE POWDER-FREE SURGICAL GLOVES: Brand: PROTEXIS

## (undated) NOTE — Clinical Note
Has no insurance, lost job a1c spiked gave samples to help her but she most likely needs insulin or glp1 which she can't afford right now. Maximized oha.

## (undated) NOTE — MR AVS SNAPSHOT
[facility],[address], [address] [place] [address] Rd  [phone]               Thank you for choosing us for your health care visit with Юлия Foote NP.   We are glad to serve you and happy to provide you with this authorization numbers or be assured that none are required. You can then schedule your appointment. Failure to obtain required authorization numbers can create reimbursement difficulties for you.     Assoc Dx:  Type 2 diabetes, uncontrolled, with neuropathy Commonly known as:  SYNTHROID, LEVOTHROID           Lisinopril-Hydrochlorothiazide 20-12.5 MG Tabs   TAKE 1 TABLET BY MOUTH ONCE DAILY. ONETOUCH DELICA LANCETS 70D Misc   1 lancet by Finger stick route 4 (four) times daily.            SITagliptin

## (undated) NOTE — Clinical Note
BG are improving, streaming in epic under encounters. Added trulicity today, Kelley Tracy could you check on this in 4 weeks and call to see how she is doing?  Thanks should be better in next 8 weeks thanks

## (undated) NOTE — Clinical Note
A1C improved to 8.6 from 9.9 changed OHA she is losing her job and benefits having high stress started zoloft also. Will keep you updated.

## (undated) NOTE — LETTER
Date & Time: 4/15/2025, 7:31 PM  Patient: Taryn Watt  Encounter Provider(s):    Sen Dangelo PA-C       To Whom It May Concern:    Taryn Watt was seen and treated in our department on 4/15/2025. She should not return to work until 4/21/2025 .    If you have any questions or concerns, please do not hesitate to call.        _____________________________  Physician/APC Signature

## (undated) NOTE — LETTER
Date: 10/10/2021    Patient Name: Charlie Lehman          To Whom it may concern: The above patient was seen at the [facility] for treatment of a medical condition.     This patient should be excused from attending work starting 10/10/21

## (undated) NOTE — MR AVS SNAPSHOT
[place]  [address] [facility] [address]  [address] [facility] [address] [phone]               Thank you for choosing us for your health care visit with Awa Peng MD.  We are glad to serve you and happy to provide you with this sum Test 2 times daily   Commonly known as:  ONETOUCH VERIO           JANUVIA 100 MG Tabs   Generic drug:  SITagliptin Phosphate   TAKE 1 TABLET BY MOUTH DAILY WITH BREAKFAST           Levothyroxine Sodium 137 MCG Tabs   Take 137 mcg by mouth every morning bef

## (undated) NOTE — LETTER
02/17/22        [facility] [place] [address]      Dear Christiana Grullon,    [address] [facility] records indicate that you have outstanding lab work and or testing that was ordered for you and has not yet been completed:  Orders Placed This Encounter      Comp Metabolic Panel (14)      Lipid Panel      Hemoglobin A1C    To provide you with the best possible care, please complete these orders at your earliest convenience. If you have recently completed these orders please disregard this letter. If you have any questions please call the office at Dept: [phone].      Thank you,       FELY Briones

## (undated) NOTE — LETTER
Aden Mohan M.D.    Surgical Clearance Needed    Date: 2/28/2025                                                                       From: QUIANA Yap: DR. HAFSA TYLER                                                  Fax: [phone]    RE: Surgical Clearance               # of Pages: 1        Patient Name: Taryn Watt    Patient YOB: 1965    Consult For: CARDIAC CLEARANCE    Surgery: COLONOSCOPY AT [facility]    Date of Surgery: 3/20/25        This facsimile transmission is provided for your internal use only. If the reader of this message is not the intended recipient, you are hereby notified that you have received this document in error, and that any review, dissemination, distribution, or copying of this message is strictly prohibited. If you have received this communication in error, please notify us immediately by telephone and return the original message to us by mail.

## (undated) NOTE — MR AVS SNAPSHOT
[place]  [address] [address]  [address] [facility] [address] [phone]               Thank you for choosing us for your health care visit with Leena Glover MD.  We are glad to serve you and happy to provide you with this sum Commonly known as:  ONETOUCH VERIO           JANUVIA 100 MG Tabs   Generic drug:  SITagliptin Phosphate   TAKE 1 TABLET BY MOUTH DAILY WITH BREAKFAST           Levothyroxine Sodium 137 MCG Tabs   Take 137 mcg by mouth every morning before breakfast.   Comm

## (undated) NOTE — LETTER
12/09/20        [place] [address]      Dear Jacquelyn Parker,    [address] [facility] records indicate that you have outstanding lab work and or testing that was ordered for you and has not yet been completed:  Orders Placed This Encounter

## (undated) NOTE — LETTER
ASTHMA ACTION PLAN for Taryn Watt     : 1965     Date: 2025  Provider:  FELY Lima  Phone for doctor or clinic: [facility]  [address] [facility] [address]  [facility] [id number] [phone]    ACT Score: 25      You can use the colors of a traffic light to help learn about your asthma medicines.      1. Green - Go! % of Personal Best Peak Flow Use controller medicine.   Breathing is good  No cough or wheeze  Can work and play Medicine How much to take When to take it    NO DAILY ASTHMA MEDICATIONS      2. Yellow - Caution. 50-79% Personal Best Peak  Flow.  Use reliever medicine to keep an asthma attack from getting bad.   Cough  Wheezing  Tight Chest  Wake up at night Medicine How much to take When to take it    ALBUTEROL              2 PUFFS                       EVERY 4 HOURS as NEEDED       Additional instructions         3. Red - Stop! Danger!  <50% Personal Best Peak  Flow. Take these medications until  Get help from a doctor   Medicine not helping  Breathing is hard and fast  Nose opens wide  Can't walk  Ribs show  Can't talk well Medicine How much to take When to take it    GO TO NEAREST HOSPITAL OR CALL 9-1-1     Additional Instructions If your symptoms do not improve and you cannot contact your doctor, [facility] or call [facility] immediately!     [x] Asthma Action Plan reviewed with patient (and caregiver if necessary) and a copy of the plan was given to the patient/caregiver.   [] Asthma Action Plan reviewed with patient (and caregiver if necessary) on the phone and mailed copy to patient or submitted via [employer].     Signatures:  Provider  FELY Lima   Patient Caretaker

## (undated) NOTE — MR AVS SNAPSHOT
After Visit Summary   9/28/2019    Kong Lewis    MRN: [id number]           Visit Information     Date & Time  9/28/2019  9:30 AM Provider  FELY Santo Department  [address] [place] [address] & [facility] Dept.  Phone  [phone] Albuterol Sulfate HFA (PROAIR HFA) 108 (90 BASE) MCG/ACT Inhalation Aero Soln Inhale 2 puffs into the lungs every 4 (four) hours as needed for Wheezing.       Diagnoses for This Visit    Encounter for gynecological examination with Papanicolaou smear of ce conditions such as allergies, colds, cough, fever, rash, sore throat, headache and pink eye. The cost for a Video Visit is currently $35.         If you receive a survey from [employer], please take a few minutes to complete it and provide feedback.  We s For more information about hours, locations or appointment options available at [facility],  visit: [email or web address] or call [phone]. ([phone])

## (undated) NOTE — LETTER
11/29/19        [address] [place] [address]      Dear Luca orozco,    [address] [facility] records indicate that you have outstanding lab work and or testing that was ordered for you and has not yet been completed:  Orders Placed This Encounter

## (undated) NOTE — LETTER
10/23/18        [place] [address]      Dear [facility],    [address] [facility] records indicate that you have outstanding lab work and or testing that was ordered for you and has not yet been completed:  Orders Placed This Encounter

## (undated) NOTE — LETTER
Date: 9/23/2020    Patient Name: Leo Mak    To Whom it may concern,    Due to the COVID-19 pandemic and in conjunction with the CDC's recommendations to reduce the exposure and risk of infection in patients with underlying medical conditions.  It

## (undated) NOTE — Clinical Note
Thank you for your referral will add meds as needed to get her BG back in control with the added goal of weight control. Will keep you posted, though she may f/u with endo.

## (undated) NOTE — MR AVS SNAPSHOT
[facility],[address], [address]  [phone]               Thank you for choosing us for your health care visit with Regina Espinoza RD,LDN,CDE.   We are glad to serve you and happy to provide you wi Commonly known as:  SYNTHROID, LEVOTHROID           Lisinopril-Hydrochlorothiazide 20-12.5 MG Tabs   TAKE 1 TABLET BY MOUTH ONCE DAILY. ONETOUCH DELICA LANCETS 24Q Misc   1 lancet by Finger stick route 4 (four) times daily.                    Sriramh